# Patient Record
Sex: FEMALE | Race: WHITE | Employment: OTHER | URBAN - METROPOLITAN AREA
[De-identification: names, ages, dates, MRNs, and addresses within clinical notes are randomized per-mention and may not be internally consistent; named-entity substitution may affect disease eponyms.]

---

## 2017-05-10 ENCOUNTER — HOSPITAL ENCOUNTER (OUTPATIENT)
Dept: WOMENS IMAGING | Age: 67
Discharge: HOME | End: 2017-05-10
Attending: FAMILY MEDICINE | Admitting: FAMILY MEDICINE

## 2017-05-10 DIAGNOSIS — Z12.31 SCREENING MAMMOGRAM, ENCOUNTER FOR: ICD-10-CM

## 2017-05-16 ENCOUNTER — TELEPHONE (OUTPATIENT)
Dept: PHYSICAL MEDICINE AND REHAB | Age: 67
End: 2017-05-16

## 2017-05-17 ENCOUNTER — OFFICE VISIT (OUTPATIENT)
Dept: PHYSICAL MEDICINE AND REHAB | Age: 67
End: 2017-05-17

## 2017-05-17 DIAGNOSIS — R20.2 PARESTHESIA AND PAIN OF BOTH UPPER EXTREMITIES: ICD-10-CM

## 2017-05-17 DIAGNOSIS — M79.601 PARESTHESIA AND PAIN OF BOTH UPPER EXTREMITIES: ICD-10-CM

## 2017-05-17 DIAGNOSIS — M79.602 PARESTHESIA AND PAIN OF BOTH UPPER EXTREMITIES: ICD-10-CM

## 2017-05-17 DIAGNOSIS — R29.898 LEG WEAKNESS, BILATERAL: ICD-10-CM

## 2017-05-17 DIAGNOSIS — M54.17 LUMBOSACRAL RADICULOPATHY AT S1: Primary | ICD-10-CM

## 2017-05-17 PROCEDURE — 95886 MUSC TEST DONE W/N TEST COMP: CPT | Performed by: PHYSICAL MEDICINE & REHABILITATION

## 2017-05-17 PROCEDURE — 95910 NRV CNDJ TEST 7-8 STUDIES: CPT | Performed by: PHYSICAL MEDICINE & REHABILITATION

## 2017-12-07 ENCOUNTER — HOSPITAL ENCOUNTER (OUTPATIENT)
Dept: GENERAL RADIOLOGY | Age: 67
Discharge: HOME | End: 2017-12-07
Attending: FAMILY MEDICINE | Admitting: FAMILY MEDICINE

## 2017-12-07 DIAGNOSIS — M79.604 RIGHT LEG PAIN: ICD-10-CM

## 2017-12-07 DIAGNOSIS — M79.605 LEFT LEG PAIN: ICD-10-CM

## 2017-12-20 ENCOUNTER — HOSPITAL ENCOUNTER (OUTPATIENT)
Dept: ULTRASOUND IMAGING | Age: 67
Discharge: HOME | End: 2017-12-20
Attending: FAMILY MEDICINE | Admitting: FAMILY MEDICINE

## 2017-12-20 DIAGNOSIS — M79.604 RIGHT LEG PAIN: ICD-10-CM

## 2017-12-20 DIAGNOSIS — M79.605 LEFT LEG PAIN: ICD-10-CM

## 2017-12-20 DIAGNOSIS — M79.604 PAIN OF RIGHT LEG: ICD-10-CM

## 2018-03-28 ENCOUNTER — HOSPITAL ENCOUNTER (OUTPATIENT)
Dept: ULTRASOUND IMAGING | Age: 68
Discharge: HOME | End: 2018-03-28
Attending: FAMILY MEDICINE | Admitting: FAMILY MEDICINE

## 2018-03-28 DIAGNOSIS — M79.661 RIGHT CALF PAIN: ICD-10-CM

## 2018-03-28 DIAGNOSIS — M79.669 PAIN OF LOWER LEG: ICD-10-CM

## 2018-05-11 ENCOUNTER — HOSPITAL ENCOUNTER (OUTPATIENT)
Dept: WOMENS IMAGING | Age: 68
Discharge: HOME | End: 2018-05-11
Attending: FAMILY MEDICINE | Admitting: FAMILY MEDICINE

## 2018-05-11 DIAGNOSIS — Z12.31 SCREENING MAMMOGRAM, ENCOUNTER FOR: ICD-10-CM

## 2018-08-31 ENCOUNTER — HOSPITAL ENCOUNTER (OUTPATIENT)
Dept: GENERAL RADIOLOGY | Age: 68
Discharge: HOME | End: 2018-08-31
Attending: FAMILY MEDICINE | Admitting: FAMILY MEDICINE

## 2018-08-31 DIAGNOSIS — M25.562 LEFT KNEE PAIN, UNSPECIFIED CHRONICITY: ICD-10-CM

## 2018-12-06 ENCOUNTER — TELEPHONE (OUTPATIENT)
Dept: ORTHOPEDIC SURGERY | Age: 68
End: 2018-12-06

## 2018-12-13 ENCOUNTER — OFFICE VISIT (OUTPATIENT)
Dept: ORTHOPEDIC SURGERY | Age: 68
End: 2018-12-13

## 2018-12-13 VITALS
OXYGEN SATURATION: 98 % | HEART RATE: 99 BPM | RESPIRATION RATE: 14 BRPM | HEIGHT: 65 IN | BODY MASS INDEX: 26.99 KG/M2 | WEIGHT: 162 LBS

## 2018-12-13 DIAGNOSIS — R52 PAIN: ICD-10-CM

## 2018-12-13 DIAGNOSIS — M17.12 PRIMARY OSTEOARTHRITIS OF LEFT KNEE: Primary | ICD-10-CM

## 2018-12-13 PROCEDURE — 20610 DRAIN/INJ JOINT/BURSA W/O US: CPT | Performed by: PHYSICIAN ASSISTANT

## 2018-12-13 PROCEDURE — 99203 OFFICE O/P NEW LOW 30 MIN: CPT | Performed by: PHYSICIAN ASSISTANT

## 2018-12-13 RX ORDER — NAPROXEN 500 MG/1
500 TABLET ORAL 2 TIMES DAILY PRN
Qty: 60 TABLET | Refills: 1 | Status: SHIPPED | OUTPATIENT
Start: 2018-12-13 | End: 2018-12-17 | Stop reason: SDUPTHER

## 2018-12-13 NOTE — PROGRESS NOTES
Review of Systems   Constitutional: Negative for fever. Musculoskeletal: Positive for arthralgias and joint swelling. Skin: Negative for rash and wound. HPI:  Ale Ruiz is a 77y.o. year old female who complains of 7/10 aching, persistent Left knee pain and stiffness. She states that she knows she has arthritis in the left knee and it is bothering her more. She has used Tylenol, ibuprofen with mild relief. She states that weightbearing going up and down stairs makes the pain worse. Rest does improve her pain mildly. She's had no prior surgery on the knee before. She has been experiencing this pain for the last several weeks. Past Medical History:   Diagnosis Date    Hyperlipidemia     Hypertension     Thyroid disease        Past Surgical History:   Procedure Laterality Date    CHOLECYSTECTOMY         History reviewed. No pertinent family history. Social History     Social History    Marital status:      Spouse name: N/A    Number of children: N/A    Years of education: N/A     Social History Main Topics    Smoking status: Never Smoker    Smokeless tobacco: None    Alcohol use Yes      Comment: rarely    Drug use: No    Sexual activity: Not Asked     Other Topics Concern    None     Social History Narrative    None       Current Outpatient Prescriptions   Medication Sig Dispense Refill    naproxen (NAPROSYN) 500 MG tablet Take 1 tablet by mouth 2 times daily as needed for Pain 60 tablet 1    sertraline (ZOLOFT) 100 MG tablet Take 100 mg by mouth daily. Pt uncertain of strength,  States she has not been taking it consistently      levothyroxine (SYNTHROID) 88 MCG tablet Take 88 mcg by mouth Daily.  Valsartan (DIOVAN PO) Take  by mouth daily.  Atorvastatin Calcium (LIPITOR PO) Take  by mouth daily. No current facility-administered medications for this visit.         No Known Allergies    Review of Systems:  See above      Physical Exam:

## 2018-12-17 RX ORDER — NAPROXEN 500 MG/1
500 TABLET ORAL 2 TIMES DAILY PRN
Qty: 60 TABLET | Refills: 1 | Status: SHIPPED | OUTPATIENT
Start: 2018-12-17 | End: 2019-06-17

## 2019-05-13 ENCOUNTER — HOSPITAL ENCOUNTER (OUTPATIENT)
Dept: WOMENS IMAGING | Age: 69
Discharge: HOME | End: 2019-05-13
Payer: MEDICARE

## 2019-05-13 DIAGNOSIS — M81.0 OSTEOPOROSIS, UNSPECIFIED OSTEOPOROSIS TYPE, UNSPECIFIED PATHOLOGICAL FRACTURE PRESENCE: ICD-10-CM

## 2019-05-13 DIAGNOSIS — Z12.31 SCREENING MAMMOGRAM, ENCOUNTER FOR: ICD-10-CM

## 2019-05-13 PROCEDURE — 77080 DXA BONE DENSITY AXIAL: CPT

## 2019-05-13 PROCEDURE — 77063 BREAST TOMOSYNTHESIS BI: CPT

## 2019-06-17 ENCOUNTER — OFFICE VISIT (OUTPATIENT)
Dept: ORTHOPEDIC SURGERY | Age: 69
End: 2019-06-17
Payer: MEDICARE

## 2019-06-17 VITALS
HEART RATE: 87 BPM | OXYGEN SATURATION: 94 % | WEIGHT: 165 LBS | BODY MASS INDEX: 27.46 KG/M2 | RESPIRATION RATE: 14 BRPM

## 2019-06-17 DIAGNOSIS — M17.12 PRIMARY OSTEOARTHRITIS OF LEFT KNEE: Primary | ICD-10-CM

## 2019-06-17 PROCEDURE — 99212 OFFICE O/P EST SF 10 MIN: CPT | Performed by: PHYSICIAN ASSISTANT

## 2019-06-17 PROCEDURE — 20610 DRAIN/INJ JOINT/BURSA W/O US: CPT | Performed by: PHYSICIAN ASSISTANT

## 2019-06-17 RX ORDER — AMLODIPINE BESYLATE 5 MG/1
TABLET ORAL
Refills: 3 | COMMUNITY
Start: 2019-06-04 | End: 2020-01-06

## 2019-06-17 RX ORDER — GABAPENTIN 100 MG/1
CAPSULE ORAL
Refills: 5 | COMMUNITY
Start: 2019-06-06 | End: 2020-09-14

## 2019-06-17 ASSESSMENT — ENCOUNTER SYMPTOMS
EYES NEGATIVE: 1
RESPIRATORY NEGATIVE: 1
GASTROINTESTINAL NEGATIVE: 1

## 2019-06-17 NOTE — PROGRESS NOTES
I reviewed and agree with the portions of the HPI, review of systems, vital documentation and plan performed by my staff and have added/addended where appropriate. Review of Systems   Constitutional: Negative. Negative for fever. HENT: Negative. Eyes: Negative. Respiratory: Negative. Cardiovascular: Negative. Gastrointestinal: Negative. Genitourinary: Negative. Musculoskeletal: Positive for arthralgias and joint swelling. Skin: Negative. Negative for rash and wound. Neurological: Negative. Negative for numbness. Psychiatric/Behavioral: Negative. HPI:  Whitley Garay is a 68y.o. year old female who complains of 2/10 stabbing, persistent left knee pain stiffness, and locking. She states that she knows she has arthritis in the left knee and it is bothering her more. She has used Tylenol, ibuprofen, and received steroid injections with mild relief that does not last very long. She states that weightbearing, increased activity, and going up and down stairs makes the pain worse. Rest and the use of medicated cream does improve her pain mildly. She's had no prior surgery on the knee before. Onset: 2 years. Pain is located along the medial aspect of the knee, has swellings, and patient reports tenderness upon palpation. Past Medical History:   Diagnosis Date    Hyperlipidemia     Hypertension     Thyroid disease        Past Surgical History:   Procedure Laterality Date    CHOLECYSTECTOMY         No family history on file.     Social History     Socioeconomic History    Marital status: Single     Spouse name: Not on file    Number of children: Not on file    Years of education: Not on file    Highest education level: Not on file   Occupational History    Not on file   Social Needs    Financial resource strain: Not on file    Food insecurity:     Worry: Not on file     Inability: Not on file    Transportation needs:     Medical: Not on file     Non-medical: Not on file   Tobacco Use    Smoking status: Never Smoker    Smokeless tobacco: Never Used   Substance and Sexual Activity    Alcohol use: Yes     Comment: rarely    Drug use: No    Sexual activity: Not on file   Lifestyle    Physical activity:     Days per week: Not on file     Minutes per session: Not on file    Stress: Not on file   Relationships    Social connections:     Talks on phone: Not on file     Gets together: Not on file     Attends Hindu service: Not on file     Active member of club or organization: Not on file     Attends meetings of clubs or organizations: Not on file     Relationship status: Not on file    Intimate partner violence:     Fear of current or ex partner: Not on file     Emotionally abused: Not on file     Physically abused: Not on file     Forced sexual activity: Not on file   Other Topics Concern    Not on file   Social History Narrative    Not on file       Current Outpatient Medications   Medication Sig Dispense Refill    gabapentin (NEURONTIN) 100 MG capsule TAKE 1 CAPSULE BY MOUTH ONCE DAILY  5    amLODIPine (NORVASC) 5 MG tablet TAKE 1 TABLET BY MOUTH ONCE DAILY FOR 30 DAYS  3    levothyroxine (SYNTHROID) 88 MCG tablet Take 88 mcg by mouth Daily.  Atorvastatin Calcium (LIPITOR PO) Take  by mouth daily. No current facility-administered medications for this visit. No Known Allergies    Review of Systems:  See above      Physical Exam:   Pulse 87   Resp 14   Wt 165 lb (74.8 kg)   SpO2 94%   BMI 27.46 kg/m²        Gait is Antalgic. The patient can bear weight on the injured extremity. Gen/Psych: Examinationreveals a pleasant individual in no acute distress. The patient is oriented to time, place and person. The patient's mood and affect are appropriate.  Body habitus is normal, she appears well-nourished.     Lymph:  no lymphedema in the left or right lower extremity.     Skin intact without lymphadenopathy, discoloration, or abnormal

## 2020-01-06 ENCOUNTER — OFFICE VISIT (OUTPATIENT)
Dept: ORTHOPEDIC SURGERY | Age: 70
End: 2020-01-06
Payer: MEDICARE

## 2020-01-06 VITALS
WEIGHT: 157.4 LBS | BODY MASS INDEX: 26.23 KG/M2 | RESPIRATION RATE: 16 BRPM | OXYGEN SATURATION: 98 % | HEART RATE: 89 BPM | HEIGHT: 65 IN

## 2020-01-06 PROCEDURE — 20610 DRAIN/INJ JOINT/BURSA W/O US: CPT | Performed by: PHYSICIAN ASSISTANT

## 2020-01-06 PROCEDURE — 99212 OFFICE O/P EST SF 10 MIN: CPT | Performed by: PHYSICIAN ASSISTANT

## 2020-01-06 RX ORDER — NAPROXEN 500 MG/1
500 TABLET ORAL PRN
COMMUNITY

## 2020-01-06 ASSESSMENT — ENCOUNTER SYMPTOMS
GASTROINTESTINAL NEGATIVE: 1
EYES NEGATIVE: 1
RESPIRATORY NEGATIVE: 1

## 2020-01-06 NOTE — PROGRESS NOTES
I reviewed and agree with the portions of the HPI, review of systems, vital documentation and plan performed by my staff and have added/addended where appropriate. Review of Systems   Constitutional: Negative. Negative for fever. HENT: Negative. Eyes: Negative. Respiratory: Negative. Cardiovascular: Negative. Gastrointestinal: Negative. Genitourinary: Negative. Musculoskeletal: Positive for arthralgias and joint swelling. Skin: Negative. Negative for rash and wound. Neurological: Negative. Negative for numbness. Psychiatric/Behavioral: Negative. HPI:  Kulwinder Renner is a 68y.o. year old female who presents in office for continued left knee pain requesting steroid injection today. Last steroid injection was administered on 6/17/19 and offered patient relief until recently. Pain is described a intermittent, aching sensation that immobilizes patient at times. Pain is rated at 7/10 and she is currently taking Naproxen and Gabapentin for pain which provides short relief of symptoms. Past Medical History:   Diagnosis Date    Hyperlipidemia     Hypertension     Thyroid disease        Past Surgical History:   Procedure Laterality Date    CHOLECYSTECTOMY         No family history on file.     Social History     Socioeconomic History    Marital status: Single     Spouse name: None    Number of children: None    Years of education: None    Highest education level: None   Occupational History    None   Social Needs    Financial resource strain: None    Food insecurity:     Worry: None     Inability: None    Transportation needs:     Medical: None     Non-medical: None   Tobacco Use    Smoking status: Never Smoker    Smokeless tobacco: Never Used   Substance and Sexual Activity    Alcohol use: Yes     Comment: rarely    Drug use: No    Sexual activity: None   Lifestyle    Physical activity:     Days per week: None     Minutes per session: None    Stress: None at:   Medial joint line and mild    Knee strength is 5/5 flexion and extension in the left and right knee  There is + L2-S1 motor and sensory function in the left lower extremity    No new x-rays taken today  Prior x-rays taken in December showed moderate degenerative changes within the left knee. Impression:  left knee DJD- moderate      Plan:    Injection given left knee today   Rest knee today  Follow-up as needed    Left knee injection procedure note    Pre-procedure diagnosis: Left knee DJD    Post-procedure diagnosis:  same    Procedure: The planned procedure/risks/benefits/alternatives were discussed with the patient. Risks include, but are not limited to, increased pain, drug reaction, infection, bleeding, lack of improvement, neurovascular injury, and increased blood sugar levels. The patient understood and all of their questions were answered. The Left knee was prepped with alcohol then a 22 gauge needle was advanced into the inferior-lateral joint without difficulty. The joint was then injected with 3 ml 1% lidocaine and 2 ml of kenalog (40mg/ml). The injection site was cleansed with isopropyl alcohol and a band-aid was placed. Complications:  None, the patient   the procedure well. Instructions: The patient was advised to rest the knee and decrease activity for the next 24 to 48 hours. May use prescription or OTC pain relievers as needed for any post-injection pain as well as ice.

## 2020-03-04 ENCOUNTER — HOSPITAL ENCOUNTER (OUTPATIENT)
Dept: ULTRASOUND IMAGING | Age: 70
Discharge: HOME | End: 2020-03-04
Payer: MEDICARE

## 2020-03-04 PROCEDURE — 93922 UPR/L XTREMITY ART 2 LEVELS: CPT

## 2020-03-11 ENCOUNTER — TELEPHONE (OUTPATIENT)
Dept: ORTHOPEDIC SURGERY | Age: 70
End: 2020-03-11

## 2020-03-11 RX ORDER — NAPROXEN 500 MG/1
500 TABLET ORAL 2 TIMES DAILY WITH MEALS
Qty: 60 TABLET | Refills: 0 | Status: SHIPPED | OUTPATIENT
Start: 2020-03-11 | End: 2020-09-14 | Stop reason: SDUPTHER

## 2020-06-04 ENCOUNTER — OFFICE VISIT (OUTPATIENT)
Dept: ORTHOPEDIC SURGERY | Age: 70
End: 2020-06-04
Payer: MEDICARE

## 2020-06-04 VITALS
RESPIRATION RATE: 16 BRPM | BODY MASS INDEX: 25.99 KG/M2 | HEART RATE: 91 BPM | OXYGEN SATURATION: 98 % | HEIGHT: 65 IN | WEIGHT: 156 LBS

## 2020-06-04 PROCEDURE — 20610 DRAIN/INJ JOINT/BURSA W/O US: CPT | Performed by: PHYSICIAN ASSISTANT

## 2020-06-04 PROCEDURE — 99212 OFFICE O/P EST SF 10 MIN: CPT | Performed by: PHYSICIAN ASSISTANT

## 2020-06-04 NOTE — PROGRESS NOTES
The official read and interpretation of these x-rays will be done by the the [facility]       Review of Systems   Musculoskeletal: Positive for arthralgias. Skin: Negative for rash and wound. HPI:  Letty Espinosa is a 26-year-old female patient that presents in office with complaints of chronic left knee pain that returned about 2-3 weeks ago. Patient continues to have intermittent, aching medial sided pain rated at a 8/10 with stiffness. Last injection was administered on January 6, 2020 which offered her relief until recently. Continues to take Neurontin for pain. Patient requests another steroid injection today. No new injury or worsening of symptoms. Past Medical History:   Diagnosis Date    Hyperlipidemia     Hypertension     Thyroid disease        Past Surgical History:   Procedure Laterality Date    CHOLECYSTECTOMY         No family history on file.     Social History     Socioeconomic History    Marital status: Single     Spouse name: None    Number of children: None    Years of education: None    Highest education level: None   Occupational History    None   Social Needs    Financial resource strain: None    Food insecurity     Worry: None     Inability: None    Transportation needs     Medical: None     Non-medical: None   Tobacco Use    Smoking status: Never Smoker    Smokeless tobacco: Never Used   Substance and Sexual Activity    Alcohol use: Yes     Comment: rarely    Drug use: No    Sexual activity: None   Lifestyle    Physical activity     Days per week: None     Minutes per session: None    Stress: None   Relationships    Social connections     Talks on phone: None     Gets together: None     Attends Anglican service: None     Active member of club or organization: None     Attends meetings of clubs or organizations: None     Relationship status: None    Intimate partner violence     Fear of current or ex partner: None     Emotionally abused:

## 2020-09-14 ENCOUNTER — OFFICE VISIT (OUTPATIENT)
Dept: ORTHOPEDIC SURGERY | Age: 70
End: 2020-09-14
Payer: MEDICARE

## 2020-09-14 VITALS
HEIGHT: 65 IN | OXYGEN SATURATION: 98 % | BODY MASS INDEX: 38.49 KG/M2 | RESPIRATION RATE: 16 BRPM | WEIGHT: 231 LBS | HEART RATE: 77 BPM

## 2020-09-14 PROCEDURE — 20610 DRAIN/INJ JOINT/BURSA W/O US: CPT | Performed by: PHYSICIAN ASSISTANT

## 2020-09-14 PROCEDURE — 99212 OFFICE O/P EST SF 10 MIN: CPT | Performed by: PHYSICIAN ASSISTANT

## 2020-09-14 RX ORDER — NAPROXEN 500 MG/1
500 TABLET ORAL 2 TIMES DAILY WITH MEALS
Qty: 60 TABLET | Refills: 0 | Status: SHIPPED | OUTPATIENT
Start: 2020-09-14 | End: 2020-10-12

## 2020-09-14 ASSESSMENT — ENCOUNTER SYMPTOMS
EYES NEGATIVE: 1
RESPIRATORY NEGATIVE: 1
GASTROINTESTINAL NEGATIVE: 1

## 2020-09-14 NOTE — PROGRESS NOTES
I reviewed and agree with the portions of the HPI, review of systems, vital documentation and plan performed by my staff and have added/addended where appropriate. Review of Systems   Constitutional: Negative. HENT: Negative. Eyes: Negative. Respiratory: Negative. Cardiovascular: Negative. Gastrointestinal: Negative. Genitourinary: Negative. Musculoskeletal: Positive for arthralgias, gait problem and joint swelling. Skin: Negative. Negative for rash and wound. Psychiatric/Behavioral: Negative. HPI:  Daylin Ledezma is a 65 y.o. female that presents in office with complaints of chronic left knee pain requesting steroid injection be administered today. Patient's last steroid injection was administered on June 4, 2020 which offered her relief for until recently when pain returned now radiating behind the kneecap. Pain is rated at a 8/10 in office today with pain remaining along the medial aspect of the left knee with stiffness. She has not done any viscosupplementation injections in the knee. She does continue to get decent relief from the steroid injection and would like to continue doing that until it loses its efficacy. Past Medical History:   Diagnosis Date    Hyperlipidemia     Hypertension     Thyroid disease        Past Surgical History:   Procedure Laterality Date    CHOLECYSTECTOMY         No family history on file.     Social History     Socioeconomic History    Marital status: Single     Spouse name: None    Number of children: None    Years of education: None    Highest education level: None   Occupational History    None   Social Needs    Financial resource strain: None    Food insecurity     Worry: None     Inability: None    Transportation needs     Medical: None     Non-medical: None   Tobacco Use    Smoking status: Never Smoker    Smokeless tobacco: Never Used   Substance and Sexual Activity    Alcohol use: Yes     Comment: rarely    Drug use: No    Sexual activity: None   Lifestyle    Physical activity     Days per week: None     Minutes per session: None    Stress: None   Relationships    Social connections     Talks on phone: None     Gets together: None     Attends Adventist service: None     Active member of club or organization: None     Attends meetings of clubs or organizations: None     Relationship status: None    Intimate partner violence     Fear of current or ex partner: None     Emotionally abused: None     Physically abused: None     Forced sexual activity: None   Other Topics Concern    None   Social History Narrative    None       Current Outpatient Medications   Medication Sig Dispense Refill    naproxen (NAPROSYN) 500 MG tablet Take 1 tablet by mouth 2 times daily (with meals) 60 tablet 0    naproxen (NAPROSYN) 500 MG tablet Take 500 mg by mouth as needed for Pain      levothyroxine (SYNTHROID) 88 MCG tablet Take 88 mcg by mouth Daily. No current facility-administered medications for this visit. No Known Allergies    Review of Systems:  See above      Physical Exam:   Pulse 77   Resp 16   Ht 5' 5\" (1.651 m)   Wt 231 lb (104.8 kg)   SpO2 98%   BMI 38.44 kg/m²        Gait is Antalgic. The patient can bear weight on the injured extremity. Gen/Psych:Examination reveals a pleasant individual in no acute distress. The patient is oriented to time, place and person. The patient's mood and affect are appropriate. Patient appears well nourished. Body habitus is obese     Lymph:  no lymphedema in bilateral lower extremities     Skin intact in bilateral lower extremities with no ulcerations, lesions, rash, erythema. Vascular: There are no varicosities in bilateral lower extremities, sensation intact to light touch over bilateral lower extremities.       left leg/knee exam:  Leg alignment:     neutral  Quadriceps/hamstring atrophy:   no  Knee effusion:    no   Knee erythema:   no  ROM:     2-100°  Varus laxity at 0 and 30 deg's: no  Valguslaxity at 0 and 30 deg's: no  Recurvatum:    no  Tenderness at:   Medial and lateral joint line tenderness    Bilateral Knee strength is 5/5 flexion and extension  There is + L2-S1 motor and sensory function in bilateral lower extremities    Outside record review: office notes     Imaging studies:  No new x-rays taken today, prior x-rays taken a year ago showed moderate degenerative change within the left knee      Impression:  left knee DJD      Plan:  Natural history and expected course discussed. Questions answered. Patient Instructions   Steroid injection   Rest the left knee for 24-48 hours  Work on range of motion as tolerated  May continue to take OTC pain medications or anti-inflammatories as needed  Rest, ice, and elevate as needed  Weightbearing as tolerated  Follow-up as needed but at next visit will need new weightbearing x-rays of the left knee. Left knee injection procedure note    Pre-procedure diagnosis:  Left knee DJD    Post-procedure diagnosis:  same    Procedure: The planned procedure/risks/benefits/alternatives were discussed with the patient. Risks include, but are not limited to, increased pain, drug reaction, infection, bleeding, lack of improvement, neurovascular injury, and increased blood sugar levels. The patient understood and all of their questions were answered. The Left knee was prepped with alcohol then a 22 gauge needle was advanced into the inferior-lateral joint without difficulty. The joint was then injected with 3 ml 1% lidocaine, 2ml of Kenalog (40 mg/ml). The injection site was cleansed with isopropyl alcohol and a band-aid was placed. Complications:  None, the patient tolerated the procedure well. Instructions: The patient was advised to rest the knee and decrease activity for the next 24 to 48 hours. May use prescription or OTC pain relievers as needed for any post-injection pain as well as ice.

## 2020-09-28 ENCOUNTER — TELEPHONE (OUTPATIENT)
Dept: ORTHOPEDIC SURGERY | Age: 70
End: 2020-09-28

## 2020-09-28 NOTE — TELEPHONE ENCOUNTER
Patient called stating that her weight is incorrect and is supposed to be 153 lbs. She would like to proceed with gel injections. Please proceed with getting a prior auth with her insurance.

## 2020-10-01 ENCOUNTER — HOSPITAL ENCOUNTER (OUTPATIENT)
Dept: WOMENS IMAGING | Age: 70
Discharge: HOME | End: 2020-10-01
Payer: MEDICARE

## 2020-10-01 PROCEDURE — 77063 BREAST TOMOSYNTHESIS BI: CPT

## 2020-10-02 NOTE — TELEPHONE ENCOUNTER
Approved PA [id number].  10/12/2020 to 12/11/2020  Patient was approved for Gelsyn 3 injection  Would still get an ABN signed

## 2020-10-06 NOTE — TELEPHONE ENCOUNTER
Please call patient to get scheduled for her left knee gelsyn 3 injection 3 appointments 1 week apart from eachother

## 2020-10-12 ENCOUNTER — OFFICE VISIT (OUTPATIENT)
Dept: ORTHOPEDIC SURGERY | Age: 70
End: 2020-10-12
Payer: MEDICARE

## 2020-10-12 VITALS
HEART RATE: 84 BPM | HEIGHT: 65 IN | OXYGEN SATURATION: 97 % | WEIGHT: 154.2 LBS | BODY MASS INDEX: 25.69 KG/M2 | RESPIRATION RATE: 16 BRPM

## 2020-10-12 PROCEDURE — 20610 DRAIN/INJ JOINT/BURSA W/O US: CPT | Performed by: PHYSICIAN ASSISTANT

## 2020-10-12 RX ORDER — AMLODIPINE BESYLATE 5 MG/1
TABLET ORAL
COMMUNITY
Start: 2020-08-10

## 2020-10-12 NOTE — PATIENT INSTRUCTIONS
Germán 3 # 1  Rest left knee for 24-48 hours  Work on ROM and strengthening of knees and legs  May take Naproxen or Tylenol as needed for pain  Rest, ice , and elevate as needed  Follow up next week for [facility] # 2

## 2020-10-12 NOTE — PROGRESS NOTES
VISCO-SUPPLEMENTATION INJECTION (Number 1)  I reviewed and agree with the portions of the HPI, review of systems, vital documentation and plan performed by my staff and have added/addended where appropriate. HISTORY OF PRESENT ILLNESS (HPI):   Holly Pressley is a 65y.o. year old female who is here for follow up for visco-supplementation injection number 1 for osteoarthritis of the left knee. PAST HISTORY:   Unless otherwise specified in this note, the past history is reviewed today with the patient and is as follows-Pain is rated between a 6-7/10 with pain radiating toward the posterior aspect of the bilateral leg making long periods of standing difficult. No Known Allergies    Current Outpatient Medications   Medication Sig Dispense Refill    naproxen (NAPROSYN) 500 MG tablet Take 1 tablet by mouth 2 times daily (with meals) 60 tablet 0    naproxen (NAPROSYN) 500 MG tablet Take 500 mg by mouth as needed for Pain      levothyroxine (SYNTHROID) 88 MCG tablet Take 88 mcg by mouth Daily. No current facility-administered medications for this visit. PHYSICAL EXAM:   Ht 5' 5\" (1.651 m)   BMI 38.44 kg/m²     The left knee is examined:  Inspection:  Skin is intact. No erythema. Palpation:  No swelling or acute tenderness. Neuro/Vascular/Motor:  Sensation to light touch intact. Capillary refill brisk. No focal motor deficits. DIAGNOSIS:   Osteoarthritis of the left knee    PROCEDURE:   Left Knee Aspiration / Injection Procedure:  Multiple treatment options were discussed. Joint injection was recommended. Details of the procedure, potential risks, and potential benefits were discussed. Patient's questions were answered. Patient elected to proceed with procedure. Medication: Gelsyn [address] #:[id number]  Lot #:[id number]  Procedure:  Sterile technique was used as the skin over the injection site was prepped with alcohol.     The left knee joint was then injected with the above listed medication. A sterile bandage was placed over the injection site. The patient tolerated the procedure well without complication. The patient is scheduled for the second injection in one week.

## 2020-10-19 ENCOUNTER — OFFICE VISIT (OUTPATIENT)
Dept: ORTHOPEDIC SURGERY | Age: 70
End: 2020-10-19
Payer: MEDICARE

## 2020-10-19 VITALS
RESPIRATION RATE: 16 BRPM | HEIGHT: 65 IN | BODY MASS INDEX: 25.33 KG/M2 | OXYGEN SATURATION: 98 % | HEART RATE: 72 BPM | WEIGHT: 152 LBS

## 2020-10-19 PROCEDURE — 20610 DRAIN/INJ JOINT/BURSA W/O US: CPT | Performed by: PHYSICIAN ASSISTANT

## 2020-10-19 NOTE — PROGRESS NOTES
VISCO-SUPPLEMENTATION INJECTION (Number 2)  I reviewed and agree with the portions of the HPI, review of systems, vital documentation and plan performed by my staff and have added/addended where appropriate. HISTORY OF PRESENT ILLNESS (HPI):   Brittanie Marquez is a 65y.o. year old female who is here for follow up for visco-supplementation injection number 2 for osteoarthritis of the left knee. PAST HISTORY:   Unless otherwise specified in this note, the past history is reviewed today with the patient and is as follows-Patient continues to have instability of the left knee with pain along the posterior aspect of the left knee. Pain is rated at a 7/10. Patient has a brace but has not been wearing brace at this time. No Known Allergies    Current Outpatient Medications   Medication Sig Dispense Refill    amLODIPine (NORVASC) 5 MG tablet TAKE 1 TABLET BY MOUTH ONCE DAILY      naproxen (NAPROSYN) 500 MG tablet Take 500 mg by mouth as needed for Pain      levothyroxine (SYNTHROID) 88 MCG tablet Take 88 mcg by mouth Daily. No current facility-administered medications for this visit. PHYSICAL EXAM:   Pulse 72   Resp 16   Ht 5' 5\" (1.651 m)   Wt 156 lb (70.8 kg)   SpO2 98%   BMI 25.96 kg/m²     The left knee is examined:  Inspection:  Skin is intact. No erythema. Palpation:  No swelling or acute tenderness. Neuro/Vascular/Motor:  Sensation to light touch intact. Capillary refill brisk. No focal motor deficits. DIAGNOSIS:   Osteoarthritis of the left knee    PROCEDURE:   Left Knee Aspiration / Injection Procedure:  Multiple treatment options were discussed. Joint injection was recommended. Details of the procedure, potential risks, and potential benefits were discussed. Patient's questions were answered. Patient elected to proceed with procedure.   Medication: [place] #:[id number]  Lot #:[id number]  Procedure:  Sterile technique was used as the skin over the injection site was prepped with alcohol then anesthetized with ethyl chloride spray per the patient's request then the knee joint was then injected with the above listed medication. A sterile bandage was placed over the injection site. The patient tolerated the procedure well without complication. The patient is scheduled for the third injection in one week.

## 2020-10-19 NOTE — PATIENT INSTRUCTIONS
Gelsyn 3 # 2  Rest the left knee for 24-48  Hours  Work on ROM and strengthening of the left knee and leg  Rest, ice, and elevate  May take Ibuprofen or Naproxen as needed  Weightbearing and activities as tolerated  Follow up for Gelsyn 3

## 2020-10-26 ENCOUNTER — OFFICE VISIT (OUTPATIENT)
Dept: ORTHOPEDIC SURGERY | Age: 70
End: 2020-10-26
Payer: MEDICARE

## 2020-10-26 VITALS
HEIGHT: 65 IN | OXYGEN SATURATION: 98 % | WEIGHT: 152 LBS | HEART RATE: 64 BPM | RESPIRATION RATE: 16 BRPM | BODY MASS INDEX: 25.33 KG/M2

## 2020-10-26 PROCEDURE — 20610 DRAIN/INJ JOINT/BURSA W/O US: CPT | Performed by: PHYSICIAN ASSISTANT

## 2020-10-26 NOTE — PATIENT INSTRUCTIONS
Gelsyn # 3  Rest both knees for 24-48 hours  Work on ROM and strengthening of knees and legs   May take NSAIDS or anti-inflammatories as needed  Weightbearing as tolerated  Follow up as needed

## 2020-10-26 NOTE — PROGRESS NOTES
VISCO-SUPPLEMENTATION INJECTION (Number 3)  I reviewed and agree with the portions of the HPI, review of systems, vital documentation and plan performed by my staff and have added/addended where appropriate.     HISTORY OF PRESENT ILLNESS (HPI):   Kate Beaulieu is a 65y.o. year old female who is here for follow up for visco-supplementation injection number 3 for osteoarthritis of the left knee.     PAST HISTORY:   Unless otherwise specified in this note, the past history is reviewed today with the patient and is as follows-Patient continues to have instability of the left knee with pain along the posterior aspect of the left knee. Pain is rated at a 7/10. Patient has a brace but has not been wearing brace at this time.      No Known Allergies     Current Facility-Administered Medications          Current Outpatient Medications   Medication Sig Dispense Refill    amLODIPine (NORVASC) 5 MG tablet TAKE 1 TABLET BY MOUTH ONCE DAILY        naproxen (NAPROSYN) 500 MG tablet Take 500 mg by mouth as needed for Pain        levothyroxine (SYNTHROID) 88 MCG tablet Take 88 mcg by mouth Daily.          No current facility-administered medications for this visit.            PHYSICAL EXAM:   Pulse 72   Resp 16   Ht 5' 5\" (1.651 m)   Wt 156 lb (70.8 kg)   SpO2 98%   BMI 25.96 kg/m²      The left knee is examined:  Inspection:  Skin is intact. No erythema. Palpation:  No swelling or acute tenderness. Neuro/Vascular/Motor:  Sensation to light touch intact. Capillary refill brisk. No focal motor deficits.     DIAGNOSIS:   Osteoarthritis of the left knee     PROCEDURE:   Left Knee Aspiration / Injection Procedure:  Multiple treatment options were discussed. Joint injection was recommended. Details of the procedure, potential risks, and potential benefits were discussed. Patient's questions were answered. Patient elected to proceed with procedure.   Medication: Micah Barba #:[id number]  Lot #:[id number]  Procedure:

## 2020-11-09 ENCOUNTER — OFFICE VISIT (OUTPATIENT)
Dept: ORTHOPEDIC SURGERY | Age: 70
End: 2020-11-09
Payer: MEDICARE

## 2020-11-09 VITALS — WEIGHT: 152 LBS | RESPIRATION RATE: 14 BRPM | BODY MASS INDEX: 25.33 KG/M2 | HEIGHT: 65 IN

## 2020-11-09 PROCEDURE — 99212 OFFICE O/P EST SF 10 MIN: CPT | Performed by: PHYSICIAN ASSISTANT

## 2020-11-09 RX ORDER — PREDNISONE 20 MG/1
20 TABLET ORAL 2 TIMES DAILY
Qty: 14 TABLET | Refills: 0 | Status: SHIPPED | OUTPATIENT
Start: 2020-11-09 | End: 2020-11-16

## 2020-11-09 ASSESSMENT — ENCOUNTER SYMPTOMS
RESPIRATORY NEGATIVE: 1
EYES NEGATIVE: 1
GASTROINTESTINAL NEGATIVE: 1

## 2020-11-09 NOTE — PROGRESS NOTES
Review of Systems   Constitutional: Negative. HENT: Negative. Eyes: Negative. Respiratory: Negative. Cardiovascular: Negative. Gastrointestinal: Negative. Genitourinary: Negative. Musculoskeletal: Positive for arthralgias. Skin: Negative. Negative for rash and wound. Neurological: Negative. Psychiatric/Behavioral: Negative. HPI:  Randi Leon is a 65 y.o. female presenting to the office today after viscosupplementation injections were completed 2 weeks ago. She states that she does feel like the injections helped some but she still having left posterior leg pain radiating from the knee up the back of her thigh to her buttock. She is not sure whether this is coming from the knee or from her back. Her PCP stated that she may have some back issues such as spinal stenosis which could be contributing to her leg pain and suggested she see a back surgeon but that has not happened yet. She also is having right knee pain as well but the left leg is what hurts her most.  No recent injuries. Past Medical History:   Diagnosis Date    Hyperlipidemia     Hypertension     Thyroid disease        Past Surgical History:   Procedure Laterality Date    CHOLECYSTECTOMY         No family history on file.     Social History     Socioeconomic History    Marital status: Single     Spouse name: None    Number of children: None    Years of education: None    Highest education level: None   Occupational History    None   Social Needs    Financial resource strain: None    Food insecurity     Worry: None     Inability: None    Transportation needs     Medical: None     Non-medical: None   Tobacco Use    Smoking status: Never Smoker    Smokeless tobacco: Never Used   Substance and Sexual Activity    Alcohol use: Yes     Comment: rarely    Drug use: No    Sexual activity: None   Lifestyle    Physical activity     Days per week: None     Minutes per session: None    Stress: None   Relationships    Social connections     Talks on phone: None     Gets together: None     Attends Rastafarian service: None     Active member of club or organization: None     Attends meetings of clubs or organizations: None     Relationship status: None    Intimate partner violence     Fear of current or ex partner: None     Emotionally abused: None     Physically abused: None     Forced sexual activity: None   Other Topics Concern    None   Social History Narrative    None       Current Outpatient Medications   Medication Sig Dispense Refill    predniSONE (DELTASONE) 20 MG tablet Take 1 tablet by mouth 2 times daily for 7 days 14 tablet 0    amLODIPine (NORVASC) 5 MG tablet TAKE 1 TABLET BY MOUTH ONCE DAILY      naproxen (NAPROSYN) 500 MG tablet Take 500 mg by mouth as needed for Pain      levothyroxine (SYNTHROID) 88 MCG tablet Take 88 mcg by mouth Daily. No current facility-administered medications for this visit. No Known Allergies    Review of Systems:  See above      Physical Exam:   Resp 14   Ht 5' 5\" (1.651 m)   Wt 152 lb (68.9 kg)   BMI 25.29 kg/m²        Gait is Normal. The patient can bear weight on the injured extremity. Gen/Psych:Examination reveals a pleasant individual in no acute distress. The patient is oriented to time, place and person. The patient's mood and affect are appropriate. Patient appears well nourished. Body habitus is normal     Lymph:  no lymphedema in bilateral lower extremities     Skin intact in bilateral lower extremities with no ulcerations, lesions, rash, erythema. Vascular: There are no varicosities in bilateral lower extremities, sensation intact to light touch over bilateral lower extremities.       bilateral leg/knee exam:  Leg alignment:     neutral  Quadriceps/hamstring atrophy:   no  Knee effusion:    no   Knee erythema:   no  ROM:      0-120 degrees  Varus laxity at 0 and 30 deg's: no  Valguslaxity at 0 and 30 deg's: no  Recurvatum:    no  Tenderness at:   Medial and lateral joint line    Bilateral Knee strength is 5/5 flexion and extension  There is + L2-S1 motor and sensory function in bilateral lower extremities    Outside record review: office notes and prior x-rays were reviewed      Imaging studies:  4 views of the right knee taken reviewed in the office today show severe tricompartmental osteoarthritis affecting primarily the medial compartment and the patellofemoral compartment to the greatest degree. There is bone-on-bone articulation in the medial compartment especially with the tunnel view. There are periarticular osteophytes off the medial tibial plateau. There are no acute fractures or dislocations in the right knee. 4 views of the left knee taken and reviewed in the office today show severe tricompartmental osteoarthritic changes within the medial compartment and the patellofemoral compartment as well as lateral translation of the tibia in relationship to the femur. No acute fractures, or dislocations of the left knee. The official read and interpretation of these x-rays will be done by the the [facility]         Impression:  bilateral knee DJD      Plan:  Natural history and expected course discussed. Questions answered. Patient Instructions   Follow up as needed for steroid injection  Take prednisone as prescribed  I explained to the patient that I did not want to do a steroid injection in the knee as she had just finished up the viscosupplementation injections about 2 weeks ago. I would like to wait at least till the end of this month before we can say whether or not the viscosupplementation injections helped or not.

## 2021-03-08 ENCOUNTER — OFFICE VISIT (OUTPATIENT)
Dept: ORTHOPEDIC SURGERY | Age: 71
End: 2021-03-08
Payer: MEDICARE

## 2021-03-08 VITALS — WEIGHT: 153 LBS | BODY MASS INDEX: 26.12 KG/M2 | HEIGHT: 64 IN | HEART RATE: 81 BPM | OXYGEN SATURATION: 98 %

## 2021-03-08 DIAGNOSIS — M17.12 ARTHRITIS OF KNEE, LEFT: Primary | ICD-10-CM

## 2021-03-08 PROCEDURE — 20610 DRAIN/INJ JOINT/BURSA W/O US: CPT | Performed by: PHYSICIAN ASSISTANT

## 2021-03-08 PROCEDURE — 99212 OFFICE O/P EST SF 10 MIN: CPT | Performed by: PHYSICIAN ASSISTANT

## 2021-03-08 ASSESSMENT — ENCOUNTER SYMPTOMS
GASTROINTESTINAL NEGATIVE: 1
EYES NEGATIVE: 1
RESPIRATORY NEGATIVE: 1

## 2021-03-08 NOTE — PROGRESS NOTES
Review of Systems   Constitutional: Negative. HENT: Negative. Eyes: Negative. Respiratory: Negative. Cardiovascular: Negative. Gastrointestinal: Negative. Genitourinary: Negative. Musculoskeletal: Positive for arthralgias. Skin: Negative. Negative for rash and wound. Neurological: Negative. Psychiatric/Behavioral: Negative. HPI:  Lucy Méndez is a 66 y.o. female that presents the office today with recurrent left knee pain. Patient states that she had viscosupplementation injections in the knee which did not help. She would like to try a steroid injection in the knee. Pain is along the medial and along the posterior aspect of the knee. She rates her pain today at a 7/10. Past Medical History:   Diagnosis Date    Hyperlipidemia     Hypertension     Thyroid disease        Past Surgical History:   Procedure Laterality Date    CHOLECYSTECTOMY         No family history on file.     Social History     Socioeconomic History    Marital status: Single     Spouse name: Not on file    Number of children: Not on file    Years of education: Not on file    Highest education level: Not on file   Occupational History    Not on file   Social Needs    Financial resource strain: Not on file    Food insecurity     Worry: Not on file     Inability: Not on file    Transportation needs     Medical: Not on file     Non-medical: Not on file   Tobacco Use    Smoking status: Never Smoker    Smokeless tobacco: Never Used   Substance and Sexual Activity    Alcohol use: Yes     Comment: rarely    Drug use: No    Sexual activity: Not on file   Lifestyle    Physical activity     Days per week: Not on file     Minutes per session: Not on file    Stress: Not on file   Relationships    Social connections     Talks on phone: Not on file     Gets together: Not on file     Attends Quaker service: Not on file     Active member of club or organization: Not on file     Attends meetings of clubs or organizations: Not on file     Relationship status: Not on file    Intimate partner violence     Fear of current or ex partner: Not on file     Emotionally abused: Not on file     Physically abused: Not on file     Forced sexual activity: Not on file   Other Topics Concern    Not on file   Social History Narrative    Not on file       Current Outpatient Medications   Medication Sig Dispense Refill    amLODIPine (NORVASC) 5 MG tablet TAKE 1 TABLET BY MOUTH ONCE DAILY      naproxen (NAPROSYN) 500 MG tablet Take 500 mg by mouth as needed for Pain      levothyroxine (SYNTHROID) 88 MCG tablet Take 88 mcg by mouth Daily. No current facility-administered medications for this visit. No Known Allergies    Review of Systems:  See above      Physical Exam:   Pulse 81   Ht 5' 4\" (1.626 m)   Wt 153 lb (69.4 kg)   SpO2 98%   BMI 26.26 kg/m²        Gait is Antalgic. The patient can bear weight on the injured extremity. Gen/Psych:Examination reveals a pleasant individual in no acute distress. The patient is oriented to time, place and person. The patient's mood and affect are appropriate. Patient appears well nourished. Body habitus is normal     Lymph:  No lymphedema in bilateral lower extremities     Skin intact in bilateral lower extremities with no ulcerations, lesions, rash, erythema. Vascular: There are mild varicosities in bilateral lower extremities, sensation intact to light touch over bilateral lower extremities.       left leg/knee exam:  Leg alignment:     neutral  Quadriceps/hamstring atrophy:   no  Knee effusion:    no   Knee erythema:   no  ROM:     5-115°  Varus laxity at 0 and 30 deg's: no  Valguslaxity at 0 and 30 deg's: no  Recurvatum:    no  Tenderness at:   Medial and posterior joint line    Bilateral Knee strength is 5/5 flexion and extension  There is + L2-S1 motor and sensory function in bilateral lower extremities    Outside record review: office notes and x-rays reviewed    X-rays of the left knee show moderate tricompartmental osteoarthritic changes with small osteophytes off the medial tibial plateau. Impression: Left knee DJD    Plan:  Natural history and expected course discussed. Questions answered. Rest, ice, compression, and elevation (RICE) therapy. Patient Instructions   Continue to weight bear as tolerated  Continue range of motion  Ice and elevate as needed  Tylenol or Motrin for pain  Injection given today in the office   Rest for 24-48 hours  Follow up as needed    Left knee injection procedure note    Pre-procedure diagnosis:  Left knee DJD    Post-procedure diagnosis:  same    Procedure: The planned procedure/risks/benefits/alternatives were discussed with the patient. Risks include, but are not limited to, increased pain, drug reaction, infection, bleeding, lack of improvement, neurovascular injury, and increased blood sugar levels. The patient understood and all of their questions were answered. The Left knee was prepped with alcohol then a 22 gauge needle was advanced into the inferior-lateral joint without difficulty. The joint was then injected with 1 ml 1% lidocaine, 1ml of Kenalog (40 mg/ml), 1ml 0.5% bupivacaine the injection site was cleansed with isopropyl alcohol and a band-aid was placed. Complications:  None, the patient tolerated the procedure well. Instructions: The patient was advised to rest the knee and decrease activity for the next 24 to 48 hours. May use prescription or OTC pain relievers as needed for any post-injection pain as well as ice.

## 2021-07-22 ENCOUNTER — HOSPITAL ENCOUNTER (EMERGENCY)
Age: 71
Discharge: HOME | End: 2021-07-22
Attending: EMERGENCY MEDICINE
Payer: MEDICARE

## 2021-07-22 ENCOUNTER — APPOINTMENT (OUTPATIENT)
Dept: CT IMAGING | Age: 71
End: 2021-07-22
Payer: MEDICARE

## 2021-07-22 VITALS
SYSTOLIC BLOOD PRESSURE: 136 MMHG | RESPIRATION RATE: 16 BRPM | BODY MASS INDEX: 26.12 KG/M2 | WEIGHT: 153 LBS | DIASTOLIC BLOOD PRESSURE: 88 MMHG | HEART RATE: 81 BPM | OXYGEN SATURATION: 99 % | HEIGHT: 64 IN | TEMPERATURE: 97.4 F

## 2021-07-22 DIAGNOSIS — H81.399 PERIPHERAL VERTIGO, UNSPECIFIED LATERALITY: Primary | ICD-10-CM

## 2021-07-22 LAB
ANION GAP SERPL CALCULATED.3IONS-SCNC: 15 MMOL/L (ref 4–16)
BASOPHILS ABSOLUTE: 0 K/CU MM
BASOPHILS RELATIVE PERCENT: 0.5 % (ref 0–1)
BUN BLDV-MCNC: 12 MG/DL (ref 6–23)
CALCIUM SERPL-MCNC: 10.2 MG/DL (ref 8.3–10.6)
CHLORIDE BLD-SCNC: 102 MMOL/L (ref 99–110)
CO2: 23 MMOL/L (ref 21–32)
CREAT SERPL-MCNC: 0.7 MG/DL (ref 0.6–1.1)
DIFFERENTIAL TYPE: ABNORMAL
EOSINOPHILS ABSOLUTE: 0.1 K/CU MM
EOSINOPHILS RELATIVE PERCENT: 0.6 % (ref 0–3)
GFR AFRICAN AMERICAN: >60 ML/MIN/1.73M2
GFR NON-AFRICAN AMERICAN: >60 ML/MIN/1.73M2
GLUCOSE BLD-MCNC: 107 MG/DL (ref 70–99)
HCT VFR BLD CALC: 44.6 % (ref 37–47)
HEMOGLOBIN: 15 GM/DL (ref 12.5–16)
IMMATURE NEUTROPHIL %: 0.2 % (ref 0–0.43)
LYMPHOCYTES ABSOLUTE: 2.1 K/CU MM
LYMPHOCYTES RELATIVE PERCENT: 24.4 % (ref 24–44)
MCH RBC QN AUTO: 30.9 PG (ref 27–31)
MCHC RBC AUTO-ENTMCNC: 33.6 % (ref 32–36)
MCV RBC AUTO: 92 FL (ref 78–100)
MONOCYTES ABSOLUTE: 0.8 K/CU MM
MONOCYTES RELATIVE PERCENT: 9.4 % (ref 0–4)
PDW BLD-RTO: 12.5 % (ref 11.7–14.9)
PLATELET # BLD: 369 K/CU MM (ref 140–440)
PMV BLD AUTO: 9.7 FL (ref 7.5–11.1)
POTASSIUM SERPL-SCNC: 3.3 MMOL/L (ref 3.5–5.1)
RBC # BLD: 4.85 M/CU MM (ref 4.2–5.4)
SEGMENTED NEUTROPHILS ABSOLUTE COUNT: 5.7 K/CU MM
SEGMENTED NEUTROPHILS RELATIVE PERCENT: 64.9 % (ref 36–66)
SODIUM BLD-SCNC: 140 MMOL/L (ref 135–145)
TOTAL IMMATURE NEUTOROPHIL: 0.02 K/CU MM
WBC # BLD: 8.8 K/CU MM (ref 4–10.5)

## 2021-07-22 PROCEDURE — 99285 EMERGENCY DEPT VISIT HI MDM: CPT

## 2021-07-22 PROCEDURE — 2580000003 HC RX 258: Performed by: EMERGENCY MEDICINE

## 2021-07-22 PROCEDURE — 80048 BASIC METABOLIC PNL TOTAL CA: CPT

## 2021-07-22 PROCEDURE — 70450 CT HEAD/BRAIN W/O DYE: CPT

## 2021-07-22 PROCEDURE — 93005 ELECTROCARDIOGRAM TRACING: CPT | Performed by: EMERGENCY MEDICINE

## 2021-07-22 PROCEDURE — 6370000000 HC RX 637 (ALT 250 FOR IP): Performed by: EMERGENCY MEDICINE

## 2021-07-22 PROCEDURE — 85025 COMPLETE CBC W/AUTO DIFF WBC: CPT

## 2021-07-22 RX ORDER — 0.9 % SODIUM CHLORIDE 0.9 %
500 INTRAVENOUS SOLUTION INTRAVENOUS ONCE
Status: COMPLETED | OUTPATIENT
Start: 2021-07-22 | End: 2021-07-22

## 2021-07-22 RX ORDER — MECLIZINE HYDROCHLORIDE 25 MG/1
25 TABLET ORAL 3 TIMES DAILY PRN
Qty: 15 TABLET | Refills: 0 | Status: SHIPPED | OUTPATIENT
Start: 2021-07-22 | End: 2021-08-01

## 2021-07-22 RX ORDER — POTASSIUM CHLORIDE 750 MG/1
40 TABLET, FILM COATED, EXTENDED RELEASE ORAL ONCE
Status: COMPLETED | OUTPATIENT
Start: 2021-07-22 | End: 2021-07-22

## 2021-07-22 RX ORDER — MECLIZINE HCL 12.5 MG/1
25 TABLET ORAL ONCE
Status: COMPLETED | OUTPATIENT
Start: 2021-07-22 | End: 2021-07-22

## 2021-07-22 RX ADMIN — POTASSIUM CHLORIDE 40 MEQ: 750 TABLET, EXTENDED RELEASE ORAL at 18:59

## 2021-07-22 RX ADMIN — MECLIZINE 25 MG: 12.5 TABLET ORAL at 18:22

## 2021-07-22 RX ADMIN — SODIUM CHLORIDE 500 ML: 9 INJECTION, SOLUTION INTRAVENOUS at 18:23

## 2021-07-22 NOTE — ED TRIAGE NOTES
Pt reports dizziness x 4 days. Reports she feels like she's floating. Pt has hx of vertigo. Pt was seen at urgent care, but was referred to ed.

## 2021-07-22 NOTE — ED PROVIDER NOTES
Emergency Department Encounter    Patient: Jez Nance  MRN: [id number]  : 1950  Date of Evaluation: 2021  ED Provider:  Rosie Martinez MD    Briefly, Jez Nance is a 66 y.o. female presented to the emergency department for vertiginous dizziness. She has a history of the same. She was seen by previous physician. Please see that note for full HPI.     I have reviewed and interpreted all of the currently available lab results from this visit (if applicable)  Results for orders placed or performed during the hospital encounter of 21   CBC auto diff   Result Value Ref Range    WBC 8.8 4.0 - 10.5 K/CU MM    RBC 4.85 4.2 - 5.4 M/CU MM    Hemoglobin 15.0 12.5 - 16.0 GM/DL    Hematocrit 44.6 37 - 47 %    MCV 92.0 78 - 100 FL    MCH 30.9 27 - 31 PG    MCHC 33.6 32.0 - 36.0 %    RDW 12.5 11.7 - 14.9 %    Platelets 625 464 - 478 K/CU MM    MPV 9.7 7.5 - 11.1 FL    Differential Type AUTOMATED DIFFERENTIAL     Segs Relative 64.9 36 - 66 %    Lymphocytes % 24.4 24 - 44 %    Monocytes % 9.4 (H) 0 - 4 %    Eosinophils % 0.6 0 - 3 %    Basophils % 0.5 0 - 1 %    Segs Absolute 5.7 K/CU MM    Lymphocytes Absolute 2.1 K/CU MM    Monocytes Absolute 0.8 K/CU MM    Eosinophils Absolute 0.1 K/CU MM    Basophils Absolute 0.0 K/CU MM    Immature Neutrophil % 0.2 0 - 0.43 %    Total Immature Neutrophil 0.02 K/CU MM   BMP   Result Value Ref Range    Sodium 140 135 - 145 MMOL/L    Potassium 3.3 (L) 3.5 - 5.1 MMOL/L    Chloride 102 99 - 110 mMol/L    CO2 23 21 - 32 MMOL/L    Anion Gap 15 4 - 16    BUN 12 6 - 23 MG/DL    CREATININE 0.7 0.6 - 1.1 MG/DL    Glucose 107 (H) 70 - 99 MG/DL    Calcium 10.2 8.3 - 10.6 MG/DL    GFR Non-African American >60 >60 mL/min/1.73m2    GFR African American >60 >60 mL/min/1.73m2   EKG 12 Lead   Result Value Ref Range    Ventricular Rate 81 BPM    Atrial Rate 81 BPM    P-R Interval 120 ms    QRS Duration 114 ms    Q-T Interval 394 ms    QTc Calculation (Bazett) 457 ms    P Axis 43 degrees    R Axis 9 degrees    T Axis 5 degrees    Diagnosis       Normal sinus rhythm  Possible Left atrial enlargement  Incomplete right bundle branch block  Nonspecific T wave abnormality  Abnormal ECG  No previous ECGs available        Radiographs (if obtained):    [] Radiologist's Report Reviewed:  CT HEAD WO CONTRAST   Final Result   No acute intracranial abnormality. Chronic microvascular ischemic changes and global cerebral atrophy. MDM:    Patient presented to the emergency department for vertiginous dizziness. She has a history of vertigo. She was seen by previous physician. Please see that note for full details of care until transition of care. She was signed out to me pending return of her CT scan. CT of her head was unremarkable. She had been given meclizine in the emergency department. On reassessment her symptoms are improved. Her stroke scale is zero. Neuro exam is nonfocal.  She can ambulate without difficulty. She is currently asymptomatic. She is discharged home. She will follow-up outpatient. Return precautions for worsening concerning symptoms are discussed. Potassium was mildly low in the emergency department she was given oral replacement emergency department as well as prescribed some potassium placement for home. Clinical Impression:  1. Peripheral vertigo, unspecified laterality      Disposition referral (if applicable):  Richard Velez MD  [address] [facility]  [phone]    In 2 days      Disposition medications (if applicable):  New Prescriptions    MECLIZINE (ANTIVERT) 25 MG TABLET    Take 1 tablet by mouth 3 times daily as needed for Dizziness       Comment: Please note this report has been produced using speech recognition software and may contain errors related to that system including errors in grammar, punctuation, and spelling, as well as words and phrases that may be inappropriate.   Efforts were made to edit the dictations.       Malina Estrada MD  07/22/21 [id number]

## 2021-07-22 NOTE — ED PROVIDER NOTES
Emergency Department Encounter    Patient: Dwaine Wilson  MRN: [id number]  : 1950  Date of Evaluation: 2021  ED Provider:  Washington Sheikh MD    Triage Chief Complaint:   Dizziness (intermittent x4 days) and Fatigue    Clinical Impression:  1. Peripheral vertigo, unspecified laterality      Disposition referral (if applicable):  Kayleigh Soto MD  [address]  [phone]    In 2 days      ED Provider Disposition Time  DISPOSITION Decision To Discharge 2021 07:38:36 PM       MDM:  Patient with history of peripheral vertigo presents with intermittent dizziness consistent with peripheral vertigo as below. No evidence of central etiology on exam.  Patient treated with Antivert in the emergency department with improvement in symptoms. EKG without evidence of ACS or arrhythmia. No leukocytosis or anemia. No significant electrolyte abnormalities with the exception of potassium noted to be 3.3 which was repleted in the emergency department. No significant hyper or hypoglycemia. Head CT will be obtained. This is currently pending. Patient will be signed out to Dr. Elmo Rocha for follow-up of head CT and appropriate disposition. If head CT is negative, I anticipate this patient will be discharged home with strict return precautions and follow-up instructions. Patient will be given outpatient symptomatic treatment with Antivert. Patient has a normal neurologic exam without evidence clinically of acute intracranial hemorrhage, mass, large infarct, CVA/TIA.     Medications ordered in the ED:  ED Medication Orders (From admission, onward)    Start Ordered     Status Ordering Provider    21 1900 21 1856  potassium chloride (KLOR-CON) extended release tablet 40 mEq  ONCE      Last MAR action: Given - by Ike Damian on 21 at [address] [place] [address]    21 1830 21 1818  meclizine (ANTIVERT) tablet 25 mg  ONCE      Last MAR action: Given - by Cherelleamor Lieberman on 07/22/21 at [facility] [address] [facility]    07/22/21 1830 07/22/21 1818  0.9 % sodium chloride bolus  ONCE      Last MAR action: Stopped - by Ramona Panda on 07/22/21 at [facility] [address]          Disposition medications (if applicable):  Discharge Medication List as of 7/22/2021  7:41 PM      START taking these medications    Details   meclizine (ANTIVERT) 25 MG tablet Take 1 tablet by mouth 3 times daily as needed for Dizziness, Disp-15 tablet, R-0Normal               HPI:  Vito Young is a 66 y.o. female that presents complaining of 4-day history of intermittent dizziness which she describes as things are moving and that she is floating. Patient has a history of vertigo and states that the symptoms are similar to her previous vertigo episodes. Patient states that if she holds her head still, the vertigo will stop briefly but with movement of the head, vertigo will resume. Patient denies chest pain, palpitations, lightheadedness. No shortness of breath. No recent changes in vision/speech/swallowing, focal weakness or changes in sensation. Patient states that she does feel generally weak. No recent illnesses. No treatment prior to presentation. Symptoms are moderate in severity.     ROS - see HPI, below listed is current ROS at time of my eval:  General:  No fevers  Eyes:  No eye discharge  ENT:  No ear discharge  Cardiovascular:  No palpitations  Respiratory:  No wheezing  Gastrointestinal:  No hematemesis  Musculoskeletal:  No muscle pain  Skin:  No purpura  Neurologic:  No headache  Psychiatric:  No homicidal ideation  Genitourinary:  No hematuria  Endocrine:  No unexpected weight gain  Extremities:  No edema    Physical Exam:  Triage VS:    ED Triage Vitals [07/22/21 1806]   Enc Vitals Group      /88      Pulse 81      Resp 16      Temp 97.4 °F (36.3 °C)      Temp src       SpO2 99 %      Weight 153 lb (69.4 kg)      Height 5' 4\" (1.626 m)      Head Circumference       Peak Flow Pain Score       Pain Loc       Pain Edu? Excl. in [address]? General appearance:  No acute distress. Skin:  Warm. Dry. Eye:  Extraocular movements intact. Ears, nose, mouth and throat:  Oral mucosa moist   Neck:  Trachea midline. Extremity:  No swelling. Normal ROM     Heart:  Regular rate and rhythm, normal S1 & S2, no extra heart sounds. Perfusion:  intact  Respiratory:  Lungs clear to auscultation bilaterally. Respirations nonlabored. Back:  No CVA tenderness to palpation     Neurological:  Alert and oriented times 3. No focal neuro deficits. Renal nerves II through XII grossly intact. Strength 5 out of 5 throughout. Sensation intact to light touch throughout. Patient has coordination intact finger-to-nose and heel-to-shin bilaterally. Psychiatric:  Appropriate    Past Medical History:   Diagnosis Date    Hyperlipidemia     Hypertension     Thyroid disease      Past Surgical History:   Procedure Laterality Date    CHOLECYSTECTOMY       History reviewed. No pertinent family history. Social History     Socioeconomic History    Marital status: Single     Spouse name: Not on file    Number of children: Not on file    Years of education: Not on file    Highest education level: Not on file   Occupational History    Not on file   Tobacco Use    Smoking status: Never Smoker    Smokeless tobacco: Never Used   Substance and Sexual Activity    Alcohol use: Yes     Comment: rarely    Drug use: No    Sexual activity: Not on file   Other Topics Concern    Not on file   Social History Narrative    Not on file     Social Determinants of Health     Financial Resource Strain:     Difficulty of Paying Living Expenses:    Food Insecurity:     Worried About Running Out of Food in the Last Year:     [address] in the Last Year:    Transportation Needs:     Lack of Transportation (Medical):      Lack of Transportation (Non-Medical):    Physical Activity:     Neutrophil % 0.2 0 - 0.43 %    Total Immature Neutrophil 0.02 K/CU MM   BMP   Result Value Ref Range    Sodium 140 135 - 145 MMOL/L    Potassium 3.3 (L) 3.5 - 5.1 MMOL/L    Chloride 102 99 - 110 mMol/L    CO2 23 21 - 32 MMOL/L    Anion Gap 15 4 - 16    BUN 12 6 - 23 MG/DL    CREATININE 0.7 0.6 - 1.1 MG/DL    Glucose 107 (H) 70 - 99 MG/DL    Calcium 10.2 8.3 - 10.6 MG/DL    GFR Non-African American >60 >60 mL/min/1.73m2    GFR African American >60 >60 mL/min/1.73m2   EKG 12 Lead   Result Value Ref Range    Ventricular Rate 81 BPM    Atrial Rate 81 BPM    P-R Interval 120 ms    QRS Duration 120 ms    Q-T Interval 394 ms    QTc Calculation (Bazett) 457 ms    P Axis 43 degrees    R Axis 9 degrees    T Axis 5 degrees    Diagnosis       Normal sinus rhythm  Possible Left atrial enlargement  Right bundle branch block  Nonspecific T wave abnormality  Abnormal ECG  No previous ECGs available  Confirmed by [facility] Marielena STEENpool ([id number]) on 7/23/2021 9:18:25 AM        Radiographs (if obtained):  Radiologist's Report Reviewed:  CT HEAD WO CONTRAST   Final Result   No acute intracranial abnormality. Chronic microvascular ischemic changes and global cerebral atrophy. EKG (if obtained): (All EKG's are interpreted by myself in the absence of a cardiologist)    Rate 81, sinus rhythm, normal axis, normal intervals, no acute ST-T wave changes. Comment: Please note this report has been produced using speech recognition software and may contain errors related to that system including errors in grammar, punctuation, and spelling, as well as words and phrases that may be inappropriate. Efforts were made to edit the dictations.         Nabila Lazcano MD  07/23/21 2043

## 2021-07-23 LAB
EKG ATRIAL RATE: 81 BPM
EKG DIAGNOSIS: NORMAL
EKG P AXIS: 43 DEGREES
EKG P-R INTERVAL: 120 MS
EKG Q-T INTERVAL: 394 MS
EKG QRS DURATION: 120 MS
EKG QTC CALCULATION (BAZETT): 457 MS
EKG R AXIS: 9 DEGREES
EKG T AXIS: 5 DEGREES
EKG VENTRICULAR RATE: 81 BPM

## 2021-07-23 PROCEDURE — 93010 ELECTROCARDIOGRAM REPORT: CPT | Performed by: INTERNAL MEDICINE

## 2021-08-27 ENCOUNTER — OFFICE VISIT (OUTPATIENT)
Dept: ORTHOPEDIC SURGERY | Age: 71
End: 2021-08-27
Payer: MEDICARE

## 2021-08-27 VITALS
HEART RATE: 82 BPM | WEIGHT: 151 LBS | HEIGHT: 64 IN | OXYGEN SATURATION: 96 % | BODY MASS INDEX: 25.78 KG/M2 | RESPIRATION RATE: 16 BRPM

## 2021-08-27 DIAGNOSIS — M17.12 PRIMARY OSTEOARTHRITIS OF LEFT KNEE: Primary | ICD-10-CM

## 2021-08-27 PROCEDURE — 99212 OFFICE O/P EST SF 10 MIN: CPT | Performed by: PHYSICIAN ASSISTANT

## 2021-08-27 PROCEDURE — 20610 DRAIN/INJ JOINT/BURSA W/O US: CPT | Performed by: PHYSICIAN ASSISTANT

## 2021-08-27 ASSESSMENT — ENCOUNTER SYMPTOMS
GASTROINTESTINAL NEGATIVE: 1
EYES NEGATIVE: 1
RESPIRATORY NEGATIVE: 1

## 2021-08-27 NOTE — PATIENT INSTRUCTIONS
Steroid injection  Rest the knee for 24 - 48 hours  May continue to take Advil as needed  Rest, ice, and elevate as needed  Weightbearing and activities as tolerated  Work on ROM and strengthening exercises per therapy protocol  PT ordered  Follow up as needed

## 2021-08-27 NOTE — PROGRESS NOTES
I reviewed and agree with the portions of the HPI, review of systems, vital documentation and plan performed by my staff and have added/addended where appropriate. Review of Systems   Constitutional: Negative. HENT: Negative. Eyes: Negative. Respiratory: Negative. Cardiovascular: Negative. Gastrointestinal: Negative. Genitourinary: Negative. Musculoskeletal: Positive for arthralgias and myalgias. Skin: Negative. Neurological: Negative. Psychiatric/Behavioral: Negative. HPI:  Lala Mares is a 66 y.o. female that presents to the left knee pain. Patient's last steroid injection was administered on 3/8/21 with patient unsure of the length of the time that she was provided relief. Pain is rated today at a 6/10 along the posterior-lateral aspect of the knee with pain radiating upwards along the posterior thigh and buttocks with the knee feeling unstable and limited mobility. She describes her pain as a severe aching sensation with no new injury reported today. She continues to take Advil as needed which is providing her relief. Past Medical History:   Diagnosis Date    Hyperlipidemia     Hypertension     Thyroid disease        Past Surgical History:   Procedure Laterality Date    CHOLECYSTECTOMY         No family history on file.     Social History     Socioeconomic History    Marital status: Single     Spouse name: None    Number of children: None    Years of education: None    Highest education level: None   Occupational History    None   Tobacco Use    Smoking status: Never Smoker    Smokeless tobacco: Never Used   Substance and Sexual Activity    Alcohol use: Yes     Comment: rarely    Drug use: No    Sexual activity: None   Other Topics Concern    None   Social History Narrative    None     Social Determinants of Health     Financial Resource Strain:     Difficulty of Paying Living Expenses:    Food Insecurity:     Worried About Running Out of Food in the Last Year:    [address] in the Last Year:    Transportation Needs:     Lack of Transportation (Medical):  Lack of Transportation (Non-Medical):    Physical Activity:     Days of Exercise per Week:     Minutes of Exercise per Session:    Stress:     Feeling of Stress :    Social Connections:     Frequency of Communication with Friends and Family:     Frequency of Social Gatherings with Friends and Family:     Attends Mosque Services:     Active Member of Clubs or Organizations:     Attends Club or Organization Meetings:     Marital Status:    Intimate Partner Violence:     Fear of Current or Ex-Partner:     Emotionally Abused:     Physically Abused:     Sexually Abused:        Current Outpatient Medications   Medication Sig Dispense Refill    amLODIPine (NORVASC) 5 MG tablet TAKE 1 TABLET BY MOUTH ONCE DAILY      naproxen (NAPROSYN) 500 MG tablet Take 500 mg by mouth as needed for Pain      levothyroxine (SYNTHROID) 88 MCG tablet Take 88 mcg by mouth Daily. No current facility-administered medications for this visit. No Known Allergies    Review of Systems:  See above      Physical Exam:   Pulse 82   Resp 16   Ht 5' 4\" (1.626 m)   Wt 151 lb (68.5 kg)   SpO2 96%   BMI 25.92 kg/m²        Gait is Antalgic. The patient can bear weight on the injured extremity. Gen/Psych:Examination reveals a pleasant individual in no acute distress. The patient is oriented to time, place and person. The patient's mood and affect are appropriate. Patient appears well nourished. Body habitus is normal     Lymph:  no lymphedema in bilateral lower extremities     Skin intact in bilateral lower extremities with no ulcerations, lesions, rash, erythema. Vascular: There are mild varicosities in bilateral lower extremities, sensation intact to light touch over bilateral lower extremities.       left leg/knee exam:  Leg alignment:     neutral  Quadriceps/hamstring atrophy: no  Knee effusion:    no   Knee erythema:   no  ROM:     5-120 degrees  Varus laxity at 0 and 30 deg's: no  Valguslaxity at 0 and 30 deg's: no  Recurvatum:    no  Tenderness at:   Posterior, hamstrings, lateral joint line    Bilateral Knee strength is 5/5 flexion and extension  There is + L2-S1 motor and sensory function in bilateral lower extremities    Outside record review: office notes and x-rays reviewed    X-rays of the left knee reviewed showed moderate degenerative change with joint space narrowing in the medial compartment. Impression:  left knee DJD      Plan:    Patient Instructions   Steroid injection  Rest the knee for 24 - 48 hours  May continue to take Advil as needed  Rest, ice, and elevate as needed  Weightbearing and activities as tolerated  Work on ROM and strengthening exercises per therapy protocol  PT ordered  Follow up as needed    Left knee injection procedure note    Pre-procedure diagnosis:  Left knee DJD    Post-procedure diagnosis:  same    Procedure: The planned procedure/risks/benefits/alternatives were discussed with the patient. Risks include, but are not limited to, increased pain, drug reaction, infection, bleeding, lack of improvement, neurovascular injury, and increased blood sugar levels. The patient understood and all of their questions were answered. The Left knee was prepped with alcohol then a 22 gauge needle was advanced into the inferior-lateral joint without difficulty. The joint was then injected with 1 ml 1% lidocaine, 1ml of Kenalog (40 mg/ml), 1ml 0.5% bupivacaine the injection site was cleansed with isopropyl alcohol and a band-aid was placed. Complications:  None, the patient tolerated the procedure well. Instructions: The patient was advised to rest the knee and decrease activity for the next 24 to 48 hours. May use prescription or OTC pain relievers as needed for any post-injection pain as well as ice.

## 2021-09-22 ENCOUNTER — HOSPITAL ENCOUNTER (OUTPATIENT)
Dept: PHYSICAL THERAPY | Age: 71
Setting detail: THERAPIES SERIES
Discharge: HOME | End: 2021-09-22
Payer: MEDICARE

## 2021-09-22 PROCEDURE — 97110 THERAPEUTIC EXERCISES: CPT

## 2021-09-22 PROCEDURE — 97162 PT EVAL MOD COMPLEX 30 MIN: CPT

## 2021-09-22 ASSESSMENT — PAIN SCALES - GENERAL: PAINLEVEL_OUTOF10: 8

## 2021-09-22 NOTE — PLAN OF CARE
Outpatient Physical Therapy                  [x] Phone: [phone]   Fax: [phone]    Pediatric Therapy                                    [] Phone: [phone]   Fax: [phone]  Pediatric [facility]a Peto                                      [] Phone: [phone]   Fax: [phone]      To: Referring Practitioner: AVERY Vasquez    From: Donnell Archer, PT, PT     Patient: Gwendolyn Lopez       : 1950  Diagnosis: OA LE knee. Treatment Diagnosis: Knee OA, radiculopathy   Date: 2021    Physical Therapy Certification/Re-Certification Form  Dear Lenny Reese, [facility]  The following patient has been evaluated for physical therapy services and for therapy to continue, Please review the attached evaluation and/or summary of the patient's plan of care, and verify that you agree therapy should continue by signing the attached document and sending it back to our office. Patient is a  67 yo female who presents with R knee, posterior thigh pain which impacts on adls;patient's goal is to decrease pain ;patient reports that pain  limits activities including standing, walking, steps; PT to address patient's goals, impairments and activity limitations with skilled interventions checked in plan of care;patient's level of function prior to onset of symptoms was independent; did not observe any barriers to learning during PT eval; learning preferences include demonstration, practice, and handouts; patient expressed understanding of HEP; patient appears to be motivated to participate in an active PT program and to be compliant with HEP expectations;patient assisted in developing treatment plan and goals; no DME is currently being used;      Current functional level (based on LEFS)   :33    Assessment:  Assessment: Pt presents with chief complaint of posterior thigh pain and mild medial knee pain. Symptoms worse with standing/walking a few minutes.  Posterior thigh symptoms responded to back and hip stretching. xray shows marked degenerative osteo arthritic changes involve the medialfemoral tibial joint compartment with medial subluxation of the femur. She has pain limited strength at her knee and hip. Plan of Care/Treatment to date:  [x] Therapeutic Exercise    [] Aquatics:  [x] Therapeutic Activity    [] Ultrasound  [] Elec Stimulation  [x] Gait Training     [] Cervical Traction [] Lumbar Traction  [x] Neuromuscular Re-education [] Cold/hotpack [] Iontophoresis   [x] Instruction in HEP       [x] Manual Therapy     [] vasopneumatic            [] Self care home management        []Dry needling trigger point point/pain management          Frequency/Duration:  # Days per week: [x] 1 day # Weeks: [] 1 week [] 5 weeks     [] 2 days   [] 2 weeks [x] 6 weeks     [] 3 days   [] 3 weeks [] 7 weeks     [] 4 days   [] 4 weeks [] 8 weeks    Rehab Potential/Progress: [] Excellent [] Good [] Fair  [] Poor     Goals:       Long term goals  Time Frame for Long term goals : 6 weeks  Long term goal 1: I in home program.  Long term goal 2: stand/walk 45mins with minimal pain. Long term goal 3: up/down steps with reciprocal pattern with minimal pain. Long term goal 4: 0 deg extension. Electronically signed by:  Cece Neumann PT,9/22/2021, 1:14 PM              If you have any questions or concerns, please don't hesitate to call.   Thank you for your referral.      Physician Signature:_________________Date:____________Time: ________  By signing above, therapists plan is approved by physician

## 2021-09-22 NOTE — PROGRESS NOTES
Physical Therapy  Initial Assessment  Date: 2021  Patient Name: Jennifer Lucio  MRN: [id number]  : 1950     Treatment Diagnosis: Knee OA, radiculopathy    Restrictions       Subjective   General  Additional Pertinent Hx: L knee pain for years, progressively worsening.  has posterior thigh, gluteal and knee pain wtih standing few minutes. some medial knee pain. Posterior thigh pain is the worst.  relief wtih sitting. Pain with steps - worse ascending. uses a cane rarely. Referring Practitioner: Jase Lara [facility]  Referral Date : 21  Diagnosis: OA LE knee. PT Visit Information  PT Insurance Information: [employer]. Subjective  Subjective: L medial knee pain with worst pain at post thigh, gluteal.  Pain Screening  Patient Currently in Pain: Yes  Pain Assessment  Pain Assessment: 0-10  Pain Level: 8  Vital Signs  Patient Currently in Pain: Yes    Vision/Hearing  Vision  Vision: Impaired  Hearing  Hearing: Within functional limits    Orientation  Orientation  Overall Orientation Status: Within Normal Limits    Social/Functional History  Social/Functional History  Type of Home: House  Home Layout: One level  ADL Assistance: Independent  Homemaking Assistance: Independent  Homemaking Responsibilities: Yes  Ambulation Assistance: Independent  Transfer Assistance: Independent  Active : Yes  Occupation: Retired  Leisure & Hobbies: travel -    Objective     Observation/Palpation  Palpation: tenderness at all HS mms, mild at medial joint line and ant/lat joint line. Observation: increase valgus    AROM LLE (degrees)  LLE General AROM: -8ext, 140 flexion. Strength RLE  Comment: myotomes intact  Strength LLE  Comment: pain limited knee flexion and extension 4-/5 pain at post knee and thigh. Additional Measures  Special Tests: decreased LE symptoms with spinal mobility - greatest relief with flexion bias.   Sensation  Overall Sensation Status:  (dermatomes intact to lt touch.) Assessment   Conditions Requiring Skilled Therapeutic Intervention  Assessment: Pt presents with chief complaint of posterior thigh pain and mild medial knee pain. Symptoms worse with standing/walking a few minutes. Posterior thigh symptoms responded to back and hip stretching. xray shows marked degenerative osteo arthritic changes involve the medialfemoral tibial joint compartment with medial subluxation of the femur. She has pain limited strength at her knee and hip. Treatment Diagnosis: Knee OA, radiculopathy  REQUIRES PT FOLLOW UP: Yes            Goals  Long term goals  Time Frame for Long term goals : 6 weeks  Long term goal 1: I in home program.  Long term goal 2: stand/walk 45mins with minimal pain. Long term goal 3: up/down steps with reciprocal pattern with minimal pain. Long term goal 4: 0 deg extension.   Patient Goals   Patient goals : decrease pain          Jordyn Moulton, PT

## 2021-09-22 NOTE — PRE-CERTIFICATION NOTE
Patient approved for 9 visits including treatment done on day of eval.      Auth# [id number]  9/22//21

## 2021-09-22 NOTE — FLOWSHEET NOTE
Outpatient Physical Therapy  [place]           [x] Phone: [phone]   Fax: [phone]  [place]           [] Phone: [phone]   Fax: [phone]        Physical Therapy Daily Treatment Note  Date:  2021    Patient Name:  Sean Toledo    :  1950  MRN: [id number]  Restrictions/Precautions:    Diagnosis:   Diagnosis: OA LE knee. Date of Injury/Surgery:   Treatment Diagnosis:      Insurance/Certification information: PT Insurance Information: Edd. Referring Physician:  Referring Practitioner: Inga Dick [facility]  Next Doctor Visit:    Plan of care signed (Y/N):  n  Outcome Measure:   Visit# / total visits:   pending  Pain level: 8/10   Goals:     Patient goals : decrease pain     Long term goals  Time Frame for Long term goals : 6 weeks  Long term goal 1: I in home program.  Long term goal 2: stand/walk 45mins with minimal pain. Long term goal 3: up/down steps with reciprocal pattern with minimal pain. Long term goal 4: 0 deg extension. Summary of Evaluation: Assessment: Pt presents with chief complaint of posterior thigh pain and mild medial knee pain. Symptoms worse with standing/walking a few minutes. xray shows marked degenerative osteo arthritic changes involve the medialfemoral tibial joint compartment with medial subluxation of the femur. She has pain limited strength        Subjective:  See eval         Any changes in Ambulatory Summary Sheet? None        Objective:  See eval   COVID screening questions were asked and patient attested that there had been no contact or symptoms        Exercises: (No more than 4 columns)   Exercise/Equipment Date 21 Date Date           WARM UP         Nu step            TABLE      DKTC      Hip gluteal stretching Modified piriformis, knee to opp hip. SLR      bridge               STANDING      Heel raise      HS stretch      Step up/down.                                     PROPRIOCEPTION MODALITIES                      Other Therapeutic Activities/Education:  Ed on centralization/peripheralization, interactions of low back, hip and knee      Home Exercise Program:  Modified piriformis stretch, knee to opp hip stretch, supine double knee to chest.        Manual Treatments:        Modalities:        Communication with other providers:        Assessment:  (Response towards treatment session) (Pain Rating)    Assessment: Pt presents with chief complaint of posterior thigh pain and mild medial knee pain. Symptoms worse with standing/walking a few minutes. xray shows marked degenerative osteo arthritic changes involve the medialfemoral tibial joint compartment with medial subluxation of the femur.   She has pain limited strength      Plan for Next Session:        Time In / Time Out:   0930 /1027        Timed Code/Total Treatment Minutes:   24/53      Next Progress Note due:        Plan of Care Interventions:  [x] Therapeutic Exercise  [] Modalities:  [x] Therapeutic Activity     [] Ultrasound  [] Estim  [] Gait Training      [] Cervical Traction [] Lumbar Traction  [x] Neuromuscular Re-education    [] Cold/hotpack [] Iontophoresis   [x] Instruction in HEP      [] Vasopneumatic   [] Dry Needling    [x] Manual Therapy               [] Aquatic Therapy              Electronically signed by:  Phyllis Marquis PT, 9/22/2021, 10:32 AM

## 2021-09-29 ENCOUNTER — HOSPITAL ENCOUNTER (OUTPATIENT)
Dept: PHYSICAL THERAPY | Age: 71
Setting detail: THERAPIES SERIES
Discharge: HOME | End: 2021-09-29
Payer: MEDICARE

## 2021-09-29 PROCEDURE — 97110 THERAPEUTIC EXERCISES: CPT

## 2021-09-29 PROCEDURE — 97140 MANUAL THERAPY 1/> REGIONS: CPT

## 2021-09-29 NOTE — FLOWSHEET NOTE
Outpatient Physical Therapy  [place]           [x] Phone: [phone]   Fax: [phone]  [place]           [] Phone: [phone]   Fax: [phone]        Physical Therapy Daily Treatment Note  Date:  2021    Patient Name:  Savannah Childs    :  1950  MRN: [id number]  Restrictions/Precautions: None  Diagnosis:   L knee OA  Date of Injury/Surgery:   Treatment Diagnosis:     Insurance/Certification information:  [place]. Patient approved for 9 visits including treatment done on day of eval.  Auth# [id number]  21-  Referring Physician:  Referring Practitioner: Soila Morris [facility]  Next Doctor Visit:    Plan of care signed (Y/N):  n  Outcome Measure:   Visit# / total visits:    Pain level: 5-/10       Goals:     Patient goals : decrease pain     Long term goals  Time Frame for Long term goals : 6 weeks  Long term goal 1: I in home program. Reports compliance   Long term goal 2: stand/walk 45mins with minimal pain. Long term goal 3: up/down steps with reciprocal pattern with minimal pain. Long term goal 4: 0 deg extension. Summary of Evaluation: Assessment: Pt presents with chief complaint of posterior thigh pain and mild medial knee pain. Symptoms worse with standing/walking a few minutes. xray shows marked degenerative osteo arthritic changes involve the medialfemoral tibial joint compartment with medial subluxation of the femur. She has pain limited strength        Subjective:  Man Corral arrives to therapy stating that the knee is okay, moderate pain today. Reports compliance with HEP, exercises are going well, feels improvement in the symptoms but still has stiffness. Any changes in Ambulatory Summary Sheet? None        Objective:  COVID screening questions were asked and patient attested that there had been no contact or symptoms    Had to modify ROM with nu-step secondary to increased pain in the back of the knee.  Increased pain behind the knee with SLR when asked to keep the foot in a more DF position. Tight/tender posterior knee and HS muscles. Exercises: (No more than 4 columns)   Exercise/Equipment Date 9/22/21 9/29/2021 #2 Date           WARM UP      Nu step  S6/A7/L3 5' modified range secondary to pain          TABLE      DKTC  RSB 15*    Hip gluteal stretching Modified piriformis, knee to opp hip. --    SLR  2*10    bridge  2*10    HS stretch   --    Calf stretch with green strap   3*20\"                   STANDING      Heel raise  2*10    Anterior step up   4\" 10*                                 PROPRIOCEPTION                                    MODALITIES                      Other Therapeutic Activities/Education:  None       Home Exercise Program:  Modified piriformis stretch, knee to opp hip stretch, supine double knee to chest.        Manual Treatments:  Manual KTC, HS, and piriformis stretching       Modalities:  None       Communication with other providers:  None       Assessment:  Elvia tolerated today's session fair. She demonstrates increased pain with most activities and appears quite anxious about moving/using the leg. End session pain: 4-5/10     Plan for Next Session: gentle LE strengthening to tolerance.        Time In / Time Out:   1430/1508        Timed Code/Total Treatment Minutes:  45' 1 man 8' 2 TE 30'      Next Progress Note due:        Plan of Care Interventions:  [x] Therapeutic Exercise  [] Modalities:  [x] Therapeutic Activity     [] Ultrasound  [] Estim  [] Gait Training      [] Cervical Traction [] Lumbar Traction  [x] Neuromuscular Re-education    [] Cold/hotpack [] Iontophoresis   [x] Instruction in HEP      [] Vasopneumatic   [] Dry Needling    [x] Manual Therapy               [] Aquatic Therapy              Electronically signed by:  Mehdi Gregory     9/29/2021, 9:19 AM

## 2021-10-01 ENCOUNTER — HOSPITAL ENCOUNTER (OUTPATIENT)
Dept: PHYSICAL THERAPY | Age: 71
Setting detail: THERAPIES SERIES
Discharge: HOME | End: 2021-10-01
Payer: MEDICARE

## 2021-10-01 PROCEDURE — 97110 THERAPEUTIC EXERCISES: CPT

## 2021-10-01 NOTE — FLOWSHEET NOTE
Outpatient Physical Therapy  [place]           [x] Phone: [phone]   Fax: [phone]  [facility]           [] Phone: [phone]   Fax: [phone]        Physical Therapy Daily Treatment Note  Date:  10/1/2021    Patient Name:  Jennifer Tobar    :  1950  MRN: [id number]  Restrictions/Precautions: None  Diagnosis:   L knee OA  Date of Injury/Surgery:   Treatment Diagnosis:     Insurance/Certification information:  [employer]. Patient approved for 9 visits including treatment done on day of eval.  Auth# [id number]  21-  Referring Physician:  Referring Practitioner: Feliberto Tidwell [facility]  Next Doctor Visit:    Plan of care signed (Y/N):  n  Outcome Measure:   Visit# / total visits:  3/9  Pain level: 3-4/10       Goals:     Patient goals : decrease pain     Long term goals  Time Frame for Long term goals : 6 weeks  Long term goal 1: I in home program. Reports compliance   Long term goal 2: stand/walk 45mins with minimal pain. Long term goal 3: up/down steps with reciprocal pattern with minimal pain. Long term goal 4: 0 deg extension. Summary of Evaluation: Assessment: Pt presents with chief complaint of posterior thigh pain and mild medial knee pain. Symptoms worse with standing/walking a few minutes. xray shows marked degenerative osteo arthritic changes involve the medialfemoral tibial joint compartment with medial subluxation of the femur. She has pain limited strength        Subjective:   Rohini Wan arrives to therapy stating that the leg remains uncomfortable and stiff, mostly in the back. Rohini Wan states that she was stiff and sore after her last session but nothing out of the ordinary. Any changes in Ambulatory Summary Sheet? None        Objective:  COVID screening questions were asked and patient attested that there had been no contact or symptoms    Painful with nearly all knee related exercises, does well with hip targeted strengthening.    Difficulty relaxing for stick rolling. Exercises: (No more than 4 columns)   Exercise/Equipment Date 9/22/21 9/29/2021 #2 10/1/2021 #3           WARM UP      Nu step  S6/A7/L3 5' modified range secondary to pain 5'         TABLE      DKTC  RSB 15* RSB 15*   Hip gluteal stretching Modified piriformis, knee to opp hip.  -- --   SLR  2*10 2*10   bridge  2*10 2*10   HS stretch   -- --   Calf stretch with green strap   3*20\" 2*30\"    LAQ   2*10   Fitter LP    2 blue cords 2*10                  STANDING      Heel raise  2*10    Anterior step up   4\" 10* --   Hip abduction B       Hip adduction B                        PROPRIOCEPTION                                    MODALITIES                            Other Therapeutic Activities/Education:  None       Home Exercise Program:  Modified piriformis stretch, knee to opp hip stretch, supine double knee to chest.        Manual Treatments: Stick rolling to HS and gastroc 5'      Modalities:  None       Communication with other providers:  None       Assessment:  Elvia tolerates session fair. She continues to have increased pain with most activities and has to modify range for many exercises secondary to pain. She is anxious and does not tolerate manual interventions well. End session pain: same     Plan for Next Session: gentle LE strengthening to tolerance.        Time In / Time Out:  0930/1008        Timed Code/Total Treatment Minutes:  45'  3 TE       Next Progress Note due:        Plan of Care Interventions:  [x] Therapeutic Exercise  [] Modalities:  [x] Therapeutic Activity     [] Ultrasound  [] Estim  [] Gait Training      [] Cervical Traction [] Lumbar Traction  [x] Neuromuscular Re-education    [] Cold/hotpack [] Iontophoresis   [x] Instruction in HEP      [] Vasopneumatic   [] Dry Needling    [x] Manual Therapy               [] Aquatic Therapy              Electronically signed by:  Malcolm Avina     10/1/2021, 6:49 AM

## 2021-10-05 ENCOUNTER — HOSPITAL ENCOUNTER (OUTPATIENT)
Dept: PHYSICAL THERAPY | Age: 71
Setting detail: THERAPIES SERIES
Discharge: HOME | End: 2021-10-05
Payer: MEDICARE

## 2021-10-05 PROCEDURE — 97110 THERAPEUTIC EXERCISES: CPT

## 2021-10-05 NOTE — FLOWSHEET NOTE
Outpatient Physical Therapy  [place]           [x] Phone: [phone]   Fax: [phone]  [place]           [] Phone: [phone]   Fax: [phone]        Physical Therapy Daily Treatment Note  Date:  10/5/2021    Patient Name:  Maria De Jesus Moreira    :  1950  MRN: [id number]  Restrictions/Precautions: None  Diagnosis:   L knee OA  Date of Injury/Surgery:   Treatment Diagnosis:     Insurance/Certification information:  [employer]. Patient approved for 9 visits including treatment done on day of eval.  Auth# [id number]  21-  Referring Physician:  Referring Practitioner: Alexandre Ozuna [facility]  Next Doctor Visit:    Plan of care signed (Y/N):  n  Outcome Measure:   Visit# / total visits:    Pain level: 3-4/10       Goals:     Patient goals : decrease pain     Long term goals  Time Frame for Long term goals : 6 weeks  Long term goal 1: I in home program. Reports compliance   Long term goal 2: stand/walk 45mins with minimal pain. Not met   Long term goal 3: up/down steps with reciprocal pattern with minimal pain. Not met   Long term goal 4: 0 deg extension. Not met     Summary of Evaluation: Assessment: Pt presents with chief complaint of posterior thigh pain and mild medial knee pain. Symptoms worse with standing/walking a few minutes. xray shows marked degenerative osteo arthritic changes involve the medialfemoral tibial joint compartment with medial subluxation of the femur. She has pain limited strength        Subjective:  Moriah Bales arrives to therapy stating that the knee is getting a little better, not quite as painful. She is doing the exercises at home faithfully and feels like she would like for today to be her last session. She understands that we can't change the arthritis in her knee and doesn't want to keep paying for therapy knowing she can do a lot of the exercises at home. Still has pain with stairs but walking is less painful.  She still has difficulty with prolonged walking and standing, can only manage these activities for about 10' before pain increases and she has to sit down and rest. Can go up and down stairs but it is very painful, uses one step at a time and uses hand rail for support. Any changes in Ambulatory Summary Sheet? None        Objective:  COVID screening questions were asked and patient attested that there had been no contact or symptoms    AROM  Flexion 145° no pain  Extension lacking 7° - very painful right behind the knee     MMT   Quads 4-/5 pain  Hams 4-/5 pain       Exercises: (No more than 4 columns)   Exercise/Equipment 9/29/2021 #2 10/1/2021 #3 10/5/2021 #4           WARM UP      Nu step S6/A7/L3 5' modified range secondary to pain 5' --         TABLE      DKTC RSB 15* RSB 15* RSB 15*   Hip gluteal stretching -- -- --   SLR 2*10 2*10 2*10   bridge 2*10 2*10 2*10   HS stretch  -- -- --   Calf stretch with green strap  3*20\" 2*30\"     LAQ  2*10 2*10   Fitter LP   2 blue cords 2*10                   STANDING      Heel raise 2*10  2*10   Anterior step up  4\" 10* -- --   Hip abduction B    15* ea   Hip adduction B    15* ea                    PROPRIOCEPTION                                    MODALITIES                            Other Therapeutic Activities/Education:  None       Home Exercise Program:  Modified piriformis stretch, knee to opp hip stretch, supine double knee to chest.        Manual Treatments:       Modalities:  None       Communication with other providers:  None       Assessment: Iman Crowder has been seen in PT for 4 sessions for treatment of L knee OA. Treatment has focused on gentle ROM and strengthening within patients pain tolerance. Her ROM and strength remain limited, mostly as a result of pain. She lacks good tolerance for exercise and manual interventions making it difficult for her to progress in PT. She was encouraged to continue working on her HEP in order to maintain ROM and strength.   End session pain: same     Plan for Next Session: D/C      Time In / Time Out:  1300/1330        Timed Code/Total Treatment Minutes: 27' 2 TE       Next Progress Note due:        Plan of Care Interventions:  [x] Therapeutic Exercise  [] Modalities:  [x] Therapeutic Activity     [] Ultrasound  [] Estim  [] Gait Training      [] Cervical Traction [] Lumbar Traction  [x] Neuromuscular Re-education    [] Cold/hotpack [] Iontophoresis   [x] Instruction in HEP      [] Vasopneumatic   [] Dry Needling    [x] Manual Therapy               [] Aquatic Therapy              Electronically signed by:  Karen Chowdary     10/5/2021, 9:18 AM

## 2021-12-17 ENCOUNTER — OFFICE VISIT (OUTPATIENT)
Dept: ORTHOPEDIC SURGERY | Age: 71
End: 2021-12-17
Payer: MEDICARE

## 2021-12-17 VITALS
WEIGHT: 148 LBS | RESPIRATION RATE: 19 BRPM | HEIGHT: 64 IN | HEART RATE: 70 BPM | BODY MASS INDEX: 25.27 KG/M2 | OXYGEN SATURATION: 99 %

## 2021-12-17 DIAGNOSIS — M17.12 ARTHRITIS OF KNEE, LEFT: Primary | ICD-10-CM

## 2021-12-17 PROCEDURE — 99212 OFFICE O/P EST SF 10 MIN: CPT | Performed by: PHYSICIAN ASSISTANT

## 2021-12-17 PROCEDURE — 20610 DRAIN/INJ JOINT/BURSA W/O US: CPT | Performed by: PHYSICIAN ASSISTANT

## 2021-12-17 RX ORDER — ATORVASTATIN CALCIUM 40 MG/1
TABLET, FILM COATED ORAL
COMMUNITY
Start: 2021-11-15

## 2021-12-17 ASSESSMENT — ENCOUNTER SYMPTOMS
EYES NEGATIVE: 1
RESPIRATORY NEGATIVE: 1
GASTROINTESTINAL NEGATIVE: 1

## 2021-12-17 NOTE — PROGRESS NOTES
Review of Systems   Constitutional: Negative. HENT: Negative. Eyes: Negative. Respiratory: Negative. Cardiovascular: Negative. Gastrointestinal: Negative. Genitourinary: Negative. Musculoskeletal: Positive for arthralgias and joint swelling. Skin: Negative. Negative for rash and wound. Neurological: Negative. Psychiatric/Behavioral: Negative. HPI:  Gianluca Rosen is a 66 y.o. female that returns to the office today with a left knee pain and requesting an injection. Pt states she has had a series of injections with relief. Pt states she comes in every couple of months with knee pain and has significant relief after getting an injection. Pt states her pain is localized on the back of her knee and the medial aspect of her knee. Pt states she will feel significantly stiff after sitting for long periods or laying in bed and takes her a couple minutes to get up and moving. Pt has wore a brace occasionally with some relief. Pt denies any new injuries. Past Medical History:   Diagnosis Date    Hyperlipidemia     Hypertension     Thyroid disease        Past Surgical History:   Procedure Laterality Date    CHOLECYSTECTOMY         No family history on file.     Social History     Socioeconomic History    Marital status: Single     Spouse name: None    Number of children: None    Years of education: None    Highest education level: None   Occupational History    None   Tobacco Use    Smoking status: Never Smoker    Smokeless tobacco: Never Used   Substance and Sexual Activity    Alcohol use: Yes     Comment: rarely    Drug use: No    Sexual activity: None   Other Topics Concern    None   Social History Narrative    None     Social Determinants of Health     Financial Resource Strain:     Difficulty of Paying Living Expenses: Not on file   Food Insecurity:     Worried About Running Out of Food in the Last Year: Not on file    Adelia of Food in the Last Year: Not on file   Transportation Needs:     Lack of Transportation (Medical): Not on file    Lack of Transportation (Non-Medical): Not on file   Physical Activity:     Days of Exercise per Week: Not on file    Minutes of Exercise per Session: Not on file   Stress:     Feeling of Stress : Not on file   Social Connections:     Frequency of Communication with Friends and Family: Not on file    Frequency of Social Gatherings with Friends and Family: Not on file    Attends Rastafarian Services: Not on file    Active Member of [address] [place] [address] [employer] or Organizations: Not on file    Attends Club or Organization Meetings: Not on file    Marital Status: Not on file   Intimate Partner Violence:     Fear of Current or Ex-Partner: Not on file    Emotionally Abused: Not on file    Physically Abused: Not on file    Sexually Abused: Not on file   Housing Stability:     Unable to Pay for Housing in the Last Year: Not on file    Number of [place] in the Last Year: Not on file    Unstable Housing in the Last Year: Not on file       Current Outpatient Medications   Medication Sig Dispense Refill    atorvastatin (LIPITOR) 40 MG tablet       amLODIPine (NORVASC) 5 MG tablet TAKE 1 TABLET BY MOUTH ONCE DAILY      naproxen (NAPROSYN) 500 MG tablet Take 500 mg by mouth as needed for Pain      levothyroxine (SYNTHROID) 88 MCG tablet Take 88 mcg by mouth Daily. No current facility-administered medications for this visit. No Known Allergies    Review of Systems:  See above      Physical Exam:   Pulse 70   Resp 19   Ht 5' 4\" (1.626 m)   Wt 148 lb (67.1 kg)   SpO2 99%   BMI 25.40 kg/m²        Gait is Normal. The patient can bear weight on the injured extremity. Gen/Psych:Examination reveals a pleasant individual in no acute distress. The patient is oriented to time, place and person. The patient's mood and affect are appropriate. Patient appears well nourished.  Body habitus is normal     Lymph:  no lymphedema in bilateral lower extremities     Skin intact in bilateral lower extremities with no ulcerations, lesions, rash, erythema. Vascular: There are no varicosities in bilateral lower extremities, sensation intact to light touch over bilateral lower extremities. left leg/knee exam:  Leg alignment:     neutral  Quadriceps/hamstring atrophy:   no  Knee effusion:    no   Knee erythema:   no  ROM:     Full, 0-120 degrees  Varus laxity at 0 and 30 deg's: no  Valguslaxity at 0 and 30 deg's: no  Recurvatum:    no  Tenderness at:         Bilateral Knee strength is 5/5 flexion and extension  There is + L2-S1 motor and sensory function in bilateral lower extremities    Outside record review: office notes and x-rays reviewed        Impression:     Diagnosis Orders   1. Arthritis of knee, left             Plan:    Patient Instructions   Steroid injection given today into the left knee. Continue range of motion exercises as instructed. Ice and elevate as needed. Tylenol or Motrin for pain. Follow up as needed. Left knee injection procedure note    Pre-procedure diagnosis:  Left knee DJD    Post-procedure diagnosis:  same    Procedure: The planned procedure/risks/benefits/alternatives were discussed with the patient. Risks include, but are not limited to, increased pain, drug reaction, infection, bleeding, lack of improvement, neurovascular injury, and increased blood sugar levels. The patient understood and all of their questions were answered. The Left knee was prepped with alcohol then a 22 gauge needle was advanced into the inferior-lateral joint without difficulty. The joint was then injected with 1 ml 1% lidocaine, 1ml of Kenalog (40 mg/ml), 1ml 0.5% bupivacaine the injection site was cleansed with isopropyl alcohol and a band-aid was placed. Complications:  None, the patient tolerated the procedure well. Instructions: The patient was advised to rest the knee and decrease activity for the next 24 to 48 hours. May use prescription or OTC pain relievers as needed for any post-injection pain as well as ice.

## 2021-12-17 NOTE — PATIENT INSTRUCTIONS
Steroid injection given today into the left knee. Continue range of motion exercises as instructed. Ice and elevate as needed. Tylenol or Motrin for pain. Follow up as needed.

## 2022-03-28 ENCOUNTER — OFFICE VISIT (OUTPATIENT)
Dept: ORTHOPEDIC SURGERY | Age: 72
End: 2022-03-28
Payer: COMMERCIAL

## 2022-03-28 VITALS
WEIGHT: 148 LBS | OXYGEN SATURATION: 98 % | BODY MASS INDEX: 25.27 KG/M2 | HEIGHT: 64 IN | HEART RATE: 101 BPM | RESPIRATION RATE: 16 BRPM

## 2022-03-28 DIAGNOSIS — M17.12 PRIMARY OSTEOARTHRITIS OF LEFT KNEE: Primary | ICD-10-CM

## 2022-03-28 PROCEDURE — 20610 DRAIN/INJ JOINT/BURSA W/O US: CPT | Performed by: PHYSICIAN ASSISTANT

## 2022-03-28 NOTE — PROGRESS NOTES
Review of Systems   Constitutional: Negative. HENT: Negative. Eyes: Negative. Respiratory: Negative. Cardiovascular: Negative. Gastrointestinal: Negative. Genitourinary: Negative. Musculoskeletal: Positive for arthralgias, joint swelling and myalgias. Skin: Negative. Negative for rash and wound. Neurological: Negative. Psychiatric/Behavioral: Negative. HPI:  Keli Lion is a 74 y.o. female that presents the office today with recurrent left knee pain. She was last seen in this office December 17, 2021 and received a steroid injection in the left knee. She states that that steroid injection helped up until about 3 weeks ago. She states that her knee pain is aching in nature and today is about a 5/10, worse with weightbearing. Past Medical History:   Diagnosis Date    Hyperlipidemia     Hypertension     Thyroid disease        Past Surgical History:   Procedure Laterality Date    CHOLECYSTECTOMY         No family history on file. Social History     Socioeconomic History    Marital status: Single     Spouse name: None    Number of children: None    Years of education: None    Highest education level: None   Occupational History    None   Tobacco Use    Smoking status: Never Smoker    Smokeless tobacco: Never Used   Substance and Sexual Activity    Alcohol use: Yes     Comment: rarely    Drug use: No    Sexual activity: None   Other Topics Concern    None   Social History Narrative    None     Social Determinants of Health     Financial Resource Strain:     Difficulty of Paying Living Expenses: Not on file   Food Insecurity:     Worried About Running Out of Food in the Last Year: Not on file    [place] of Food in the Last Year: Not on file   Transportation Needs:     Lack of Transportation (Medical): Not on file    Lack of Transportation (Non-Medical):  Not on file   Physical Activity:     Days of Exercise per Week: Not on file    Minutes of Exercise per Session: Not on file   Stress:     Feeling of Stress : Not on file   Social Connections:     Frequency of Communication with Friends and Family: Not on file    Frequency of Social Gatherings with Friends and Family: Not on file    Attends Sabianism Services: Not on file    Active Member of [address] [place] [address] or Organizations: Not on file    Attends Club or Organization Meetings: Not on file    Marital Status: Not on file   Intimate Partner Violence:     Fear of Current or Ex-Partner: Not on file    Emotionally Abused: Not on file    Physically Abused: Not on file    Sexually Abused: Not on file   Housing Stability:     Unable to Pay for Housing in the Last Year: Not on file    Number of [place] in the Last Year: Not on file    Unstable Housing in the Last Year: Not on file       Current Outpatient Medications   Medication Sig Dispense Refill    atorvastatin (LIPITOR) 40 MG tablet       amLODIPine (NORVASC) 5 MG tablet TAKE 1 TABLET BY MOUTH ONCE DAILY      naproxen (NAPROSYN) 500 MG tablet Take 500 mg by mouth as needed for Pain      levothyroxine (SYNTHROID) 88 MCG tablet Take 88 mcg by mouth Daily. No current facility-administered medications for this visit. No Known Allergies    Review of Systems:  See above      Physical Exam:   Pulse 101   Resp 16   Ht 5' 4\" (1.626 m)   Wt 148 lb (67.1 kg)   SpO2 98%   BMI 25.40 kg/m²        Gait is Normal. The patient can bear weight on the injured extremity. Gen/Psych:Examination reveals a pleasant individual in no acute distress. The patient is oriented to time, place and person. The patient's mood and affect are appropriate. Patient appears well nourished. Body habitus is normal     Lymph:  no lymphedema in bilateral lower extremities     Skin intact in bilateral lower extremities with no ulcerations, lesions, rash, erythema. Vascular:  There are mild varicosities in bilateral lower extremities, sensation intact to light touch over bilateral lower extremities. left leg/knee exam:  Leg alignment:     neutral  Quadriceps/hamstring atrophy:   no  Knee effusion:    no   Knee erythema:   no  ROM:     0-125 degrees  Varus laxity at 0 and 30 deg's: no  Valguslaxity at 0 and 30 deg's: no  Recurvatum:    no  Tenderness at:   Medial joint line    Bilateral Knee strength is 5/5 flexion and extension  There is + L2-S1 motor and sensory function in bilateral lower extremities    Outside record review: office notes and x-rays reviewed    X-ray of the left knee reviewed from November 2020 showed moderate degenerative change primarily affecting the medial compartment of the left knee. Impression:     Diagnosis Orders   1. Primary osteoarthritis of left knee  [place] ARTHROCENTESIS ASPIR&/INJ MAJOR JT/BURSA W/O US           Plan:    Patient Instructions   Continue to weight bear as tolerated  Continue range of motion   Ice and elevate as needed  Tylenol or motrin for pain   Injection given today in office   Rest for 24-48 hours  Follow up as needed    Left knee injection procedure note    Pre-procedure diagnosis:  Left knee DJD    Post-procedure diagnosis:  same    Procedure: The planned procedure/risks/benefits/alternatives were discussed with the patient. Risks include, but are not limited to, increased pain, drug reaction, infection, bleeding, lack of improvement, neurovascular injury, and increased blood sugar levels. The patient understood and all of their questions were answered. The Left knee was prepped with alcohol then a 25 gauge needle was advanced into the inferior-lateral joint without difficulty. The joint was then injected with 1 ml 1% lidocaine, 1ml of Kenalog (40 mg/ml), 1ml 0.5% bupivacaine the injection site was cleansed with isopropyl alcohol and a band-aid was placed. Complications:  None, the patient tolerated the procedure well. Instructions:   The patient was advised to rest the knee and decrease activity for the next 24 to 48 hours. May use prescription or OTC pain relievers as needed for any post-injection pain as well as ice.

## 2022-04-07 ASSESSMENT — ENCOUNTER SYMPTOMS
GASTROINTESTINAL NEGATIVE: 1
EYES NEGATIVE: 1
RESPIRATORY NEGATIVE: 1

## 2022-06-15 ENCOUNTER — OFFICE VISIT (OUTPATIENT)
Dept: ORTHOPEDIC SURGERY | Age: 72
End: 2022-06-15
Payer: MEDICARE

## 2022-06-15 VITALS
RESPIRATION RATE: 16 BRPM | DIASTOLIC BLOOD PRESSURE: 81 MMHG | OXYGEN SATURATION: 95 % | WEIGHT: 151.4 LBS | HEART RATE: 87 BPM | SYSTOLIC BLOOD PRESSURE: 135 MMHG | HEIGHT: 64 IN | BODY MASS INDEX: 25.85 KG/M2

## 2022-06-15 DIAGNOSIS — M17.12 PRIMARY OSTEOARTHRITIS OF LEFT KNEE: ICD-10-CM

## 2022-06-15 PROCEDURE — 99212 OFFICE O/P EST SF 10 MIN: CPT | Performed by: PHYSICIAN ASSISTANT

## 2022-06-15 PROCEDURE — 1123F ACP DISCUSS/DSCN MKR DOCD: CPT | Performed by: PHYSICIAN ASSISTANT

## 2022-06-15 PROCEDURE — 20610 DRAIN/INJ JOINT/BURSA W/O US: CPT | Performed by: PHYSICIAN ASSISTANT

## 2022-06-15 ASSESSMENT — ENCOUNTER SYMPTOMS
EYES NEGATIVE: 1
GASTROINTESTINAL NEGATIVE: 1
RESPIRATORY NEGATIVE: 1

## 2022-06-15 NOTE — PROGRESS NOTES
Patient is in the office today to discuss another steroid injection into the left knee. Patient last one was on 03/28/2022. Patient states that she is still having pain around the knee. Patient is wanting another injection.

## 2022-06-15 NOTE — PROGRESS NOTES
Review of Systems   Constitutional: Negative. HENT: Negative. Eyes: Negative. Respiratory: Negative. Cardiovascular: Negative. Gastrointestinal: Negative. Genitourinary: Negative. Musculoskeletal: Positive for arthralgias, joint swelling and myalgias. Skin: Negative. Negative for rash and wound. Neurological: Negative. Psychiatric/Behavioral: Negative. HPI:  Josue Sahni is a 74 y.o. female that returns the office today with recurrent left knee pain. She does have significant severe arthritis in both knees but does not want a knee replacement. She would like a steroid injection today. Her last injection was 3 months ago. Past Medical History:   Diagnosis Date    Hyperlipidemia     Hypertension     Thyroid disease        Past Surgical History:   Procedure Laterality Date    CHOLECYSTECTOMY         No family history on file. Social History     Socioeconomic History    Marital status: Single     Spouse name: None    Number of children: None    Years of education: None    Highest education level: None   Occupational History    None   Tobacco Use    Smoking status: Never Smoker    Smokeless tobacco: Never Used   Substance and Sexual Activity    Alcohol use: Yes     Comment: rarely    Drug use: No    Sexual activity: None   Other Topics Concern    None   Social History Narrative    None     Social Determinants of Health     Financial Resource Strain:     Difficulty of Paying Living Expenses: Not on file   Food Insecurity:     Worried About Running Out of Food in the Last Year: Not on file    Adelia of Food in the Last Year: Not on file   Transportation Needs:     Lack of Transportation (Medical): Not on file    Lack of Transportation (Non-Medical):  Not on file   Physical Activity:     Days of Exercise per Week: Not on file    Minutes of Exercise per Session: Not on file   Stress:     Feeling of Stress : Not on file   Social Connections:     Frequency of Communication with Friends and Family: Not on file    Frequency of Social Gatherings with Friends and Family: Not on file    Attends Caodaism Services: Not on file    Active Member of Clubs or Organizations: Not on file    Attends Club or Organization Meetings: Not on file    Marital Status: Not on file   Intimate Partner Violence:     Fear of Current or Ex-Partner: Not on file    Emotionally Abused: Not on file    Physically Abused: Not on file    Sexually Abused: Not on file   Housing Stability:     Unable to Pay for Housing in the Last Year: Not on file    Number of [place] in the Last Year: Not on file    Unstable Housing in the Last Year: Not on file       Current Outpatient Medications   Medication Sig Dispense Refill    atorvastatin (LIPITOR) 40 MG tablet       amLODIPine (NORVASC) 5 MG tablet TAKE 1 TABLET BY MOUTH ONCE DAILY      naproxen (NAPROSYN) 500 MG tablet Take 500 mg by mouth as needed for Pain      levothyroxine (SYNTHROID) 88 MCG tablet Take 88 mcg by mouth Daily. No current facility-administered medications for this visit. No Known Allergies    Review of Systems:  See above      Physical Exam:   /81 (Site: Left Upper Arm, Position: Sitting, Cuff Size: Medium Adult)   Pulse 87   Resp 16   Ht 5' 4\" (1.626 m)   Wt 151 lb 6.4 oz (68.7 kg)   SpO2 95%   BMI 25.99 kg/m²        Gait is Normal. The patient can bear weight on the injured extremity. Gen/Psych:Examination reveals a pleasant individual in no acute distress. The patient is oriented to time, place and person. The patient's mood and affect are appropriate. Patient appears well nourished. Body habitus is normal     Lymph:  no lymphedema in bilateral lower extremities     Skin intact in bilateral lower extremities with no ulcerations, lesions, rash, erythema. Vascular:  There are mild varicosities in bilateral lower extremities, sensation intact to light touch over bilateral lower extremities. left leg/knee exam:  Leg alignment:     neutral  Quadriceps/hamstring atrophy:   no  Knee effusion:    no   Knee erythema:   no  ROM:     0-125 degrees  Varus laxity at 0 and 30 deg's: no  Valguslaxity at 0 and 30 deg's: no  Recurvatum:    no  Tenderness at:   Medial joint line    Bilateral Knee strength is 5/5 flexion and extension  There is + L2-S1 motor and sensory function in bilateral lower extremities    Outside record review: office notes and x-rays reviewed    X-ray of the left knee reviewed from November 2020 showed moderate degenerative change primarily affecting the medial compartment of the left knee. Impression:     Diagnosis Orders   1. Primary osteoarthritis of left knee             Plan:    Patient Instructions   Follow-up in September for injection prior to your trip. Motion as tolerated. Left knee injection procedure note    Pre-procedure diagnosis:  Left knee DJD    Post-procedure diagnosis:  same    Procedure: The planned procedure/risks/benefits/alternatives were discussed with the patient. Risks include, but are not limited to, increased pain, drug reaction, infection, bleeding, lack of improvement, neurovascular injury, and increased blood sugar levels. The patient understood and all of their questions were answered. The Left knee was prepped with alcohol then a 22 gauge needle was advanced into the inferior-lateral joint without difficulty. The joint was then injected with 1 ml 1% lidocaine, 1ml of Kenalog (40 mg/ml), 1ml 0.5% bupivacaine the injection site was cleansed with isopropyl alcohol and a band-aid was placed. Complications:  None, the patient tolerated the procedure well. Instructions: The patient was advised to rest the knee and decrease activity for the next 24 to 48 hours. May use prescription or OTC pain relievers as needed for any post-injection pain as well as ice.

## 2022-07-22 ENCOUNTER — HOSPITAL ENCOUNTER (OUTPATIENT)
Dept: PHYSICAL THERAPY | Age: 72
Setting detail: THERAPIES SERIES
Discharge: HOME | End: 2022-07-22
Payer: MEDICARE

## 2022-07-22 ENCOUNTER — OFFICE VISIT (OUTPATIENT)
Dept: NEUROLOGY | Age: 72
End: 2022-07-22
Payer: MEDICARE

## 2022-07-22 VITALS
OXYGEN SATURATION: 97 % | SYSTOLIC BLOOD PRESSURE: 118 MMHG | BODY MASS INDEX: 25.16 KG/M2 | HEIGHT: 64 IN | WEIGHT: 147.4 LBS | HEART RATE: 83 BPM | DIASTOLIC BLOOD PRESSURE: 72 MMHG

## 2022-07-22 DIAGNOSIS — R41.3 MEMORY IMPAIRMENT: ICD-10-CM

## 2022-07-22 DIAGNOSIS — E55.9 VITAMIN D DEFICIENCY: ICD-10-CM

## 2022-07-22 DIAGNOSIS — G25.0 BENIGN ESSENTIAL TREMOR: ICD-10-CM

## 2022-07-22 DIAGNOSIS — R26.81 GAIT INSTABILITY: Primary | ICD-10-CM

## 2022-07-22 LAB
FOLATE: >20 NG/ML (ref 4.78–24.2)
TSH SERPL DL<=0.05 MIU/L-ACNC: 0.62 UIU/ML (ref 0.27–4.2)
VITAMIN B-12: 396 PG/ML (ref 211–911)
VITAMIN D 25-HYDROXY: 43 NG/ML

## 2022-07-22 PROCEDURE — 97161 PT EVAL LOW COMPLEX 20 MIN: CPT

## 2022-07-22 PROCEDURE — 99205 OFFICE O/P NEW HI 60 MIN: CPT | Performed by: STUDENT IN AN ORGANIZED HEALTH CARE EDUCATION/TRAINING PROGRAM

## 2022-07-22 PROCEDURE — 1123F ACP DISCUSS/DSCN MKR DOCD: CPT | Performed by: STUDENT IN AN ORGANIZED HEALTH CARE EDUCATION/TRAINING PROGRAM

## 2022-07-22 PROCEDURE — 36415 COLL VENOUS BLD VENIPUNCTURE: CPT | Performed by: STUDENT IN AN ORGANIZED HEALTH CARE EDUCATION/TRAINING PROGRAM

## 2022-07-22 PROCEDURE — 97110 THERAPEUTIC EXERCISES: CPT

## 2022-07-22 RX ORDER — HYDROGEN PEROXIDE 2.65 ML/100ML
LIQUID ORAL; TOPICAL
COMMUNITY
Start: 2022-07-18

## 2022-07-22 NOTE — PLAN OF CARE
Outpatient Physical Therapy           [place]           [] Phone: [phone]   Fax: [phone]  [facility]           [] Phone: [phone]   Fax: [phone]     To: Michelle Morfin MD     From: Mikaela Friedman, PT, DPT ,OCS    Patient: Michelle Mascorro       : 1950  Diagnosis: Other amnesia [R41.3]  Other abnormalities of gait and mobility [R26.89]    Treatment Diagnosis: reduced balance stability, deconditioning, LE weakness  Date: 2022    Physical Therapy Certification/Re-Certification Form  Dear Dr. Lavonne Fritz,   The following patient has been evaluated for physical therapy services and for therapy to continue, insurance requires physician review of the treatment plan initially and every 90 days. Please review the attached evaluation and/or summary of the patient's plan of care, and verify that you agree therapy should continue by signing the attached document and sending it back to our office. Assessment:     Pt is 74year old female with 1 month sudden onset speech difficulties and being unstable. Ruled out stroke at ED. Pt now has difficulties completing prolonged mobility and general mobility without balance instability without falls at this time. Pt demo deficits this date that include B LE closed chain strength, poor proprioception, gait tolerance, reduced righting reaction and calf pain. Pt will benefit with PT services with progression of strength/ROM and neuro re-ed to return to PLOF. Pt prior to onset of current condition had no pain and instability with able to complete full ADLs. Patient received education on their current pathology and how their condition effects them with their functional activities. Patient understood discussion and questions were answered. Patient understands their activity limitations and understands rational for treatment progression.      Plan of Care/Treatment to date:  [x] Therapeutic Exercise  [x] Modalities:  [x] Therapeutic Activity     [] Ultrasound  [] Electrical Stimulation  [x] Gait Training      [] Cervical Traction [] Lumbar Traction  [x] Neuromuscular Re-education    [x] Cold/hotpack [] Iontophoresis   [x] Instruction in HEP      [] Vasopneumatic    [] Dry Needling  [x] Manual Therapy               [] Aquatic Therapy       Other:          Frequency/Duration:  # Days per week: [] 1 day # Weeks: [] 1 week [] 5 weeks     [x] 2 days   [] 2 weeks [x] 6 weeks     [] 3 days   [] 3 weeks [] 7 weeks     [] 4 days   [] 4 weeks [] 8 weeks         [] 9 weeks [] 10 weeks         [] 11 weeks [] 12 weeks    Rehab Potential/Progress: [] Excellent [x] Good [] Fair  [] Poor     Goals:    Patient goals: improve balance stability  Short term goals  Time Frame for Short term goals: Defer to [address] Term Goals  Time Frame for Long Term Goals: 6 weeks 9/7/22  Pt will demo I with HEP/symptom management. Pt will demo >30 sec with tandem stance to demo improved balance recovery. Pt will demo eyes closed with feet apart and together >20 sec to demo improved proprioception with low light situations at home to decrease fall risk. Pt will demo  >1100ft with 6MWT with <5/10 Mod DWAYNE and <2/10 B calf pain to ease community mobility. Pt will demo 5x sit to stand <20 sec to demo improved LE strength to ease transfers. Electronically signed by:  Suni Hinds, PT, DPT, OCS 7/22/2022, 12:23 PM    7/22/2022 12:23 PM       If you have any questions or concerns, please don't hesitate to call.   Thank you for your referral.      Physician Signature:________________________________Date:_________ TIME: _____  By signing above, therapists plan is approved by physician

## 2022-07-22 NOTE — PROGRESS NOTES
7/22/22    [facility]  1950    Chief Complaint   Patient presents with    Cerebrovascular Accident     Pt presents per PCP, possible CVA, pt states she was told it was her bp causing her symptoms of CVA. Patient also displays tremors in head and bilat hand    Memory Loss     Pt presents for memory issues, pt struggled with simple directions to exam room and onto scale to be weighed, pt states she does not really know or remember which is worse but she struggles with short term and long term, pt struggles with remembering which meds to take    Gait Problem     Pt presents for gait issues, pt struggled with balance when walking into the exam room       Neurologic Consult Note    Subjective    History of Present Illness  Cece Coppola is a 74 y.o. female presenting today for neurologic evaluation of possible recent stroke. She was [facility] [place] [place]/2022 for expressive dysphasia. In chart review, it appears that she was complaining of headache for most of the week preceding this. While at a graduation party, family's/friends noted that she was not acting her normal self. She was not talking much. When asked about this, she reported that again she had had headache and was dizzy. She does report having history of chronic dizziness for which she is maintained on Antivert. She was having trouble getting words out and appeared very frustrated to her friends. She was taken to the ED for evaluation. In the emergency department at [facility] ED, CT head and CTA head and neck were nonacute. She was subsequently then admitted to [facility] for further work-up. She refused MRI. .  2D echo without stroke precipitants. She was not evaluated by neurology. It is uncertain what came of the remainder of her work-up as there is no discharge summary in place yet.     On today's visit, Cece Coppola did complete an outpatient MRI of the brain without contrast on 7/14/22 that showed no acute intracranial abnormality, moderate to advanced chronic small vessel disease. The findings portion of the report also states that there was extensive patchy T2/FLAIR signal hyperintensities in the cerebral white matter and piyush. I do not have the images to review personally today. She tells me today that she was at a graduation party on 6/18/22 and started to \"feel bad\" so she told her daughter she was going to go home. Her daughter noticed that she was slurring her speech and the squad was called. Today she adamantly denies having headaches during that episode. She states she feels off balance and shaky all the time since that episode. She denies any falls. She tells me that she has had a stressful past. She has 2 children that will not talk to her. Her mother took her own life in the 66's. She admits to having a lot of anxiety and appears very anxious. Review of Symptoms:  Neurologic   Symptoms: memory loss, sensory disturbances, difficulty with gait or walking, no difficulty with gait or walking, no bowel symptoms, no vertigo, no confusion, no speech disorder, no visual loss, no double vision, no dizziness, no loss of hearing, no weakness, no headaches, no bladder symptoms, no seizures, no excessive fatigue, and no syncope    Current Outpatient Medications   Medication Sig Dispense Refill    EQ ASPIRIN ADULT LOW DOSE 81 MG EC tablet TAKE 1 TABLET BY MOUTH ONCE DAILY      sertraline (ZOLOFT) 50 MG tablet       atorvastatin (LIPITOR) 40 MG tablet       amLODIPine (NORVASC) 5 MG tablet TAKE 1 TABLET BY MOUTH ONCE DAILY      levothyroxine (SYNTHROID) 88 MCG tablet Take 88 mcg by mouth Daily. naproxen (NAPROSYN) 500 MG tablet Take 500 mg by mouth as needed for Pain (Patient not taking: Reported on 7/22/2022)       No current facility-administered medications for this visit.        Past Medical History:   Diagnosis Date    Hyperlipidemia     Hypertension     Thyroid disease        Past Surgical History: Procedure Laterality Date    CHOLECYSTECTOMY          Social History     Socioeconomic History    Marital status: Single   Tobacco Use    Smoking status: Never    Smokeless tobacco: Never   Substance and Sexual Activity    Alcohol use: Yes     Comment: rarely    Drug use: No       No family history on file. Objective    Physical Exam:  Also present during visit: Petra Mcpherson is alone.     Constitutional   Weight: well nourished, properly dressed, adequate hygiene but interestingly patchy shaved head  Heart/Vascular   No cyanosis or clubbing appreciated  Neck   Appearance/Palpation/Auscultation: supple  Mental Status   Orientation: oriented to person, oriented to place, oriented to problem, and oriented to time   Mood/Affect: appropriate mood and anxious   Memory/Other: recent memory intact, remote memory intact, fund of knowledge intact, attention span normal, and concentration normal  Language   Language: (normal) language, no dysarthria, (normal) articulation, and no dysphasia/aphasia  Cranial Nerves   CN II Right: visual fields appear intact   CN II Left: visual fields appear intact   CN III, IV, VI: EOM no nystagmus, normal pursuit, and extraocular muscle strength normal   CN III: pupil normal size, pupil reactive to light and dark, pupil accomodates, and no ptosis   CN IV: normal   CN VI: normal   CN V Right: normal sensation and muscles of mastication intact   CN V Left: normal sensation and muscles of mastication intact   CN VII Right: normal facial expression   CN VII Left: normal facial expression   CN VIII Right: hearing in tact to normal conversation   CN VIII Left: hearing in tact to normal conversation   CN IX,X: normal palatal movement   CN XI Right: normal sternocleidomastoid and normal trapezius   CN XI Left: normal sternocleidomastoid and normal trapezius   CN XII: no tremors of the tongue, no fasciculation of the tongue, tongue protrudes midline, normal power to left, and normal power to right  Gait and Stance   Gait/Posture: station normal, ambulates independently, and gait normal  Motor/Coordination Exam   General: no bradykinesia, no chorea, no athetosis, no myoclonus, and no dyskinesia left hand with high frequency low amplitude action tremor   Right Upper Extremity: normal motor strength, normal bulk, and normal tone   Left Upper Extremity: normal motor strength, normal bulk, and normal tone   Right Lower Extremity: normal motor strength, normal bulk, and normal tone   Left Lower Extremity: normal motor strength, normal bulk, and normal tone   Coordination: no drift, normal finger-to-nose, normal heel-to-shin, and rapid alternating movements normal moderate sway with Romberg. Reflexes   Reflexes Right: 2/4 biceps, brachioradialis, 1/4 patellar, and achilles    Reflexes Left: 2/4 biceps, brachioradialis, 1/4 patellar, and achilles    Plantar Reflex Right: response downgoing   Plantar Reflex Left: response downgoing   Hoffmans Reflex Right: absent   Hoffmans Reflex Left: absent   Frontal Release Signs: frontal release signs absent  Sensory   Sensation: normal light touch, decreased pinprick length dependent, decreased temperature, normal vibration, difficulties consistently answering questions when testing proprioception. normal DSS, and no neglect    Lungs   No auditory wheezing  Skin   Inspection: no jaundice, no lesions, no rashes, and no cyanosis      /72 (Site: Left Upper Arm, Position: Sitting, Cuff Size: Large Adult)   Pulse 83   Ht 5' 4\" (1.626 m)   Wt 147 lb 6.4 oz (66.9 kg)   SpO2 97%   BMI 25.30 kg/m²     Assessment and Plan     Diagnosis Orders   1. Gait instability  Nerve Conduction Test with EMG    Vitamin B12 & Folate    TSH      2. Memory impairment  Nerve Conduction Test with EMG    Vitamin B12 & Folate    TSH      3. Benign essential tremor        4.  Vitamin D deficiency  Vitamin D 25 Hydroxy          Beverly May was seen in neurological consultation for possible stroke, balance issues and memory impairment. I am glad that Juan C Dela Cruz completed her MRI of the brain on 7/14/22 at [facility]. It did not show an acute intracranial process however I have requested the images to personally review it since it was a month after the acute incident and showed extensive patchy hyperintensities in the white matter and piyush, which could be suggestive of microvascular disease, but could also represent something more rare such as ADEM. If the latter is favored after my personal review, it may warrant more workup such as lumbar puncture. While Juan C Dela Cruz does have a nonlateralizing examination today, she has profound delay in reaction time and appears very anxious throughout my examination. It was noted that there is concerns with memory loss however Juan C Dela Cruz does not think she has anything wrong with her memory. I discussed with Juan C Dela Cruz obtaining an in-office memory test to evaluate for psychiatric vs. organic causes of memory loss,however, Juan C Dela Cruz states she wants to think about this and will let us know as again she does not believe there is anything wrong with her memory. Regarding her balance issues, she feels shaky and unsteady on her feet. On examination, she had inconsistent findings due to delay and inability to readily participate with objective sensory testing. Therefore, I would like to evaluate this further with an EMG to look for peripheral neuropathy. I will also obtain labs today. In regards to Elvia's essential tremor, it is not bothersome to her. No new medications were started today however we can reconsider this at future visits if it starts to effect her daily living. Do feel it is reasonable for Juan C Dela Cruz to continue aspirin and statin for secondary stroke prevention. I strongly encouraged Juan C Dela Cruz to follow-up with her primary care provider and/or psychiatry regarding underlying anxiety/depression as this can certainly manifest in neurologic ways.   She verbalized understanding. The patient was given time to ask questions, all of which were answered to the best of my abilities. Thank you for allowing me to participate in the care of your patient. Please do not hesitate to contact our office with questions. I spent >60 minutes total time regarding this patient's encounter. This included obtaining history, performing a neurological medical exam, developing an assessment / plan, and documenting via EMR. Return in about 3 months (around 10/22/2022) for follow up with CAROLINE.     Anastasia Nelson, DO

## 2022-07-22 NOTE — FLOWSHEET NOTE
Outpatient Physical Therapy  [place]           [x] Phone: [phone]   Fax: [phone]  [facility]           [] Phone: [phone]   Fax: [phone]        Physical Therapy Daily Treatment Note  Date:  2022    Patient Name:  Mary Avila    :  1950  MRN: [id number]  Restrictions/Precautions: No data recorded      Diagnosis:   Other amnesia [R41.3]  Other abnormalities of gait and mobility [R26.89]    Date of Injury/Surgery:   Treatment Diagnosis:  reduced balance stability, deconditioning, LE weakness  Insurance/Certification information:  Chelsea Hutchins   Referring Physician: Micaela Almanza MD     PCP: Lemuel Sauceda MD  Next Doctor Visit:    Plan of care signed (Y/N):  N, sent 22  Outcome Measure: ABC confidence: 30%   Visit# / total visits:    per POC  Pain level: 0/10   Goals:     Patient goals: improve balance stability  Short term goals  Time Frame for Short term goals: Defer to [facility] Term Goals  Time Frame for Long Term Goals: 6 weeks 22  Pt will demo I with HEP/symptom management. Pt will demo >30 sec with tandem stance to demo improved balance recovery. Pt will demo eyes closed with feet apart and together >20 sec to demo improved proprioception with low light situations at home to decrease fall risk. Pt will demo  >1100ft with 6MWT with <5/10 Mod DWAYNE and <2/10 B calf pain to ease community mobility. Pt will demo 5x sit to stand <20 sec to demo improved LE strength to ease transfers. Summary of Evaluation:   Pt is 74year old female with 1 month sudden onset speech difficulties and being unstable. Ruled out stroke at ED. Pt now has difficulties completing prolonged mobility and general mobility without balance instability without falls at this time. Pt demo deficits this date that include B LE closed chain strength, poor proprioception, gait tolerance, reduced righting reaction and calf pain.   Pt will benefit with PT services with progression of strength/ROM and neuro re-ed to return to PLOF. Pt prior to onset of current condition had no pain and instability with able to complete full ADLs. Patient received education on their current pathology and how their condition effects them with their functional activities. Patient understood discussion and questions were answered. Patient understands their activity limitations and understands rational for treatment progression. Subjective:  See jose         Any changes in Ambulatory Summary Sheet? None        Objective:  See eval   COVID screening questions were asked and patient attested that there had been no contact or symptoms        Exercises: (No more than 4 columns)   Exercise/Equipment 7/22/22  #1 Date Date           WARM UP         Nu step             TABLE                                       STANDING      *Heel/toe raises  15x     Total gym squats       *gastroc lunge stretch at wall  15\"x4     FM fwd stepping                              PROPRIOCEPTION      *Tandem stance  20\"x3     *FT with trunk rotations  20\"x3     Perturbations on airex      Step taps on airex      Wobbleboard             MODALITIES                      Other Therapeutic Activities/Education:  Patient received education on their current pathology and how their condition effects them with their functional activities. Patient understood discussion and questions were answered. Patient understands their activity limitations and understands rational for treatment progression. Home Exercise Program:  HO issued, reviewed and discussed with patient. Pt agreed to comply. Manual Treatments:  --      Modalities:  --      Communication with other providers:  POC sent 7/22/22      Assessment:  (Response towards treatment session) (Pain Rating)     Pt is 74year old female with 1 month sudden onset speech difficulties and being unstable. Ruled out stroke at ED.  Pt now has difficulties completing prolonged mobility and general mobility without balance instability without falls at this time. Pt demo deficits this date that include B LE closed chain strength, poor proprioception, gait tolerance, reduced righting reaction and calf pain. Pt will benefit with PT services with progression of strength/ROM and neuro re-ed to return to PLOF. Pt prior to onset of current condition had no pain and instability with able to complete full ADLs. Patient received education on their current pathology and how their condition effects them with their functional activities. Patient understood discussion and questions were answered. Patient understands their activity limitations and understands rational for treatment progression. Plan for Next Session: review HEP, closed chain targeting quads/gastroc, balance stability on even/uneven surfaces to tolerance with perturbations/eye closed/foam to encoruage righting reactions.        Time In / Time Out:    [id number]          Timed Code/Total Treatment Minutes:  15/50'  15' Te, 1 PT Eval      Next Progress Note due: Nohemyal 7/22/22   Visit 10        Plan of Care Interventions:  [x] Therapeutic Exercise  [x] Modalities:  [x] Therapeutic Activity     [] Ultrasound  [] Estim  [] Gait Training      [] Cervical Traction [] Lumbar Traction  [x] Neuromuscular Re-education    [x] Cold/hotpack [] Iontophoresis   [x] Instruction in HEP      [x] Vasopneumatic   [] Dry Needling    [x] Manual Therapy               [] Aquatic Therapy              Electronically signed by:  Dawna Phillips, PT, DPT ,OCS  7/22/2022, 7:48 AM    7/22/2022 7:48 AM

## 2022-07-22 NOTE — PROGRESS NOTES
Physical Therapy: Initial Evaluation    Patient: Dean Renteria (8 y.o. female)   Examination Date:   Plan of Care Certification Period: 2022 to        :  1950 ;    Confirmed: Yes MRN: [id number]  CSN: [id number]   Insurance: Payor: [place] / Plan: Jean [place] / Product Type: *No Product type* /   Insurance ID: [id number] - (Medicare Managed) Secondary Insurance (if applicable):    Referring Physician: Jenelle Vital MD     PCP: Zoey Preciado MD Visits to Date/Visits Approved:   /      No Show/Cancelled Appts:   /       Medical Diagnosis: Other amnesia [R41.3]  Other abnormalities of gait and mobility [R26.89]    Treatment Diagnosis: reduced balance stability, deconditioning, LE weakness     PERTINENT MEDICAL HISTORY   Patient Assessed for Rehabilitation Services: Yes       Medical History: Chart Reviewed: Yes   Past Medical History:   Diagnosis Date    Hyperlipidemia     Hypertension     Thyroid disease      Surgical History:   Past Surgical History:   Procedure Laterality Date    CHOLECYSTECTOMY         Medications:   Current Outpatient Medications:     EQ ASPIRIN ADULT LOW DOSE 81 MG EC tablet, TAKE 1 TABLET BY MOUTH ONCE DAILY, Disp: , Rfl:     sertraline (ZOLOFT) 50 MG tablet, , Disp: , Rfl:     atorvastatin (LIPITOR) 40 MG tablet, , Disp: , Rfl:     amLODIPine (NORVASC) 5 MG tablet, TAKE 1 TABLET BY MOUTH ONCE DAILY, Disp: , Rfl:     naproxen (NAPROSYN) 500 MG tablet, Take 500 mg by mouth as needed for Pain (Patient not taking: Reported on 2022), Disp: , Rfl:     levothyroxine (SYNTHROID) 88 MCG tablet, Take 88 mcg by mouth Daily. , Disp: , Rfl:   Allergies: Patient has no known allergies. SUBJECTIVE EXAMINATION      ,           Subjective History:    Subjective:  with change in speech, \"feeling off,\" with ED visit with transfer to [facility] from [facility].  Completed MRI and determined that it was not a stroke and possible change in blood pressure. Did change medication with antibiotic, aspirin and Zoloft. Remains \"feeling off\" without stumbles and/or falls. Does feel she does deviate when she does walk. Cane rarely use when needed. SOB and legs are getting weak as well. Return Next Friday to Dr. Eric Whipple. Additional Pertinent Hx (if applicable):     Prior diagnostic testing[de-identified] MRI      Learning/Language: Learning  Does the patient/guardian have any barriers to learning?: No barriers  Will there be a co-learner?: No  What is the preferred language of the patient/guardian?: English  Is an  required?: No  How does the patient/guardian prefer to learn new concepts?: Listening, Demonstration     Pain Screening   Pain Screening  Patient Currently in Pain: No    Functional Status    Dominant Hand: : Right    Social History:  Social History  Lives With: Alone  Type of Home: House  Home Layout: One level  Home Access: Stairs to enter with rails  Entrance Stairs - Number of Steps: 3  Bathroom Shower/Tub: Tub/Shower unit  Bathroom Toilet: Standard  Bathroom Equipment: Grab bars in [address]: Cane    Occupation/Interests:  Occupation: Retired  Leisure & Hobbies: riding motorcycle.     Prior Level of Function:    Independent        Current Level of Function:         ADL Assistance: Independent  Homemaking Assistance: Independent  Ambulation Assistance: Independent  Transfer Assistance: Independent  Active : Yes  Mode of Transportation: Car    OBJECTIVE EXAMINATION   Restrictions:             Review of Systems:  Vision: Within Functional Limits  Hearing: Within functional limits  Overall Orientation Status: Within Normal Limits  Follows Commands: Within Functional Limits    VBI Screening / Lumbar Screening:        Regional Screen:         Observations:  General Observations  Description: No issues with standing with UE     5x sit to stand: 21.49 without UE assistance with quad fatigue. Ambulation/Gait (if applicable):   Min reduced step length    Balance Screen:  Balance  Tandem Stance R Le  Tandem Stance L Le  Single Stance R Le  Single Stance L Le  Comments: trunk righting reactions for recovery. Romberg/Narrow Base of Support  Eyes Open: 30 seconds  Sway: Minimal  Eyes Closed: 3 seconds  Sway: Max  Strategy: Hip  Sharpened Romberg/Tandem  Eyes Open: 22 seconds  Sway: Moderate  Eyes Closed: 3 seconds  Sway: Max  Strategy: Hip      Left AROM  Right AROM       WNL     WNL        Left PROM  Right PROM       WNL     WNL        Left Strength  Right Strength      General Strength Testing LE:  (6MWT: 985ft without rest break     7-8/10 Mod DWAYNE    4-5/10 B calf pain.)     4+/5 knee ext/hip ABD  5/5 all other directions  General Strength Testing LE:  (6MWT: 985ft without rest break     7-8/10 Mod DWAYNE    4-5/10 B calf pain.)    4+/5 knee ext/hip ABD   5/5 all other directions        Trunk Strength      WNL with appropriate iso activation in all directions. Muscle Length/Flexibility:  Gastroc stiffness limiting DF to 5 deg activity. Additional Finding(s) (if applicable):    ABC Confidence scale: 30% confidence       ASSESSMENT     Impression:  Pt is 74year old female with 1 month sudden onset speech difficulties and being unstable. Ruled out stroke at ED. Pt now has difficulties completing prolonged mobility and general mobility without balance instability without falls at this time. Pt demo deficits this date that include B LE closed chain strength, poor proprioception, gait tolerance, reduced righting reaction and calf pain. Pt will benefit with PT services with progression of strength/ROM and neuro re-ed to return to [facility]. Pt prior to onset of current condition had no pain and instability with able to complete full ADLs. Patient received education on their current pathology and how their condition effects them with their functional activities.  Patient reactions. Pt. actively involved in establishing Plan of Care and Goals: Yes  Patient/ Caregiver education and instruction: Goals, PT Role, Plan of Care, Evaluative findings, Insurance, Home Exercise Program             Treatment may include any combination of the following: Strengthening, Balance training, Home exercise program, Therapeutic activities, Manual Therapy - Soft Tissue Mobilization, Neuromuscular re-education, Gait training, Modalities, Endurance training     Frequency / Duration:  Patient to be seen 2 for 6 weeks      Eval Complexity: Overall Evaluation : Low  Decision Making: Low Complexity  History: Personal Factors and/or Comorbidities Impacting POC: Low  Examination of body system(s) including body structures and functions, activity limitations, and/or participation restrictions: Low  Clinical Presentation: Low              Therapist Signature: Shannon Chauhan, PT , DPT, OCS    Date: 7/22/2022 8/70/3207 39:17 AM   I certify that the above Therapy Services are being furnished while the patient is under my care. I agree with the treatment plan and certify that this therapy is necessary. Physician's Signature:  ___________________________   Date:_______                                                                   Jasvir Harden MD        Physician Comments: _______________________________________________    Please sign and return to [id number]  [facility]. Please fax to the location listed below.  Vinny Diggs for this referral!    [address] [facility] [address], [address] [address]  Dept: [phone]  Loc: [phone]

## 2022-07-26 NOTE — FLOWSHEET NOTE
Patients Plan of Care was received and signed. Signed POC was scanned and placed in the patients chart.     Ivania Charles

## 2022-09-01 ENCOUNTER — OFFICE VISIT (OUTPATIENT)
Dept: ORTHOPEDIC SURGERY | Age: 72
End: 2022-09-01
Payer: MEDICARE

## 2022-09-01 VITALS
SYSTOLIC BLOOD PRESSURE: 123 MMHG | HEIGHT: 64 IN | DIASTOLIC BLOOD PRESSURE: 74 MMHG | HEART RATE: 76 BPM | OXYGEN SATURATION: 98 % | BODY MASS INDEX: 25.3 KG/M2

## 2022-09-01 DIAGNOSIS — M17.12 PRIMARY OSTEOARTHRITIS OF LEFT KNEE: Primary | ICD-10-CM

## 2022-09-01 PROCEDURE — 20610 DRAIN/INJ JOINT/BURSA W/O US: CPT | Performed by: PHYSICIAN ASSISTANT

## 2022-09-01 ASSESSMENT — ENCOUNTER SYMPTOMS
EYES NEGATIVE: 1
GASTROINTESTINAL NEGATIVE: 1
RESPIRATORY NEGATIVE: 1

## 2022-09-01 NOTE — PROGRESS NOTES
Review of Systems   Constitutional: Negative. HENT: Negative. Eyes: Negative. Respiratory: Negative. Cardiovascular: Negative. Gastrointestinal: Negative. Genitourinary: Negative. Musculoskeletal:  Positive for arthralgias, joint swelling and myalgias. Skin: Negative. Negative for rash and wound. Neurological: Negative. Psychiatric/Behavioral: Negative. HPI:  Bettie Carreno is a 74 y.o. female that returns the office today with recurrent left knee pain. She does have significant severe arthritis in both knees but does not want a knee replacement. She would like a steroid injection today. Her last injection was almost 3 months ago but she is getting ready to go on a trip and would like another injection today. A lot of her pain is actually in the posterior part of the knee. She states that when she gets the injection it does relieve that pain. Past Medical History:   Diagnosis Date    Hyperlipidemia     Hypertension     Thyroid disease        Past Surgical History:   Procedure Laterality Date    CHOLECYSTECTOMY         History reviewed. No pertinent family history. Social History     Socioeconomic History    Marital status: Single     Spouse name: None    Number of children: None    Years of education: None    Highest education level: None   Tobacco Use    Smoking status: Never    Smokeless tobacco: Never   Substance and Sexual Activity    Alcohol use: Yes     Comment: rarely    Drug use: No       Current Outpatient Medications   Medication Sig Dispense Refill    EQ ASPIRIN ADULT LOW DOSE 81 MG EC tablet TAKE 1 TABLET BY MOUTH ONCE DAILY      sertraline (ZOLOFT) 50 MG tablet       atorvastatin (LIPITOR) 40 MG tablet       amLODIPine (NORVASC) 5 MG tablet TAKE 1 TABLET BY MOUTH ONCE DAILY      naproxen (NAPROSYN) 500 MG tablet Take 500 mg by mouth as needed for Pain      levothyroxine (SYNTHROID) 88 MCG tablet Take 88 mcg by mouth Daily.        No current facility-administered medications for this visit. No Known Allergies    Review of Systems:  See above      Physical Exam:   /74   Pulse 76   Ht 5' 4\" (1.626 m)   SpO2 98%   BMI 25.30 kg/m²        Gait is Normal. The patient can bear weight on the injured extremity. Gen/Psych:Examination reveals a pleasant individual in no acute distress. The patient is oriented to time, place and person. The patient's mood and affect are appropriate. Patient appears well nourished. Body habitus is normal     Lymph:  no lymphedema in bilateral lower extremities     Skin intact in bilateral lower extremities with no ulcerations, lesions, rash, erythema. Vascular: There are mild varicosities in bilateral lower extremities, sensation intact to light touch over bilateral lower extremities. left leg/knee exam:  Leg alignment:     neutral  Quadriceps/hamstring atrophy:   no  Knee effusion:    no   Knee erythema:   no  ROM:     0-125 degrees  Varus laxity at 0 and 30 deg's: no  Valguslaxity at 0 and 30 deg's: no  Recurvatum:    no  Tenderness at:   Medial joint line and posterior aspect of the knee. Bilateral Knee strength is 5/5 flexion and extension  There is + L2-S1 motor and sensory function in bilateral lower extremities    Outside record review: office notes and x-rays reviewed    X-ray of the left knee reviewed from November 2020 showed moderate degenerative change primarily affecting the medial compartment of the left knee. Impression:     Diagnosis Orders   1. Primary osteoarthritis of left knee  [place] ARTHROCENTESIS ASPIR&/INJ MAJOR JT/BURSA W/O US                Plan:    Patient Instructions   Continue weight-bearing as tolerated. Continue range of motion exercises as instructed. Ice and elevate as needed. Tylenol or Motrin for pain. Follow up as needed. Left knee injection procedure note    Pre-procedure diagnosis:  Left knee DJD    Post-procedure diagnosis:  same    Procedure:  The planned procedure/risks/benefits/alternatives were discussed with the patient. Risks include, but are not limited to, increased pain, drug reaction, infection, bleeding, lack of improvement, neurovascular injury, and increased blood sugar levels. The patient understood and all of their questions were answered. The Left knee was prepped with alcohol ,anesthetized with ethyl chloride spray then a 25 gauge needle was advanced into the inferior-lateral joint without difficulty. The joint was then injected with 1 ml 1% lidocaine, 1ml of Kenalog (40 mg/ml), 1ml 0.5% bupivacaine the injection site was cleansed with isopropyl alcohol and a band-aid was placed. Complications:  None, the patient tolerated the procedure well. Instructions: The patient was advised to rest the knee and decrease activity for the next 24 to 48 hours. May use prescription or OTC pain relievers as needed for any post-injection pain as well as ice.

## 2022-09-01 NOTE — PROGRESS NOTES
Patient seen in office for L knee injection. Reporting 5/10 pain level today. Wanting steroid injection today before her trip. Reporting no new concerns.

## 2022-10-17 ENCOUNTER — OFFICE VISIT (OUTPATIENT)
Dept: NEUROLOGY | Age: 72
End: 2022-10-17
Payer: MEDICARE

## 2022-10-17 DIAGNOSIS — R26.81 GAIT INSTABILITY: ICD-10-CM

## 2022-10-17 DIAGNOSIS — M54.16 LUMBAR RADICULOPATHY, CHRONIC: Primary | ICD-10-CM

## 2022-10-17 PROCEDURE — 95910 NRV CNDJ TEST 7-8 STUDIES: CPT | Performed by: PHYSICAL MEDICINE & REHABILITATION

## 2022-10-17 PROCEDURE — 95886 MUSC TEST DONE W/N TEST COMP: CPT | Performed by: PHYSICAL MEDICINE & REHABILITATION

## 2022-10-17 NOTE — PROGRESS NOTES
EMG    Risks and benefits of study discussed. Specific and common risks of pain and bleeding as well as uncommon side effects of infection, hematoma and vasovagal episodes    Patient agreeable to testing and consents to such. Clinical: Several years of left greater than right knee pain, posterior popliteal pain for which she gets frequent joint injections. Lesser severity but noticeable low back pain with mild radiation. No numbness or weakness. Motor NCS:  Tibial and peroneal amplitudes, latencies and velocities normal bilateral    Sensory NCS:  Sural and peroneal amplitudes and latencies normal bilateral    Needle EMG: Normal findings throughout both lower limbs    Impression:  #1 Regarding lumbar radiculopathy symptoms:  normal study without axon loss associated with lumbar radiculopathy.  - Normal EMG can not rule out painful/sensory radiculopathy, without motor axon loss involvement. #2 no findings to suggest generalized peripheral neuropathy, mononeuropathy, myopathy or plexopathy.

## 2022-10-25 ENCOUNTER — OFFICE VISIT (OUTPATIENT)
Dept: NEUROLOGY | Age: 72
End: 2022-10-25
Payer: MEDICARE

## 2022-10-25 VITALS
WEIGHT: 147.4 LBS | OXYGEN SATURATION: 97 % | DIASTOLIC BLOOD PRESSURE: 66 MMHG | SYSTOLIC BLOOD PRESSURE: 114 MMHG | HEART RATE: 83 BPM | BODY MASS INDEX: 25.16 KG/M2 | HEIGHT: 64 IN

## 2022-10-25 DIAGNOSIS — R26.81 GAIT INSTABILITY: ICD-10-CM

## 2022-10-25 DIAGNOSIS — R41.3 MEMORY IMPAIRMENT: Primary | ICD-10-CM

## 2022-10-25 DIAGNOSIS — M54.16 LUMBAR RADICULOPATHY, CHRONIC: ICD-10-CM

## 2022-10-25 DIAGNOSIS — G25.0 BENIGN ESSENTIAL TREMOR: ICD-10-CM

## 2022-10-25 PROCEDURE — 1123F ACP DISCUSS/DSCN MKR DOCD: CPT | Performed by: NURSE PRACTITIONER

## 2022-10-25 PROCEDURE — 99214 OFFICE O/P EST MOD 30 MIN: CPT | Performed by: NURSE PRACTITIONER

## 2022-10-25 NOTE — PROGRESS NOTES
10/25/22    Nanci Rodríguez  1950    Chief Complaint   Patient presents with    Cerebrovascular Accident     Pt presents for f/u of CVA, memory issues, balance issues, pt states improvement with balance and memory since last seen, pt states her daughter believes she has not improved though        History of Present Illness  Nam is a 74 y.o. female presenting today for follow-up of: Possible CVA, balance issues and memory impairment. Last office visit 7/22/2022. Nam had been evaluated at [facility] in June 2022 for expressive dysphagia. Chart review stated Nam had been complaining of headache a week prior to this occurrence. Nam had been not acting like her normal self per family and reported having a headache and was dizzy. Does have history of chronic dizziness for which she is on Antivert. She had difficulty getting her words out. She was taken to ED for evaluation, CT head and CTA head and neck were nonacute. Nam refused MRI at that time, 2D echo without stroke precipitants. Had not been evaluated by neurology during hospitalization. Nam did have MRI outpatient 7/14/2022 that showed no acute intracranial abnormality, did show extensive patchy T2 flair signal hyperintensities in cerebral white matter and piyush however imaging was not available for Dr. Fadia Mathew to personally review. There was a concern for ADEM versus microvascular disease on MRI. Nam reported feeling shaky and off balance since that episode occurred. She reported having a lot of stress and anxiety reporting that 2 of her children would not talk to her and her mother had taken her own life in the 76s. She reports to me today that now the 3 of her kids are not speaking to her. Nam was not interested in any in office memory testing to evaluate for psychiatric versus organic causes of memory loss, she did not feel there was anything wrong with her memory as of last office visit.   She was encouraged to follow-up with PCP for management of her mental health history of anxiety/depression. She has not yet seen PCP to discuss management of anxiety/depression/stress, she has an appointment next month. She continues to have struggles with stress and anxiety. She manages her own finances, manages her medications, drives without getting lost.    In regard to balance issues, lower extremity EMG studies were ordered to look for peripheral neuropathy, labs for reversible causes of both peripheral neuropathy and memory impairment were also ordered. EMG study was normal.  WNL. Neo Olson participated in PT in July X2 visits. She tells me that she was never told to come back for therapy. She feels her balance has improved, she denies having any falls. She is getting injections in her knee r/t severe arthritis in her knee and does not want surgery for replacement. Neo Olson also had essential tremor, no new medications were added on last visit. She feels her tremor has improved. Neo Olson remains on aspirin and statin for secondary stroke prevention. Current Outpatient Medications   Medication Sig Dispense Refill    EQ ASPIRIN ADULT LOW DOSE 81 MG EC tablet TAKE 1 TABLET BY MOUTH ONCE DAILY      sertraline (ZOLOFT) 50 MG tablet       atorvastatin (LIPITOR) 40 MG tablet       amLODIPine (NORVASC) 5 MG tablet TAKE 1 TABLET BY MOUTH ONCE DAILY      naproxen (NAPROSYN) 500 MG tablet Take 500 mg by mouth as needed for Pain      levothyroxine (SYNTHROID) 88 MCG tablet Take 88 mcg by mouth Daily. No current facility-administered medications for this visit.        Physical Exam:    Constitutional              Weight: well nourished, properly dressed, adequate hygiene but interestingly patchy shaved head  Heart/Vascular              No cyanosis or clubbing appreciated  Neck              Appearance/Palpation/Auscultation: supple  Mental Status              Orientation: oriented to person, oriented to place, oriented to problem, and oriented to time              Mood/Affect: appropriate mood and  very anxious              Memory/Other: recent memory intact, remote memory intact, fund of knowledge intact, attention span normal, and concentration normal  MMSE 10/25/2022: difficulty with serial subtraction, struggled terribly naming five things starting with letter \"T\", 3/3 word recall.   Language              Language: (normal) language, no dysarthria, (normal) articulation, and no dysphasia/aphasia  Cranial Nerves              CN II Right: visual fields appear intact              CN II Left: visual fields appear intact              CN III, IV, VI: EOM no nystagmus, normal pursuit, and extraocular muscle strength normal              CN III: pupil normal size, pupil reactive to light and dark, pupil accomodates, and no ptosis              CN IV: normal              CN VI: normal              CN V Right: normal sensation and muscles of mastication intact              CN V Left: normal sensation and muscles of mastication intact              CN VII Right: normal facial expression              CN VII Left: normal facial expression              CN VIII Right: hearing in tact to normal conversation              CN VIII Left: hearing in tact to normal conversation              CN IX,X: normal palatal movement              CN XI Right: normal sternocleidomastoid and normal trapezius              CN XI Left: normal sternocleidomastoid and normal trapezius              CN XII: no tremors of the tongue, no fasciculation of the tongue, tongue protrudes midline, normal power to left, and normal power to right  Gait and Stance              Gait/Posture: station normal, ambulates independently, and gait normal  Motor/Coordination Exam              General: no bradykinesia, no chorea, no athetosis, no myoclonus, and no dyskinesia right hand with high frequency low amplitude action tremor-very mild              Right Upper Extremity: normal motor strength, normal bulk, and normal tone              Left Upper Extremity: normal motor strength, normal bulk, and normal tone              Right Lower Extremity: normal motor strength, normal bulk, and normal tone              Left Lower Extremity: normal motor strength, normal bulk, and normal tone              Coordination: no drift, normal finger-to-nose, normal heel-to-shin, and rapid alternating movements normal moderate sway with Romberg. Reflexes              Reflexes Right: 2/4 biceps, brachioradialis, 1/4 patellar, and achilles               Reflexes Left: 2/4 biceps, brachioradialis, 1/4 patellar, and achilles               Plantar Reflex Right: response downgoing              Plantar Reflex Left: response downgoing              Hoffmans Reflex Right: absent              Hoffmans Reflex Left: absent              Frontal Release Signs: frontal release signs absent  Sensory              Sensation: normal light touch, decreased pinprick length dependent, decreased temperature, normal vibration, difficulties consistently answering questions when testing proprioception. normal DSS, and no neglect        /66 (Site: Left Upper Arm, Position: Sitting, Cuff Size: Large Adult)   Pulse 83   Ht 5' 4\" (1.626 m)   Wt 147 lb 6.4 oz (66.9 kg)   SpO2 97%   BMI 25.30 kg/m²     Assessment and Plan     Diagnosis Orders   1. Memory impairment        2. Gait instability  [facility]      3. Lumbar radiculopathy, chronic        4. Benign essential tremor          I am going to reorder physical therapy to help work on Elvia's balance to help decrease her risk for falls. I did encourage Lupillo Dela Cruz to follow-up with PCP to discuss her anxiety, depression, stress issues for better management as this can impair her ability to focus which can affect her memory. She did well on MMSE today however she was extremely anxious and did have difficulty focusing.   Surprisingly, her tremor was very mild and only noticeable in right hand during examination. I will plan on following up with Erasmo Arteaga again in 3 months, sooner if the need arises. We discussed nonpharmacologic interventions including staying active cognitively, socially, and physically to help slow down the progression of memory loss. I have requested the disc from her brain MRI from [place] so that we may look at actual imaging in regard to the patchy hyperintensities noted in MRI report. I will forward this on to Dr. Rose Marie Sandoval as well. Return in about 3 months (around 1/25/2023).     GIDEON Huddleston - NP

## 2022-11-10 ENCOUNTER — HOSPITAL ENCOUNTER (OUTPATIENT)
Dept: PHYSICAL THERAPY | Age: 72
Setting detail: THERAPIES SERIES
Discharge: HOME | End: 2022-11-10
Payer: MEDICARE

## 2022-11-10 PROCEDURE — 97110 THERAPEUTIC EXERCISES: CPT

## 2022-11-10 PROCEDURE — 97161 PT EVAL LOW COMPLEX 20 MIN: CPT

## 2022-11-10 NOTE — FLOWSHEET NOTE
Outpatient Physical Therapy  [place]           [x] Phone: [phone]   Fax: [phone]  [facility]           [] Phone: [phone]   Fax: [phone]        Physical Therapy Daily Treatment Note  Date:  11/10/2022    Patient Name:  Alka Alberto    :  1950  MRN: [id number]  Restrictions/Precautions: n/a  Diagnosis:    Diagnosis: Gait Instability  Date of Injury/Surgery:   Treatment Diagnosis:  L knee pain, impaired gait, impaired balance  Insurance/Certification information: [employer] - $20 copay, 12 PCY before precauth  Referring Physician:   Antwan Meyer NP   Next Doctor Visit:    Plan of care signed (Y/N):  sent 11/10  Outcome Measure: LEFS:   Visit# / total visits:     Pain level: 0/10   Goals:     Patient goals: reduce knee pain and improve walking  Short term goals  Time Frame for Short term goals: Refer to [facility]                 Long Term Goals  Time Frame for Long Term Goals: 8 visits  Pt will improve RODRIGUEZ to 41 or more to show improved balance and low fall risk  Pt will improve TUG to 13 seconds or less to show improved gait  Pt will improve gross LLE strength to 4+/5 (4/5 hip) to aide in gait  Pt will improve hamstring 90/90 on L to lacking 30 deg or less to aide in TKE during gait  Pt will improve LEFS to 52 or more to show improved subjective report and MDC      Summary of Evaluation:  Assessment: Pt is a 75-year-old female who presents to therapy with chronic L knee pain and worsening gait within the last few weeks, denies falls. Upon assessment, pt does present with impaired balance, impaired gait, BLE (L>R) weakness, L hamstring and gastroc tightness, L knee pain and overall reduced mobility independence secondary to mild balance impairments and L knee involvement. Pt would benefit from skilled therapy interventions to address listed impairments, progress toward goal completion and improve ADL/IADL status.  PT also warranted to reduce risk for further injury or decline. Subjective:  See eval         Any changes in Ambulatory Summary Sheet? None        Objective:  See eval   COVID screening questions were asked and patient attested that there had been no contact or symptoms        Exercises: (No more than 4 columns)   Exercise/Equipment 11/10/22 #1 Date Date           WARM UP                     TABLE      SLR X10 L     SAQ X10 L     Ham stretch X30\" L                    STANDING      Gastroc stretch X30\" L                                              PROPRIOCEPTION                                    MODALITIES                      Other Therapeutic Activities/Education:  HEP and importance of completion, POC and goals, anatomy and physiology related to condition    Home Exercise Program: Issued, practiced and pt demo ability to perform 11/10/2022    Manual Treatments:  none      Modalities:  none      Communication with other providers:  POC sent      Assessment:  Pt tolerated today's treatment without any adverse reactions or complications this date. End pain: same  Assessment: Pt is a 68-year-old female who presents to therapy with chronic L knee pain and worsening gait within the last few weeks, denies falls. Upon assessment, pt does present with impaired balance, impaired gait, BLE (L>R) weakness, L hamstring and gastroc tightness, L knee pain and overall reduced mobility independence secondary to mild balance impairments and L knee involvement. Pt would benefit from skilled therapy interventions to address listed impairments, progress toward goal completion and improve ADL/IADL status. PT also warranted to reduce risk for further injury or decline.       Plan for Next Session:   Specific Instructions for Next Treatment: L knee strength, L hamstring and gastroc stretching, balance, gait    Time In / Time Out:  1323 - 1406      Timed Code/Total Treatment Minutes:  37': 9' TE x1, 34' Eval x1    Next Progress Note due:  6th visit    Plan of Care Interventions:  [x] Therapeutic Exercise  [x] Modalities:  [x] Therapeutic Activity     [] Ultrasound  [] Estim  [x] Gait Training      [] Cervical Traction [] Lumbar Traction  [x] Neuromuscular Re-education    [x] Cold/hotpack [] Iontophoresis   [x] Instruction in HEP      [x] Vasopneumatic   [] Dry Needling    [x] Manual Therapy               [] Aquatic Therapy              Electronically signed by:  Raleigh Abdullahi PT, DPT 11/10/2022, 2:19 PM

## 2022-11-10 NOTE — PLAN OF CARE
Outpatient Physical Therapy           [place]           [x] Phone: [phone]   Fax: [phone]  [place]           [] Phone: [phone]   Fax: [phone]     To: Lindsay Hill NP   From: Karen Baker PT, DPT     Patient: Brooklynn Pineda       : 1950  Diagnosis:  Diagnosis: Gait Instability  Treatment Diagnosis: L knee pain, impaired gait, impaired balance  Date: 11/10/2022    Physical Therapy Certification/Re-Certification Form  Dear Lindsay Hill,   The following patient has been evaluated for physical therapy services and for therapy to continue, insurance requires physician review of the treatment plan initially and every 90 days. Please review the attached evaluation and/or summary of the patient's plan of care, and verify that you agree therapy should continue by signing the attached document and sending it back to our office. Assessment:    Assessment: Pt is a 73-year-old female who presents to therapy with chronic L knee pain and worsening gait within the last few weeks, denies falls. Upon assessment, pt does present with impaired balance, impaired gait, BLE (L>R) weakness, L hamstring and gastroc tightness, L knee pain and overall reduced mobility independence secondary to mild balance impairments and L knee involvement. Pt would benefit from skilled therapy interventions to address listed impairments, progress toward goal completion and improve ADL/IADL status. PT also warranted to reduce risk for further injury or decline. Patient agrees with established plan of care and assisted in the development of their short term and long term goals. Patient had no adverse reaction with initial treatment and there are no barriers to learning. Learning preferences include demonstration, practice, and handouts. Patient expressed understanding of HEP and appears to be motivated to participate in an active PT program including compliance with HEP expectations.       Plan of Care/Treatment to date:  [x] Therapeutic Exercise  [x] Modalities:  [x] Therapeutic Activity     [] Ultrasound  [] Electrical Stimulation  [x] Gait Training      [] Cervical Traction [] Lumbar Traction  [x] Neuromuscular Re-education    [x] Cold/hotpack [] Iontophoresis   [x] Instruction in HEP      [x] Vasopneumatic    [] Dry Needling  [x] Manual Therapy               [] Aquatic Therapy       Other:          Frequency/Duration:  # Days per week: [x] 1 day # Weeks: [] 1 week [] 5 weeks     [x] 2 days   [] 2 weeks [x] 6 weeks     [] 3 days   [] 3 weeks [] 7 weeks     [] 4 days   [] 4 weeks [] 8 weeks         [] 9 weeks [] 10 weeks         [] 11 weeks [] 12 weeks    Rehab Potential/Progress: [] Excellent [x] Good [] Fair  [] Poor     Goals:    Patient goals: reduce knee pain and improve walking  Short term goals  Time Frame for Short term goals: Refer to [facility]                 Long Term Goals  Time Frame for Long Term Goals: 8 visits  Pt will improve RODRIGUEZ to 41 or more to show improved balance and low fall risk  Pt will improve TUG to 13 seconds or less to show improved gait  Pt will improve gross LLE strength to 4+/5 (4/5 hip) to aide in gait  Pt will improve hamstring 90/90 on L to lacking 30 deg or less to aide in TKE during gait  Pt will improve LEFS to 52 or more to show improved subjective report and [address]        Electronically signed by:  Naveed Romero, PT, DPT, 11/10/2022, 2:18 PM        If you have any questions or concerns, please don't hesitate to call.   Thank you for your referral.      Physician Signature:________________________________Date:_________ TIME: _____  By signing above, therapists plan is approved by physician

## 2022-11-14 ENCOUNTER — CLINICAL DOCUMENTATION (OUTPATIENT)
Dept: NEUROLOGY | Age: 72
End: 2022-11-14

## 2022-11-14 NOTE — PROGRESS NOTES
Received patient's MRI brain images from [facility]. Reviewed imaging personally. MRI does demonstrate extensive T2 flair signal hyperintensities that I do feel is most consistent with underlying chronic microvascular disease rather than an autoimmune or infectious etiology. Do not feel that further work-up is indictated at this time in this regard.      Adwoa Laguna DO 11/14/2022 1:40 PM

## 2022-11-22 ENCOUNTER — HOSPITAL ENCOUNTER (OUTPATIENT)
Dept: PHYSICAL THERAPY | Age: 72
Setting detail: THERAPIES SERIES
Discharge: HOME | End: 2022-11-22
Payer: MEDICARE

## 2022-11-22 PROCEDURE — 97110 THERAPEUTIC EXERCISES: CPT

## 2022-11-22 PROCEDURE — 97140 MANUAL THERAPY 1/> REGIONS: CPT

## 2022-11-22 NOTE — FLOWSHEET NOTE
also warranted to reduce risk for further injury or decline. Subjective:  Pt reports 6/10 pain today. She is doing her HEP. Any changes in Ambulatory Summary Sheet? None        Objective:    COVID screening questions were asked and patient attested that there had been no contact or symptoms    At arrival ambulation with SPC stiff posture, fwd flexed posture, WBOS, decreased heel strike and toe off with knee in flexed position   High guarding and pain focus  Poor QS    Cued for valsalva. Having patient count aloud  Quad lag with SLR    Exercises: (No more than 4 columns)   Exercise/Equipment 11/10/22 #1 11/22/22 #2 Date           WARM UP       Nustep  Lv 1 6'          TABLE      QS  Towel under ankle  X 10 5\"      SLR X10 L X 10 L  5\"up/down    SAQ X10 L X 10 L black wedge    Ham stretch X30\" L manual                   STANDING      Gastroc stretch X30\" L manual    Hip ext way  X 10    marches  X 10    HS curls  X 10    Step up  X10  ea. 4\"                     PROPRIOCEPTION                                    MODALITIES                      Other Therapeutic Activities/Education:  HEP and importance of completion, POC and goals, anatomy and physiology related to condition    Home Exercise Program: Issued, practiced and pt demo ability to perform 11/10/2022    Manual Treatments: HS stretch, gastroc stretch, roller on HS, neuromuscular calming      Modalities:  none      Communication with other providers:  POC sent      Assessment:  Pt tolerated today's treatment without any adverse reactions or complications this date with added exercises. Updated HEP. Noted severe guarding and L LE tightness and weakness. Able to ambulate a little better post xtx with less pain. Pt would continue to benefit from skilled therapy interventions to address remaining impairments, improve mobility and strength and progress toward goal completion while reducing risk for re-injury or further decline.    End pain: 5/10 posterior L knee         Plan for Next Session:   Specific Instructions for Next Treatment: L knee strength, L hamstring and gastroc stretching, balance, gait    Time In / Time Out:  1345 - 1430      Timed Code/Total Treatment Minutes:  45'/45' 2 TE 1 MT     Next Progress Note due:  6th visit    Plan of Care Interventions:  [x] Therapeutic Exercise  [x] Modalities:  [x] Therapeutic Activity     [] Ultrasound  [] Estim  [x] Gait Training      [] Cervical Traction [] Lumbar Traction  [x] Neuromuscular Re-education    [x] Cold/hotpack [] Iontophoresis   [x] Instruction in HEP      [x] Vasopneumatic   [] Dry Needling    [x] Manual Therapy               [] Aquatic Therapy              Electronically signed by:  Radha Rawls PTA, CLT 11/22/2022, 1:50 PM

## 2022-11-22 NOTE — PLAN OF CARE
Patients Plan of Care was received and signed. Signed POC was scanned and placed in the patients chart.     Kvng Pretty

## 2022-11-30 ENCOUNTER — HOSPITAL ENCOUNTER (OUTPATIENT)
Dept: PHYSICAL THERAPY | Age: 72
Setting detail: THERAPIES SERIES
Discharge: HOME | End: 2022-11-30
Payer: MEDICARE

## 2022-11-30 PROCEDURE — 97530 THERAPEUTIC ACTIVITIES: CPT

## 2022-11-30 PROCEDURE — 97110 THERAPEUTIC EXERCISES: CPT

## 2022-11-30 NOTE — FLOWSHEET NOTE
Outpatient Physical Therapy  [facility]           [x] Phone: [phone]   Fax: [phone]  [facility]           [] Phone: [phone]   Fax: [phone]           Physical Therapy Daily Treatment Note  Date:  11/10/2022     Patient Name:  Keira Odonnell                  :  1950                     MRN: [id number]  Restrictions/Precautions: n/a  Diagnosis:    Diagnosis: Gait Instability  Date of Injury/Surgery:   Treatment Diagnosis:  L knee pain, impaired gait, impaired balance  Insurance/Certification information: [employer] - $20 copay, 12 PCY before precauth  Referring Physician:   Bradley Nix NP   Next Doctor Visit:    Plan of care signed (Y/N):  Yes  Outcome Measure: LEFS:   Visit# / total visits:   3/6  Pain level:      6/10 L posterior and lateral thighs    Goals:     Patient goals: reduce knee pain and improve walking  Short term goals  Time Frame for Short term goals: Refer to [facility]  Long Term Goals  Time Frame for Long Term Goals: 8 visits  Pt will improve RODRIGUEZ to 41 or more to show improved balance and low fall risk  Pt will improve TUG to 13 seconds or less to show improved gait  Pt will improve gross LLE strength to 4+/5 (4/5 hip) to aide in gait  Pt will improve hamstring 90/90 on L to lacking 30 deg or less to aide in TKE during gait  Pt will improve LEFS to 52 or more to show improved subjective report and MDC        Summary of Evaluation:  Assessment: Pt is a 66-year-old female who presents to therapy with chronic L knee pain and worsening gait within the last few weeks, denies falls. Upon assessment, pt does present with impaired balance, impaired gait, BLE (L>R) weakness, L hamstring and gastroc tightness, L knee pain and overall reduced mobility independence secondary to mild balance impairments and L knee involvement. Pt would benefit from skilled therapy interventions to address listed impairments, progress toward goal completion and improve ADL/IADL status.  PT also warranted to reduce risk for further injury or decline. Subjective:  Pt reports 6/10 pain today. She is doing her HEP. The left side         Any changes in Ambulatory Summary Sheet? None        Objective:    COVID screening questions were asked and patient attested that there had been no contact or symptoms    At arrival ambulation with SPC stiff posture, fwd flexed posture, WBOS, decreased heel strike and toe off with knee in flexed position     Strong TTP medial L knee and pes anserine area with bilateral fluid retention at medial knees    L>R HS tightness limiting TKE, mild improvement in HS length on R post tx    Quad lag with SLR  Cued for TA engagement  Adductor compensation with HS activation  Forearm support with standing exercises  Improved breathing with effort    Exercises: (No more than 4 columns)   Exercise/Equipment 11/10/22 #1 11/22/22 #2 11/30/22 #3           WARM UP       Nustep  Lv 1 6' Lv 1 3' UE/ 3' NO UE but eventually pushing on legs UE         TABLE      QS  Towel under ankle  X 10 5\"      SLR X10 L X 10 L  5\"up/down X 10 ea. W TA   SAQ X10 L X 10 L black wedge    Ham stretch X30\" L manual 30s x 2 R/L alt   Bridge    X 10   HL SL clam   R/L RTB x 10                  STANDING      Gastroc stretch X30\" L manual    Hip ext way  X 10 x10   marches  X 10 x10   HS curls  X 10 x10   Step up  X10  ea. 4\" X 10 ea. 4\" R/L    Step over lat   X 10 ea. PROPRIOCEPTION                                    MODALITIES                      Other Therapeutic Activities/Education:  HEP and importance of completion, POC and goals, anatomy and physiology related to condition    Home Exercise Program: Issued, practiced and pt demo ability to perform 11/10/2022    Access Code: [email or web address]  URL: [email or web address]  Date: 11/30/2022  Prepared by: Anita Christensen    Exercises  Supine Bridge - 1 x daily - 5 x weekly - 1-3 sets - 10 reps  Hooklying Clamshell with Resistance - 1 x daily - 5 x weekly - 1-3 sets - 10 reps  Supine March with Resistance Band - 1 x daily - 5 x weekly - 1-3 sets - 10 reps  Standing Hip Extension with Counter Support - 1 x daily - 5 x weekly - 1-3 sets - 10 reps  Standing Hip Abduction with Counter Support - 1 x daily - 5 x weekly - 1-3 sets - 10 reps  Standing Knee Flexion with Unilateral Counter Support - 1 x daily - 5 x weekly - 1-3 sets - 10 reps  Supine Single Leg Lift - 1 x daily - 5 x weekly - 1-3 sets - 10 reps  Seated Hamstring Stretch - 1 x daily - 5 x weekly - 3 sets  Long Sitting Quad Set with Towel Roll Under Heel - 1 x daily - 5 x weekly - 1-3 sets - 10 reps      Manual Treatments: x      Modalities:  none      Communication with other providers:  POC sent      Assessment:  Pt tolerated today's treatment without any adverse reactions or complications this date with added standing and core exercises. Updated HEP. Improved breathing management with effort. HS length limitation and compensatory technique with exercises. Difficulty with dynamic SL activities. Pt would continue to benefit from skilled therapy interventions to address remaining impairments, improve mobility and strength and progress toward goal completion while reducing risk for re-injury or further decline.    End pain: 5/10 posterior L knee       Plan for Next Session:   Specific Instructions for Next Treatment: L knee strength, L hamstring and gastroc stretching, balance, gait    Time In / Time Out:  1300 - 1345      Timed Code/Total Treatment Minutes:  45'/45' 2 TE 1 TA    Next Progress Note due:  6th visit    Plan of Care Interventions:  [x] Therapeutic Exercise  [x] Modalities:  [x] Therapeutic Activity     [] Ultrasound  [] Estim  [x] Gait Training      [] Cervical Traction [] Lumbar Traction  [x] Neuromuscular Re-education    [x] Cold/hotpack [] Iontophoresis   [x] Instruction in HEP      [x] Vasopneumatic   [] Dry Needling    [x] Manual Therapy               [] Aquatic Therapy Electronically signed by:  Mirna Dykes PTA, HAO 11/30/2022, 1:00 PM

## 2023-01-13 ENCOUNTER — OFFICE VISIT (OUTPATIENT)
Dept: ORTHOPEDIC SURGERY | Age: 73
End: 2023-01-13

## 2023-01-13 VITALS
OXYGEN SATURATION: 99 % | HEART RATE: 70 BPM | BODY MASS INDEX: 24.62 KG/M2 | HEIGHT: 64 IN | RESPIRATION RATE: 16 BRPM | WEIGHT: 144.2 LBS

## 2023-01-13 DIAGNOSIS — M17.12 PRIMARY OSTEOARTHRITIS OF LEFT KNEE: Primary | ICD-10-CM

## 2023-01-13 NOTE — PATIENT INSTRUCTIONS
Continue to weight bear as tolerated  Continue range of motion   Ice and elevate as needed  Tylenol or motrin for pain   Injection given today in office   Rest for 24-48 hours  Follow up in 3 months    We are committed to providing you the best care possible. If you receive a survey after visiting one of our offices, please take time to share your experience concerning your physician office visit. These surveys are confidential and no health information about you is shared. We are eager to improve for you and we are counting on your feedback to help make that happen.

## 2023-01-13 NOTE — PROGRESS NOTES
Review of Systems   Constitutional: Negative. HENT: Negative. Eyes: Negative. Respiratory: Negative. Cardiovascular: Negative. Gastrointestinal: Negative. Genitourinary: Negative. Musculoskeletal:  Positive for arthralgias, joint swelling and myalgias. Skin: Negative. Negative for rash and wound. Neurological: Negative. Psychiatric/Behavioral: Negative. HPI:  Dora Monterroso is a 74 y.o. female that again returns to the office today with persistent left knee pain. She has had injections in the past and had decent relief with them and would like to discuss doing another injection in the left knee. Her last injection was greater than 3 months ago. She rates her pain at a 10/10, aching and throbbing in nature. She does not want to discuss knee replacement at this time. Past Medical History:   Diagnosis Date    Hyperlipidemia     Hypertension     Thyroid disease        Past Surgical History:   Procedure Laterality Date    CHOLECYSTECTOMY         No family history on file. Social History     Socioeconomic History    Marital status: Single   Tobacco Use    Smoking status: Never    Smokeless tobacco: Never   Substance and Sexual Activity    Alcohol use: Yes     Comment: rarely    Drug use: No       Current Outpatient Medications   Medication Sig Dispense Refill    EQ ASPIRIN ADULT LOW DOSE 81 MG EC tablet TAKE 1 TABLET BY MOUTH ONCE DAILY      sertraline (ZOLOFT) 50 MG tablet       atorvastatin (LIPITOR) 40 MG tablet       levothyroxine (SYNTHROID) 88 MCG tablet Take 88 mcg by mouth Daily. amLODIPine (NORVASC) 5 MG tablet TAKE 1 TABLET BY MOUTH ONCE DAILY (Patient not taking: Reported on 1/13/2023)      naproxen (NAPROSYN) 500 MG tablet Take 500 mg by mouth as needed for Pain (Patient not taking: Reported on 1/13/2023)       No current facility-administered medications for this visit.        No Known Allergies    Review of Systems:  See above      Physical Exam: Pulse 70   Resp 16   Ht 5' 4\" (1.626 m)   Wt 144 lb 3.2 oz (65.4 kg)   SpO2 99%   BMI 24.75 kg/m²        Gait is Normal. The patient can bear weight on the injured extremity. Gen/Psych:Examination reveals a pleasant individual in no acute distress. The patient is oriented to time, place and person. The patient's mood and affect are appropriate. Patient appears well nourished. Body habitus is normal     Lymph:  no lymphedema in bilateral lower extremities     Skin intact in bilateral lower extremities with no ulcerations, lesions, rash, erythema. Vascular: There are mild varicosities in bilateral lower extremities, sensation intact to light touch over bilateral lower extremities. left leg/knee exam:  Leg alignment:     neutral  Quadriceps/hamstring atrophy:   no  Knee effusion:    no   Knee erythema:   no  ROM:     0-125 degrees  Varus laxity at 0 and 30 deg's: no  Valguslaxity at 0 and 30 deg's: no  Recurvatum:    no  Tenderness at:   Medial joint line and posterior aspect of the knee. Bilateral Knee strength is 5/5 flexion and extension  There is + L2-S1 motor and sensory function in bilateral lower extremities    Outside record review: office notes and x-rays reviewed    Imaging studies:  4 views of the left knee taken and reviewed in the office today show severe degenerative changes where there is bone-on-bone articulation within the medial compartment of the right knee as well as loss of joint space within the lateral compartment. There is slight  Lateral subluxation of the tibia in relationship to the femur. No acute fractures or dislocations noted. The official read and interpretation of these x-rays will be done by the the [facility]     Impression:     Diagnosis Orders   1.  Primary osteoarthritis of left knee  NV ARTHROCENTESIS ASPIR&/INJ MAJOR JT/BURSA W/O US                Plan:    Patient Instructions   Continue to weight bear as tolerated  Continue range of motion   Ice and elevate as needed  Tylenol or motrin for pain   Injection given today in office   Rest for 24-48 hours  Follow up in 3 months    We are committed to providing you the best care possible. If you receive a survey after visiting one of our offices, please take time to share your experience concerning your physician office visit. These surveys are confidential and no health information about you is shared. We are eager to improve for you and we are counting on your feedback to help make that happen. Left knee injection procedure note    Pre-procedure diagnosis:  Left knee DJD    Post-procedure diagnosis:  same    Procedure: The planned procedure/risks/benefits/alternatives were discussed with the patient. Risks include, but are not limited to, increased pain, drug reaction, infection, bleeding, lack of improvement, neurovascular injury, and increased blood sugar levels. The patient understood and all of their questions were answered. The Left knee was prepped with alcohol ,anesthetized with ethyl chloride spray then a 25 gauge needle was advanced into the inferior-lateral joint without difficulty. The joint was then injected with 1 ml 1% lidocaine, 1ml of Kenalog (40 mg/ml), 1ml 0.5% bupivacaine the injection site was cleansed with isopropyl alcohol and a band-aid was placed. Complications:  None, the patient tolerated the procedure well. Instructions: The patient was advised to rest the knee and decrease activity for the next 24 to 48 hours. May use prescription or OTC pain relievers as needed for any post-injection pain as well as ice.

## 2023-01-15 ASSESSMENT — ENCOUNTER SYMPTOMS
EYES NEGATIVE: 1
GASTROINTESTINAL NEGATIVE: 1
RESPIRATORY NEGATIVE: 1

## 2023-01-24 ENCOUNTER — OFFICE VISIT (OUTPATIENT)
Dept: NEUROLOGY | Age: 73
End: 2023-01-24
Payer: MEDICARE

## 2023-01-24 VITALS
OXYGEN SATURATION: 94 % | HEART RATE: 63 BPM | BODY MASS INDEX: 24.62 KG/M2 | DIASTOLIC BLOOD PRESSURE: 76 MMHG | SYSTOLIC BLOOD PRESSURE: 136 MMHG | HEIGHT: 64 IN | WEIGHT: 144.2 LBS

## 2023-01-24 DIAGNOSIS — R41.3 MEMORY IMPAIRMENT: Primary | ICD-10-CM

## 2023-01-24 DIAGNOSIS — G25.0 BENIGN ESSENTIAL TREMOR: ICD-10-CM

## 2023-01-24 PROCEDURE — 99214 OFFICE O/P EST MOD 30 MIN: CPT | Performed by: NURSE PRACTITIONER

## 2023-01-24 PROCEDURE — 1123F ACP DISCUSS/DSCN MKR DOCD: CPT | Performed by: NURSE PRACTITIONER

## 2023-01-24 NOTE — PATIENT INSTRUCTIONS
Please contact our office around 10/15/23 to get your follow up appointment made for January 2024. Our scheduling phone number is [phone]. Thank you!

## 2023-01-24 NOTE — PROGRESS NOTES
1/24/23    Maria De Jesus Moreira  1950    Chief Complaint   Patient presents with    Memory Loss     Pt presents for f/u of memory issues, pt states she is doing well and has no concerns        History of Present Illness  Moriah Bales is a 74 y.o. female presenting today for follow-up of: expressive aphasia memory issues, balance issues. Work-up for expressive dysphagia at [facility] on 6/18/2022 was negative for stroke. MRI brain was non acute, no concerns for an autoimmune or infectious etiology such as ADEM. 2D echo was without stroke precipitants. EMG was ordered to look for peripheral neuropathy as the cause of her balance issues from sensory ataxia. EMG study was normal. Labs for reversible causes of neuropathy were normal.    Moriah Bales will continue aspirin and statin for stroke prevention. She was ordered PT at last visit to help with her balance. It was recommended that Moriah Bales follow up with her PCP to discuss her anxiety, depression, stress issues which could impair her ability to focus which can affect her memory. She is taking  Zoloft. She lives alone, she manages her finances and medications without difficulty. She cooks, she is never left the stove on or water running. She still drives, she has never gotten lost.    Today, Moriah Bales tells me that she has not had any further episodes of expressive aphasia. Current Outpatient Medications   Medication Sig Dispense Refill    EQ ASPIRIN ADULT LOW DOSE 81 MG EC tablet TAKE 1 TABLET BY MOUTH ONCE DAILY      sertraline (ZOLOFT) 50 MG tablet       atorvastatin (LIPITOR) 40 MG tablet       levothyroxine (SYNTHROID) 88 MCG tablet Take 88 mcg by mouth Daily. No current facility-administered medications for this visit.        Physical Exam:  Constitutional              Weight: well nourishedHeart/Vascular              No cyanosis or clubbing appreciated  Mental Status              Orientation: oriented to person, oriented to place, oriented to problem, and oriented to time              Mood/Affect: appropriate mood and anxious              Memory/Other: recent memory intact, remote memory intact, fund of knowledge intact, attention span normal, and concentration normal  Language              Language: (normal) language, no dysarthria, (normal) articulation, and no dysphasia/aphasia  Cranial Nerves              CN II Right: visual fields appear intact              CN II Left: visual fields appear intact              CN III, IV, VI: EOM no nystagmus, normal pursuit, and extraocular muscle strength normal              CN III: pupil normal size, pupil reactive to light and dark, pupil accomodates, and no ptosis              CN IV: normal              CN VI: normal              CN V Right: normal sensation and muscles of mastication intact              CN V Left: normal sensation and muscles of mastication intact              CN VII Right: normal facial expression              CN VII Left: normal facial expression              CN VIII Right: hearing in tact to normal conversation              CN VIII Left: hearing in tact to normal conversation              CN IX,X: normal palatal movement              CN XI Right: normal sternocleidomastoid and normal trapezius              CN XI Left: normal sternocleidomastoid and normal trapezius              CN XII: no tremors of the tongue, no fasciculation of the tongue, tongue protrudes midline, normal power to left, and normal power to right  Gait and Stance              Gait/Posture: station normal, ambulates independently, and gait normal  Motor/Coordination Exam              General: no bradykinesia, no chorea, no athetosis, no myoclonus, and no dyskinesia left hand with high frequency low amplitude action tremor              Right Upper Extremity: normal motor strength, normal bulk, and normal tone              Left Upper Extremity: normal motor strength, normal bulk, and normal tone              Right Lower Extremity: normal motor strength, normal bulk, and normal tone              Left Lower Extremity: normal motor strength, normal bulk, and normal tone              Coordination: no drift, normal finger-to-nose, normal heel-to-shin, and rapid alternating movements normal moderate sway with Romberg. Reflexes              Reflexes Right: 2/4 biceps, brachioradialis, 1/4 patellar, and achilles               Reflexes Left: 2/4 biceps, brachioradialis, 1/4 patellar, and achilles               Plantar Reflex Right: response downgoing              Plantar Reflex Left: response downgoing              Hoffmans Reflex Right: absent              Hoffmans Reflex Left: absent              Frontal Release Signs: frontal release signs absent  Sensory              Sensation: normal light touch, decreased pinprick length dependent, decreased temperature, normal vibration, difficulties consistently answering questions when testing proprioception. normal DSS, and no neglect      27 Point MMSE Screen:   Orientation (Time): 5   Orientation (Place): /   Learning 3 Objects: 3/3   Attention:    Recall 3 Objects: 2/3   Namin/2   Repitition:    3-Step Command: 3/3   TOTAL:       /76 (Site: Left Upper Arm, Position: Sitting, Cuff Size: Medium Adult)   Pulse 63   Ht 5' 4\" (1.626 m)   Wt 144 lb 3.2 oz (65.4 kg)   SpO2 94%   BMI 24.75 kg/m²     Assessment and Plan     Diagnosis Orders   1. Memory impairment        2. Benign essential tremor        Tee Phelps was seen in neurological follow up in regards to CVA, memory issues, balance issues. She will remain on aspirin and statin for stroke prevention. She has not had any further episodes of expressive aphasia. Her cognitive deficits do not affect her activities of daily living, she scored very well on her MMSE today. I have no concerns for dementia at this time, her memory concerns are more likely related to stress.   I will see her back in a year or sooner if needed. We discussed the importance of modification of stroke risk factors. We discussed the importance of the adherence to secondary stroke preventative medications. We discussed the importance of alerting EMS and presenting to the hospital at the onset of any stroke-like symptoms. Return in about 1 year (around 1/24/2024).     Robert Tran, GIDEON - CNP

## 2023-07-03 ENCOUNTER — OFFICE VISIT (OUTPATIENT)
Dept: ORTHOPEDIC SURGERY | Age: 73
End: 2023-07-03
Payer: MEDICARE

## 2023-07-03 VITALS
BODY MASS INDEX: 23.9 KG/M2 | RESPIRATION RATE: 16 BRPM | WEIGHT: 140 LBS | HEIGHT: 64 IN | HEART RATE: 81 BPM | OXYGEN SATURATION: 97 %

## 2023-07-03 DIAGNOSIS — M17.12 PRIMARY OSTEOARTHRITIS OF LEFT KNEE: Primary | ICD-10-CM

## 2023-07-03 PROCEDURE — 20610 DRAIN/INJ JOINT/BURSA W/O US: CPT | Performed by: PHYSICIAN ASSISTANT

## 2023-07-03 PROCEDURE — 99999 PR OFFICE/OUTPT VISIT,PROCEDURE ONLY: CPT | Performed by: PHYSICIAN ASSISTANT

## 2023-07-03 ASSESSMENT — ENCOUNTER SYMPTOMS
GASTROINTESTINAL NEGATIVE: 1
EYES NEGATIVE: 1
RESPIRATORY NEGATIVE: 1

## 2023-07-03 NOTE — PATIENT INSTRUCTIONS
Continue to weight bear as tolerated  Continue range of motion  Ice and elevate as needed  Tylenol or Motrin for pain  Injection given into the in the left knee  No high impact activities for a least 24-48 hours  Follow up as needed / may get every 3 months      We are committed to providing you the best care possible. If you receive a survey after visiting one of our offices, please take time to share your experience concerning your physician office visit. These surveys are confidential and no health information about you is shared. We are eager to improve for you and we are counting on your feedback to help make that happen.

## 2023-10-02 ENCOUNTER — OFFICE VISIT (OUTPATIENT)
Dept: ORTHOPEDIC SURGERY | Age: 73
End: 2023-10-02
Payer: MEDICARE

## 2023-10-02 VITALS — RESPIRATION RATE: 16 BRPM | WEIGHT: 140 LBS | HEIGHT: 64 IN | BODY MASS INDEX: 23.9 KG/M2

## 2023-10-02 DIAGNOSIS — M17.12 PRIMARY OSTEOARTHRITIS OF LEFT KNEE: Primary | ICD-10-CM

## 2023-10-02 PROCEDURE — 20610 DRAIN/INJ JOINT/BURSA W/O US: CPT | Performed by: PHYSICIAN ASSISTANT

## 2023-10-02 PROCEDURE — 99999 PR OFFICE/OUTPT VISIT,PROCEDURE ONLY: CPT | Performed by: PHYSICIAN ASSISTANT

## 2023-10-02 RX ORDER — TRIAMCINOLONE ACETONIDE 40 MG/ML
40 INJECTION, SUSPENSION INTRA-ARTICULAR; INTRAMUSCULAR ONCE
Status: COMPLETED | OUTPATIENT
Start: 2023-10-02 | End: 2023-10-02

## 2023-10-02 RX ADMIN — TRIAMCINOLONE ACETONIDE 40 MG: 40 INJECTION, SUSPENSION INTRA-ARTICULAR; INTRAMUSCULAR at 10:26

## 2023-10-02 ASSESSMENT — ENCOUNTER SYMPTOMS
EYES NEGATIVE: 1
GASTROINTESTINAL NEGATIVE: 1
RESPIRATORY NEGATIVE: 1

## 2023-10-02 NOTE — PATIENT INSTRUCTIONS
Continue to weight bear as tolerated  Continue range of motion  Ice and elevate as needed  Tylenol or Motrin for pain  Injection given into the left knee  No high impact activities for a least 24-48 hours  Follow up in 3 months    We are committed to providing you the best care possible. If you receive a survey after visiting one of our offices, please take time to share your experience concerning your physician office visit. These surveys are confidential and no health information about you is shared. We are eager to improve for you and we are counting on your feedback to help make that happen.

## 2024-01-03 ENCOUNTER — OFFICE VISIT (OUTPATIENT)
Dept: ORTHOPEDIC SURGERY | Age: 74
End: 2024-01-03
Payer: MEDICARE

## 2024-01-03 VITALS
OXYGEN SATURATION: 98 % | HEART RATE: 88 BPM | HEIGHT: 64 IN | RESPIRATION RATE: 16 BRPM | BODY MASS INDEX: 23.73 KG/M2 | WEIGHT: 139 LBS

## 2024-01-03 DIAGNOSIS — M17.12 PRIMARY OSTEOARTHRITIS OF LEFT KNEE: Primary | ICD-10-CM

## 2024-01-03 PROCEDURE — 99999 PR OFFICE/OUTPT VISIT,PROCEDURE ONLY: CPT | Performed by: PHYSICIAN ASSISTANT

## 2024-01-03 PROCEDURE — 20610 DRAIN/INJ JOINT/BURSA W/O US: CPT | Performed by: PHYSICIAN ASSISTANT

## 2024-01-03 RX ORDER — TRIAMCINOLONE ACETONIDE 40 MG/ML
40 INJECTION, SUSPENSION INTRA-ARTICULAR; INTRAMUSCULAR ONCE
Status: COMPLETED | OUTPATIENT
Start: 2024-01-03 | End: 2024-01-03

## 2024-01-03 RX ADMIN — TRIAMCINOLONE ACETONIDE 40 MG: 40 INJECTION, SUSPENSION INTRA-ARTICULAR; INTRAMUSCULAR at 10:40

## 2024-01-03 ASSESSMENT — ENCOUNTER SYMPTOMS
EYES NEGATIVE: 1
GASTROINTESTINAL NEGATIVE: 1
RESPIRATORY NEGATIVE: 1

## 2024-01-03 NOTE — PATIENT INSTRUCTIONS
Continue weight-bearing as tolerated.  Continue range of motion exercises as instructed.  Ice and elevate as needed.  Tylenol or Motrin for pain.  Injection given into the left knee.  Follow up in 3 months    We are committed to providing you the best care possible.  If you receive a survey after visiting one of our offices, please take time to share your experience concerning your physician office visit.  These surveys are confidential and no health information about you is shared.  We are eager to improve for you and we are counting on your feedback to help make that happen.

## 2024-01-03 NOTE — PROGRESS NOTES
patient.  Risks include, but are not limited to, increased pain, drug reaction, infection, bleeding, lack of improvement, neurovascular injury, and increased blood sugar levels.  The patient understood and all of their questions were answered.    The Left knee was prepped with alcohol then a 25 gauge needle was advanced into the inferior-medial joint without difficulty.  The joint was then injected with 1 ml 1% lidocaine, 1ml of Kenalog (40 mg/ml), 1ml 0.5% bupivacaine the injection site was cleansed with isopropyl alcohol and a band-aid was placed.      Complications:  None, the patient tolerated the procedure well.    Instructions:  The patient was advised to rest the knee and decrease activity for the next 24 to 48 hours. May use prescription or OTC pain relievers as needed for any post-injection pain as well as ice.

## 2024-04-03 ENCOUNTER — OFFICE VISIT (OUTPATIENT)
Dept: ORTHOPEDIC SURGERY | Age: 74
End: 2024-04-03
Payer: MEDICARE

## 2024-04-03 VITALS
BODY MASS INDEX: 23.56 KG/M2 | HEIGHT: 64 IN | OXYGEN SATURATION: 97 % | RESPIRATION RATE: 16 BRPM | HEART RATE: 82 BPM | WEIGHT: 138 LBS

## 2024-04-03 DIAGNOSIS — M17.12 PRIMARY OSTEOARTHRITIS OF LEFT KNEE: Primary | ICD-10-CM

## 2024-04-03 PROCEDURE — 20610 DRAIN/INJ JOINT/BURSA W/O US: CPT | Performed by: PHYSICIAN ASSISTANT

## 2024-04-03 RX ORDER — TRIAMCINOLONE ACETONIDE 40 MG/ML
40 INJECTION, SUSPENSION INTRA-ARTICULAR; INTRAMUSCULAR ONCE
Status: COMPLETED | OUTPATIENT
Start: 2024-04-03 | End: 2024-04-03

## 2024-04-03 RX ADMIN — TRIAMCINOLONE ACETONIDE 40 MG: 40 INJECTION, SUSPENSION INTRA-ARTICULAR; INTRAMUSCULAR at 10:45

## 2024-04-03 ASSESSMENT — ENCOUNTER SYMPTOMS
GASTROINTESTINAL NEGATIVE: 1
EYES NEGATIVE: 1
RESPIRATORY NEGATIVE: 1

## 2024-04-03 NOTE — PROGRESS NOTES
Patient is in the office wanting repeat injection for the left knee. Last injection for the left knee was on 01/03/2024. Patient states that the injection didn't last long at all. May 1-2 months. Patient is aware that she is bone on bone, but denies wanting TKA.

## 2024-04-03 NOTE — PROGRESS NOTES
Review of Systems   Constitutional: Negative.    HENT: Negative.     Eyes: Negative.    Respiratory: Negative.     Cardiovascular: Negative.    Gastrointestinal: Negative.    Genitourinary: Negative.    Musculoskeletal:  Positive for arthralgias, joint swelling and myalgias.   Skin: Negative.  Negative for rash and wound.   Neurological: Negative.    Psychiatric/Behavioral: Negative.           HPI:  Elvia Kay is a 74 y.o. female that again returns to the office today with persistent left knee pain.      Past Medical History:   Diagnosis Date    Hyperlipidemia     Hypertension     Thyroid disease        Past Surgical History:   Procedure Laterality Date    CHOLECYSTECTOMY         No family history on file.    Social History     Socioeconomic History    Marital status: Single     Spouse name: None    Number of children: None    Years of education: None    Highest education level: None   Tobacco Use    Smoking status: Never    Smokeless tobacco: Never   Substance and Sexual Activity    Alcohol use: Yes     Comment: rarely    Drug use: No       Current Outpatient Medications   Medication Sig Dispense Refill    EQ ASPIRIN ADULT LOW DOSE 81 MG EC tablet TAKE 1 TABLET BY MOUTH ONCE DAILY      sertraline (ZOLOFT) 50 MG tablet       atorvastatin (LIPITOR) 40 MG tablet       levothyroxine (SYNTHROID) 88 MCG tablet Take 1 tablet by mouth Daily       No current facility-administered medications for this visit.       No Known Allergies    Review of Systems:  See above      Physical Exam:   Pulse 82   Resp 16   Ht 1.626 m (5' 4\")   Wt 62.6 kg (138 lb)   SpO2 97%   BMI 23.69 kg/m²        Gait is Normal. The patient can bear weight on the injured extremity.       Gen/Psych:Examination reveals a pleasant individual in no acute distress. The patient is oriented to time, place and person.  The patient's mood and affect are appropriate.  Patient appears well nourished. Body habitus is normal     Lymph:  no lymphedema in

## 2024-04-03 NOTE — PATIENT INSTRUCTIONS
Continue weight-bearing as tolerated.  Continue range of motion exercises as instructed.  Ice and elevate as needed.  Tylenol or Motrin for pain.  Injection given into the left knee.  Follow up in 3 months.    We are committed to providing you the best care possible.  If you receive a survey after visiting one of our offices, please take time to share your experience concerning your physician office visit.  These surveys are confidential and no health information about you is shared.  We are eager to improve for you and we are counting on your feedback to help make that happen.

## 2024-05-21 ENCOUNTER — OFFICE VISIT (OUTPATIENT)
Dept: ORTHOPEDIC SURGERY | Age: 74
End: 2024-05-21
Payer: MEDICARE

## 2024-05-21 VITALS
OXYGEN SATURATION: 97 % | BODY MASS INDEX: 23.56 KG/M2 | RESPIRATION RATE: 14 BRPM | WEIGHT: 138 LBS | HEIGHT: 64 IN | HEART RATE: 76 BPM

## 2024-05-21 DIAGNOSIS — M17.11 ARTHRITIS OF RIGHT KNEE: Primary | ICD-10-CM

## 2024-05-21 PROCEDURE — 20610 DRAIN/INJ JOINT/BURSA W/O US: CPT

## 2024-05-21 PROCEDURE — 99213 OFFICE O/P EST LOW 20 MIN: CPT

## 2024-05-21 PROCEDURE — 1123F ACP DISCUSS/DSCN MKR DOCD: CPT

## 2024-05-21 RX ORDER — AMLODIPINE BESYLATE 5 MG/1
5 TABLET ORAL DAILY
COMMUNITY

## 2024-05-21 RX ORDER — TRIAMCINOLONE ACETONIDE 40 MG/ML
40 INJECTION, SUSPENSION INTRA-ARTICULAR; INTRAMUSCULAR ONCE
Status: COMPLETED | OUTPATIENT
Start: 2024-05-21 | End: 2024-05-21

## 2024-05-21 RX ADMIN — TRIAMCINOLONE ACETONIDE 40 MG: 40 INJECTION, SUSPENSION INTRA-ARTICULAR; INTRAMUSCULAR at 11:39

## 2024-05-21 NOTE — PATIENT INSTRUCTIONS
Continue weight-bearing as tolerated.  Continue range of motion exercises as instructed.  Ice and elevate as needed.  Tylenol or Motrin for pain.  Injection given into the right knee.  Follow up as needed.    We are committed to providing you the best care possible.  If you receive a survey after visiting one of our offices, please take time to share your experience concerning your physician office visit.  These surveys are confidential and no health information about you is shared.  We are eager to improve for you and we are counting on your feedback to help make that happen.

## 2024-06-04 ASSESSMENT — ENCOUNTER SYMPTOMS
RHINORRHEA: 0
COUGH: 0
FACIAL SWELLING: 0
NAUSEA: 0
BACK PAIN: 0
SHORTNESS OF BREATH: 0

## 2024-06-04 NOTE — PROGRESS NOTES
procedure well with no complications.      Continue home exercise program and weight loss.  Follow-up in 6 weeks for check of progress     Assessment and Plan  1.  Right knee arthritis    Continue weight-bearing as tolerated.  Continue range of motion exercises as instructed.  Ice and elevate as needed.  Tylenol or Motrin for pain.  Injection given into the right knee.  Patient tolerated the procedure well as described above.  Follow up as needed.    Electronically signed by Ab Hairston PA-C on 6/4/2024 at 11:42 AM

## 2024-07-03 ENCOUNTER — OFFICE VISIT (OUTPATIENT)
Dept: ORTHOPEDIC SURGERY | Age: 74
End: 2024-07-03
Payer: MEDICARE

## 2024-07-03 VITALS — OXYGEN SATURATION: 97 % | HEART RATE: 87 BPM | TEMPERATURE: 97.2 F

## 2024-07-03 DIAGNOSIS — M17.12 PRIMARY OSTEOARTHRITIS OF LEFT KNEE: Primary | ICD-10-CM

## 2024-07-03 PROCEDURE — 20610 DRAIN/INJ JOINT/BURSA W/O US: CPT | Performed by: PHYSICIAN ASSISTANT

## 2024-07-03 RX ORDER — TRIAMCINOLONE ACETONIDE 40 MG/ML
40 INJECTION, SUSPENSION INTRA-ARTICULAR; INTRAMUSCULAR ONCE
Status: COMPLETED | OUTPATIENT
Start: 2024-07-03 | End: 2024-07-03

## 2024-07-03 RX ADMIN — TRIAMCINOLONE ACETONIDE 40 MG: 40 INJECTION, SUSPENSION INTRA-ARTICULAR; INTRAMUSCULAR at 11:08

## 2024-07-03 NOTE — PATIENT INSTRUCTIONS
Follow up in 3 mos for the left knee injection given on 7/3/24   Follow up when needed for a right knee injection

## 2024-08-01 ASSESSMENT — ENCOUNTER SYMPTOMS
RESPIRATORY NEGATIVE: 1
EYES NEGATIVE: 1
GASTROINTESTINAL NEGATIVE: 1

## 2024-08-01 NOTE — PROGRESS NOTES
Patient seen in office today for:    Left knee injection given 4/3/24 right given on 5/21/24   Osteoarthritis in left knee and arthritis in right knee    Patient reports 8 /10 pain.  RICE and medication are effective to alleviate pain and reduce swelling.   Pain also alleviated by: OTC Medication   Pain worsened by: Patient reports painful ROM & weight bearing.     Patient is interested in injection today    
       Gait is Normal. The patient can bear weight on the injured extremity.       Gen/Psych:Examination reveals a pleasant individual in no acute distress. The patient is oriented to time, place and person.  The patient's mood and affect are appropriate.  Patient appears well nourished. Body habitus is normal     Lymph:  no lymphedema in bilateral lower extremities     Skin intact in bilateral lower extremities with no ulcerations, lesions, rash, erythema.     Vascular: There are mild varicosities in bilateral lower extremities, sensation intact to light touch over bilateral lower extremities.      left leg/knee exam:  Leg alignment:     neutral  Quadriceps/hamstring atrophy:   no  Knee effusion:    no   Knee erythema:   no  ROM:     0-125 degrees  Varus laxity at 0 and 30 deg's: no  Valguslaxity at 0 and 30 deg's: no  Recurvatum:    no  Tenderness at:   Medial joint line    Bilateral Knee strength is 5/5 flexion and extension  There is + L2-S1 motor and sensory function in bilateral lower extremities    Outside record review: office notes and x-rays reviewed    Imaging studies:  X-rays of the left knee show bone-on-bone articulation of the medial compartment of the left knee.  Impression:     Diagnosis Orders   1. Primary osteoarthritis of left knee  triamcinolone acetonide (KENALOG-40) injection 40 mg    01225 - NH DRAIN/INJECT LARGE JOINT/BURSA                  Plan:    Patient Instructions   Follow up in 3 mos for the left knee injection given on 7/3/24   Follow up when needed for a right knee injection     Left knee injection procedure note    Pre-procedure diagnosis:  Left knee DJD    Post-procedure diagnosis:  same    Procedure: The planned procedure/risks/benefits/alternatives were discussed with the patient.  Risks include, but are not limited to, increased pain, drug reaction, infection, bleeding, lack of improvement, neurovascular injury, and increased blood sugar levels.  The patient understood and all

## 2024-10-07 ENCOUNTER — PROCEDURE VISIT (OUTPATIENT)
Dept: ORTHOPEDIC SURGERY | Age: 74
End: 2024-10-07
Payer: COMMERCIAL

## 2024-10-07 VITALS — TEMPERATURE: 97.8 F | HEART RATE: 80 BPM | OXYGEN SATURATION: 97 %

## 2024-10-07 DIAGNOSIS — M17.11 PRIMARY OSTEOARTHRITIS OF RIGHT KNEE: ICD-10-CM

## 2024-10-07 DIAGNOSIS — M17.12 PRIMARY OSTEOARTHRITIS OF LEFT KNEE: Primary | ICD-10-CM

## 2024-10-07 PROCEDURE — 99212 OFFICE O/P EST SF 10 MIN: CPT | Performed by: PHYSICIAN ASSISTANT

## 2024-10-07 PROCEDURE — 20610 DRAIN/INJ JOINT/BURSA W/O US: CPT | Performed by: PHYSICIAN ASSISTANT

## 2024-10-07 PROCEDURE — 1123F ACP DISCUSS/DSCN MKR DOCD: CPT | Performed by: PHYSICIAN ASSISTANT

## 2024-10-07 RX ORDER — TRIAMCINOLONE ACETONIDE 40 MG/ML
40 INJECTION, SUSPENSION INTRA-ARTICULAR; INTRAMUSCULAR ONCE
Status: COMPLETED | OUTPATIENT
Start: 2024-10-07 | End: 2024-10-07

## 2024-10-07 RX ADMIN — TRIAMCINOLONE ACETONIDE 40 MG: 40 INJECTION, SUSPENSION INTRA-ARTICULAR; INTRAMUSCULAR at 11:21

## 2024-10-07 RX ADMIN — TRIAMCINOLONE ACETONIDE 40 MG: 40 INJECTION, SUSPENSION INTRA-ARTICULAR; INTRAMUSCULAR at 11:19

## 2024-10-07 ASSESSMENT — ENCOUNTER SYMPTOMS
RESPIRATORY NEGATIVE: 1
GASTROINTESTINAL NEGATIVE: 1
EYES NEGATIVE: 1

## 2024-10-07 NOTE — PROGRESS NOTES
Review of Systems   Constitutional: Negative.    HENT: Negative.     Eyes: Negative.    Respiratory: Negative.     Cardiovascular: Negative.    Gastrointestinal: Negative.    Genitourinary: Negative.    Musculoskeletal:  Positive for arthralgias, joint swelling and myalgias.   Skin: Negative.  Negative for rash and wound.   Neurological: Negative.    Psychiatric/Behavioral: Negative.         HPI: Elvia Kay is a 74-year-old female well-known to this office for arthritic knees for which she receives injections periodically.  Today she would like to get both knees injected as both her left and her right knee are bothering her significantly today.  She rates her pain at a 7/10.      Past Medical History:   Diagnosis Date    Hyperlipidemia     Hypertension     Thyroid disease        Past Surgical History:   Procedure Laterality Date    CHOLECYSTECTOMY         No family history on file.    Social History     Socioeconomic History    Marital status: Single     Spouse name: None    Number of children: None    Years of education: None    Highest education level: None   Tobacco Use    Smoking status: Never    Smokeless tobacco: Never   Substance and Sexual Activity    Alcohol use: Yes     Comment: rarely    Drug use: No     Social Determinants of Health     Food Insecurity: No Food Insecurity (1/27/2024)    Received from [facility]    Hunger Vital Sign     Worried About Running Out of Food in the Last Year: Never true     Ran Out of Food in the Last Year: Never true   Transportation Needs: No Transportation Needs (1/27/2024)    Received from [employer] [facility]    PRAPARE - Transportation     Lack of Transportation (Medical): No     Lack of Transportation (Non-Medical): No   Intimate Partner Violence: Not At Risk (1/27/2024)    Received from [facility]    Humiliation, Afraid, Rape, and Kick questionnaire     Fear of Current or Ex-Partner: No     Emotionally Abused: No

## 2024-10-07 NOTE — PROGRESS NOTES
Patient seen in office today for:  FU left knee injection given on 7/3/24  She would like Both knees done today  the pain is in the back part of her knees   No falls or injuries since last visit   Patient reports 7/10 pain.  RICE and medication are effective to alleviate pain and reduce swelling.   Pain also alleviated by: OTC meds as needed  Pain worsened by: Patient reports painful ROM & weight bearing.       Patient is interested in injection today

## 2024-10-07 NOTE — PATIENT INSTRUCTIONS
Continue weight-bearing as tolerated.  Continue range of motion exercises as instructed.  Ice and elevate as needed.  Tylenol or Motrin for pain.  Bilateral knee injections given today 10/7/24   Follow up in 3 months    We are committed to providing you the best care possible.  If you receive a survey after visiting one of our offices, please take time to share your experience concerning your physician office visit.  These surveys are confidential and no health information about you is shared.  We are eager to improve for you and we are counting on your feedback to help make that happen.

## 2024-10-24 ENCOUNTER — APPOINTMENT (OUTPATIENT)
Dept: CT IMAGING | Age: 74
End: 2024-10-24
Payer: COMMERCIAL

## 2024-10-24 ENCOUNTER — HOSPITAL ENCOUNTER (OUTPATIENT)
Age: 74
Setting detail: OBSERVATION
Discharge: HOME | End: 2024-10-28
Attending: STUDENT IN AN ORGANIZED HEALTH CARE EDUCATION/TRAINING PROGRAM
Payer: COMMERCIAL

## 2024-10-24 ENCOUNTER — APPOINTMENT (OUTPATIENT)
Dept: GENERAL RADIOLOGY | Age: 74
End: 2024-10-24
Payer: COMMERCIAL

## 2024-10-24 DIAGNOSIS — R42 DIZZINESS: Primary | ICD-10-CM

## 2024-10-24 DIAGNOSIS — R11.2 NAUSEA AND VOMITING, UNSPECIFIED VOMITING TYPE: ICD-10-CM

## 2024-10-24 DIAGNOSIS — E86.0 DEHYDRATION: ICD-10-CM

## 2024-10-24 LAB
ALBUMIN SERPL-MCNC: 4.5 G/DL (ref 3.4–5)
ALBUMIN/GLOB SERPL: 1.9 {RATIO} (ref 1.1–2.2)
ALP SERPL-CCNC: 66 U/L (ref 40–129)
ALT SERPL-CCNC: 28 U/L (ref 10–40)
ANION GAP SERPL CALCULATED.3IONS-SCNC: 17 MMOL/L (ref 9–17)
AST SERPL-CCNC: 20 U/L (ref 15–37)
BASOPHILS # BLD: 0.04 K/UL
BASOPHILS NFR BLD: 0 % (ref 0–1)
BILIRUB SERPL-MCNC: 0.6 MG/DL (ref 0–1)
BUN SERPL-MCNC: 15 MG/DL (ref 7–20)
CALCIUM SERPL-MCNC: 9.7 MG/DL (ref 8.3–10.6)
CHLORIDE SERPL-SCNC: 103 MMOL/L (ref 99–110)
CO2 SERPL-SCNC: 20 MMOL/L (ref 21–32)
CREAT SERPL-MCNC: 0.6 MG/DL (ref 0.6–1.2)
EOSINOPHIL # BLD: 0 K/UL
EOSINOPHILS RELATIVE PERCENT: 0 % (ref 0–3)
ERYTHROCYTE [DISTWIDTH] IN BLOOD BY AUTOMATED COUNT: 12.1 % (ref 11.7–14.9)
GFR, ESTIMATED: >90 ML/MIN/1.73M2
GLUCOSE BLD-MCNC: 108 MG/DL (ref 74–99)
GLUCOSE SERPL-MCNC: 112 MG/DL (ref 74–99)
HCT VFR BLD AUTO: 44.9 % (ref 37–47)
HGB BLD-MCNC: 15.3 G/DL (ref 12.5–16)
IMM GRANULOCYTES # BLD AUTO: 0.09 K/UL
IMM GRANULOCYTES NFR BLD: 1 %
INR PPP: 1
LACTATE BLDV-SCNC: 3 MMOL/L (ref 0.4–2)
LYMPHOCYTES NFR BLD: 1.44 K/UL
LYMPHOCYTES RELATIVE PERCENT: 8 % (ref 24–44)
MCH RBC QN AUTO: 32 PG (ref 27–31)
MCHC RBC AUTO-ENTMCNC: 34.1 G/DL (ref 32–36)
MCV RBC AUTO: 93.9 FL (ref 78–100)
MONOCYTES NFR BLD: 1.18 K/UL
MONOCYTES NFR BLD: 6 % (ref 0–4)
NEUTROPHILS NFR BLD: 86 % (ref 36–66)
NEUTS SEG NFR BLD: 16.19 K/UL
PLATELET # BLD AUTO: 350 K/UL (ref 140–440)
PMV BLD AUTO: 9.9 FL (ref 7.5–11.1)
POTASSIUM SERPL-SCNC: 3.5 MMOL/L (ref 3.5–5.1)
PROCALCITONIN SERPL-MCNC: 0.03 NG/ML
PROT SERPL-MCNC: 6.7 G/DL (ref 6.4–8.2)
PROTHROMBIN TIME: 13 SEC (ref 11.7–14.5)
RBC # BLD AUTO: 4.78 M/UL (ref 4.2–5.4)
SODIUM SERPL-SCNC: 140 MMOL/L (ref 136–145)
TROPONIN I SERPL HS-MCNC: 7 NG/L (ref 0–14)
TROPONIN I SERPL HS-MCNC: 8 NG/L (ref 0–14)
TSH SERPL DL<=0.05 MIU/L-ACNC: 1.03 UIU/ML (ref 0.27–4.2)
WBC OTHER # BLD: 18.9 K/UL (ref 4–10.5)

## 2024-10-24 PROCEDURE — 96361 HYDRATE IV INFUSION ADD-ON: CPT

## 2024-10-24 PROCEDURE — 87040 BLOOD CULTURE FOR BACTERIA: CPT

## 2024-10-24 PROCEDURE — 71260 CT THORAX DX C+: CPT

## 2024-10-24 PROCEDURE — 85610 PROTHROMBIN TIME: CPT

## 2024-10-24 PROCEDURE — 2580000003 HC RX 258

## 2024-10-24 PROCEDURE — 70450 CT HEAD/BRAIN W/O DYE: CPT

## 2024-10-24 PROCEDURE — 85025 COMPLETE CBC W/AUTO DIFF WBC: CPT

## 2024-10-24 PROCEDURE — 84443 ASSAY THYROID STIM HORMONE: CPT

## 2024-10-24 PROCEDURE — 83605 ASSAY OF LACTIC ACID: CPT

## 2024-10-24 PROCEDURE — 6360000002 HC RX W HCPCS

## 2024-10-24 PROCEDURE — 6360000004 HC RX CONTRAST MEDICATION

## 2024-10-24 PROCEDURE — 84484 ASSAY OF TROPONIN QUANT: CPT

## 2024-10-24 PROCEDURE — 96375 TX/PRO/DX INJ NEW DRUG ADDON: CPT

## 2024-10-24 PROCEDURE — 80053 COMPREHEN METABOLIC PANEL: CPT

## 2024-10-24 PROCEDURE — 96374 THER/PROPH/DIAG INJ IV PUSH: CPT

## 2024-10-24 PROCEDURE — 6370000000 HC RX 637 (ALT 250 FOR IP): Performed by: STUDENT IN AN ORGANIZED HEALTH CARE EDUCATION/TRAINING PROGRAM

## 2024-10-24 PROCEDURE — 99285 EMERGENCY DEPT VISIT HI MDM: CPT

## 2024-10-24 PROCEDURE — 82962 GLUCOSE BLOOD TEST: CPT

## 2024-10-24 PROCEDURE — 6370000000 HC RX 637 (ALT 250 FOR IP)

## 2024-10-24 PROCEDURE — G0378 HOSPITAL OBSERVATION PER HR: HCPCS

## 2024-10-24 PROCEDURE — 93005 ELECTROCARDIOGRAM TRACING: CPT

## 2024-10-24 PROCEDURE — 84145 PROCALCITONIN (PCT): CPT

## 2024-10-24 PROCEDURE — 71045 X-RAY EXAM CHEST 1 VIEW: CPT

## 2024-10-24 PROCEDURE — 86140 C-REACTIVE PROTEIN: CPT

## 2024-10-24 PROCEDURE — 70498 CT ANGIOGRAPHY NECK: CPT

## 2024-10-24 RX ORDER — IOPAMIDOL 755 MG/ML
75 INJECTION, SOLUTION INTRAVASCULAR
Status: COMPLETED | OUTPATIENT
Start: 2024-10-24 | End: 2024-10-24

## 2024-10-24 RX ORDER — ENOXAPARIN SODIUM 100 MG/ML
40 INJECTION SUBCUTANEOUS DAILY
Status: DISCONTINUED | OUTPATIENT
Start: 2024-10-25 | End: 2024-10-28 | Stop reason: HOSPADM

## 2024-10-24 RX ORDER — AMLODIPINE BESYLATE 5 MG/1
5 TABLET ORAL DAILY
Status: DISCONTINUED | OUTPATIENT
Start: 2024-10-25 | End: 2024-10-28 | Stop reason: HOSPADM

## 2024-10-24 RX ORDER — ASPIRIN 81 MG/1
81 TABLET, CHEWABLE ORAL DAILY
Status: DISCONTINUED | OUTPATIENT
Start: 2024-10-25 | End: 2024-10-28 | Stop reason: HOSPADM

## 2024-10-24 RX ORDER — METOCLOPRAMIDE HYDROCHLORIDE 5 MG/ML
10 INJECTION INTRAMUSCULAR; INTRAVENOUS ONCE
Status: COMPLETED | OUTPATIENT
Start: 2024-10-24 | End: 2024-10-24

## 2024-10-24 RX ORDER — ASPIRIN 300 MG/1
300 SUPPOSITORY RECTAL DAILY
Status: DISCONTINUED | OUTPATIENT
Start: 2024-10-25 | End: 2024-10-24

## 2024-10-24 RX ORDER — MECLIZINE HYDROCHLORIDE 25 MG/1
25 TABLET ORAL ONCE
Status: COMPLETED | OUTPATIENT
Start: 2024-10-24 | End: 2024-10-24

## 2024-10-24 RX ORDER — 0.9 % SODIUM CHLORIDE 0.9 %
1000 INTRAVENOUS SOLUTION INTRAVENOUS ONCE
Status: COMPLETED | OUTPATIENT
Start: 2024-10-24 | End: 2024-10-24

## 2024-10-24 RX ORDER — ONDANSETRON 2 MG/ML
4 INJECTION INTRAMUSCULAR; INTRAVENOUS EVERY 6 HOURS PRN
Status: DISCONTINUED | OUTPATIENT
Start: 2024-10-24 | End: 2024-10-28 | Stop reason: HOSPADM

## 2024-10-24 RX ORDER — DIAZEPAM 5 MG/1
5 TABLET ORAL ONCE
Status: COMPLETED | OUTPATIENT
Start: 2024-10-24 | End: 2024-10-24

## 2024-10-24 RX ORDER — LEVOTHYROXINE SODIUM 88 UG/1
88 TABLET ORAL DAILY
Status: DISCONTINUED | OUTPATIENT
Start: 2024-10-25 | End: 2024-10-28 | Stop reason: HOSPADM

## 2024-10-24 RX ORDER — ATORVASTATIN CALCIUM 40 MG/1
80 TABLET, FILM COATED ORAL NIGHTLY
Status: DISCONTINUED | OUTPATIENT
Start: 2024-10-24 | End: 2024-10-28 | Stop reason: HOSPADM

## 2024-10-24 RX ORDER — DIPHENHYDRAMINE HYDROCHLORIDE 50 MG/ML
25 INJECTION INTRAMUSCULAR; INTRAVENOUS ONCE
Status: COMPLETED | OUTPATIENT
Start: 2024-10-24 | End: 2024-10-24

## 2024-10-24 RX ORDER — 0.9 % SODIUM CHLORIDE 0.9 %
500 INTRAVENOUS SOLUTION INTRAVENOUS ONCE
Status: COMPLETED | OUTPATIENT
Start: 2024-10-24 | End: 2024-10-24

## 2024-10-24 RX ADMIN — DIAZEPAM 5 MG: 5 TABLET ORAL at 23:20

## 2024-10-24 RX ADMIN — METOCLOPRAMIDE HYDROCHLORIDE 10 MG: 5 INJECTION, SOLUTION INTRAMUSCULAR; INTRAVENOUS at 19:11

## 2024-10-24 RX ADMIN — DIPHENHYDRAMINE HYDROCHLORIDE 25 MG: 50 INJECTION, SOLUTION INTRAMUSCULAR; INTRAVENOUS at 19:11

## 2024-10-24 RX ADMIN — MECLIZINE HYDROCHLORIDE 25 MG: 25 TABLET ORAL at 16:35

## 2024-10-24 RX ADMIN — SODIUM CHLORIDE 500 ML: 9 INJECTION, SOLUTION INTRAVENOUS at 19:12

## 2024-10-24 RX ADMIN — ONDANSETRON 4 MG: 2 INJECTION INTRAMUSCULAR; INTRAVENOUS at 16:36

## 2024-10-24 RX ADMIN — IOPAMIDOL 75 ML: 755 INJECTION, SOLUTION INTRAVENOUS at 18:20

## 2024-10-24 RX ADMIN — SODIUM CHLORIDE 1000 ML: 9 INJECTION, SOLUTION INTRAVENOUS at 21:52

## 2024-10-24 RX ADMIN — ATORVASTATIN CALCIUM 80 MG: 40 TABLET, FILM COATED ORAL at 23:20

## 2024-10-24 ASSESSMENT — PAIN DESCRIPTION - ORIENTATION: ORIENTATION: MID

## 2024-10-24 ASSESSMENT — PAIN SCALES - GENERAL
PAINLEVEL_OUTOF10: 7
PAINLEVEL_OUTOF10: 9

## 2024-10-24 ASSESSMENT — PAIN DESCRIPTION - LOCATION
LOCATION: HEAD
LOCATION: HEAD

## 2024-10-24 ASSESSMENT — LIFESTYLE VARIABLES
HOW OFTEN DO YOU HAVE A DRINK CONTAINING ALCOHOL: NEVER
HOW MANY STANDARD DRINKS CONTAINING ALCOHOL DO YOU HAVE ON A TYPICAL DAY: PATIENT DOES NOT DRINK

## 2024-10-24 ASSESSMENT — PAIN DESCRIPTION - DESCRIPTORS: DESCRIPTORS: HEAVINESS

## 2024-10-24 ASSESSMENT — PAIN - FUNCTIONAL ASSESSMENT: PAIN_FUNCTIONAL_ASSESSMENT: PREVENTS OR INTERFERES WITH MANY ACTIVE NOT PASSIVE ACTIVITIES

## 2024-10-24 ASSESSMENT — PAIN DESCRIPTION - PAIN TYPE: TYPE: ACUTE PAIN

## 2024-10-24 NOTE — ED PROVIDER NOTES
THIS IS MY CAROLINE SUPERVISORY AND SHARED VISIT NOTE    I independently examined and evaluated Elvia Kay.    In brief, female with prior history of BPPV who presents for the acute onset of the sensation as if things spin around the evening before presentation, with a ene worsening around 9 AM on 10/20/2024.    Focused exam revealed strength 5 out of 5, symmetric sensation, no cranial nerve deficit, normal extraocular movements, pupils symmetric and reactive at 5 mm, bilateral horizontal nystagmus.  Negative test of skew.  mild ataxia bilaterally, symmetric.  Normal speech.        CC/HPI Summary, DDx, ED Course, and Reassessment:   Patient presenting for vertigo, likely BPPV given severe positional component and the absence of other neurologic abnormalities.  Will treat with meclizine and ondansetron, understand the need of further interventions depending on clinical course.  Will discharge if there is improvement, and workup with CT and CTA if there is not.    7:25 PM  Patient persistently symptomatic.  Still with no other abnormalities in the physical exam.  Refractory to meclizine, managed with metoclopramide and diphenhydramine, as well as with ondansetron.  Imaging with CTA of the head and neck with no hemodynamically significant vessel stenosis, and CT brain without contrast with no acute abnormalities.  Will continue medication management and reassess, anticipating admission for further management and workup of posterior circulation CVA as needed, versus discharge with medical management for peripheral vertigo.    7:55 PM  Patient persistently symptomatic.  Elevated lactate, likely in the setting of dehydration due to continue GI losses.  No sources for infection have been identified.  Patient will be given more IV fluids, and admitted for continued management and workup of possible posterior circulation stroke.    History from : Patient    Limitations to history : None    Patient was given the 
DEPARTMENT COURSE and DIFFERENTIAL DIAGNOSIS/MDM:   Vitals:    Vitals:    10/24/24 2030 10/24/24 2100 10/24/24 2245 10/25/24 0000   BP: (!) 149/71 138/71 (!) 144/63 (!) 162/70   Pulse: 85 91 87 82   Resp:   23 17   Temp:   98.2 °F (36.8 °C) 97.9 °F (36.6 °C)   TempSrc:   Oral Oral   SpO2:   97% 97%   Weight:   66 kg (145 lb 8.1 oz)    Height:   1.626 m (5' 4\")        Patient was given the following medications:  Medications   ondansetron (ZOFRAN) injection 4 mg (4 mg IntraVENous Given 10/24/24 1636)   enoxaparin (LOVENOX) injection 40 mg (has no administration in time range)   atorvastatin (LIPITOR) tablet 80 mg (80 mg Oral Given 10/24/24 2320)   aspirin chewable tablet 81 mg (has no administration in time range)   amLODIPine (NORVASC) tablet 5 mg ( Oral Automatically Held 10/28/24 0900)   levothyroxine (SYNTHROID) tablet 88 mcg (has no administration in time range)   sertraline (ZOLOFT) tablet 50 mg (has no administration in time range)   meclizine (ANTIVERT) tablet 25 mg (25 mg Oral Given 10/24/24 1635)   iopamidol (ISOVUE-370) 76 % injection 75 mL (75 mLs IntraVENous Given 10/24/24 1820)   metoclopramide (REGLAN) injection 10 mg (10 mg IntraVENous Given 10/24/24 1911)   diphenhydrAMINE (BENADRYL) injection 25 mg (25 mg IntraVENous Given 10/24/24 1911)   sodium chloride 0.9 % bolus 500 mL (0 mLs IntraVENous Stopped 10/24/24 2057)   sodium chloride 0.9 % bolus 1,000 mL (0 mLs IntraVENous Stopped 10/24/24 2355)   diazePAM (VALIUM) tablet 5 mg (5 mg Oral Given 10/24/24 2320)       ED Course as of 10/25/24 0010   Thu Oct 24, 2024   1610 EKG with a rate of 77, sinus rhythm, short OH, otherwise normal intervals, no ST segment elevation or T wave abnormalities to suggest ischemia. [SC]   1836 Patient continues to have nausea vomiting dizziness Reglan Benadryl 500ml NS added [NS]      ED Course User Index  [NS] Karena Valle, APRN - CNP  [SC] Ra Laurent MD        Is this patient to be included in the

## 2024-10-25 ENCOUNTER — APPOINTMENT (OUTPATIENT)
Dept: MRI IMAGING | Age: 74
End: 2024-10-25
Payer: COMMERCIAL

## 2024-10-25 LAB
ANION GAP SERPL CALCULATED.3IONS-SCNC: 10 MMOL/L (ref 9–17)
BILIRUB UR QL STRIP: NEGATIVE
BUN SERPL-MCNC: 14 MG/DL (ref 7–20)
CALCIUM SERPL-MCNC: 8.9 MG/DL (ref 8.3–10.6)
CHLORIDE SERPL-SCNC: 107 MMOL/L (ref 99–110)
CLARITY UR: CLEAR
CO2 SERPL-SCNC: 22 MMOL/L (ref 21–32)
COLOR UR: YELLOW
CREAT SERPL-MCNC: 0.7 MG/DL (ref 0.6–1.2)
CRP SERPL HS-MCNC: <3 MG/L (ref 0–5)
EPI CELLS #/AREA URNS HPF: 1 /HPF
ERYTHROCYTE [DISTWIDTH] IN BLOOD BY AUTOMATED COUNT: 12.5 % (ref 11.7–14.9)
GFR, ESTIMATED: 85 ML/MIN/1.73M2
GLUCOSE SERPL-MCNC: 109 MG/DL (ref 74–99)
GLUCOSE UR STRIP-MCNC: NEGATIVE MG/DL
HCT VFR BLD AUTO: 42.5 % (ref 37–47)
HGB BLD-MCNC: 14 G/DL (ref 12.5–16)
HGB UR QL STRIP.AUTO: NEGATIVE
KETONES UR STRIP-MCNC: NEGATIVE MG/DL
LACTATE BLDV-SCNC: 2.2 MMOL/L (ref 0.4–2)
LEUKOCYTE ESTERASE UR QL STRIP: ABNORMAL
MAGNESIUM SERPL-MCNC: 2.3 MG/DL (ref 1.8–2.4)
MCH RBC QN AUTO: 31.4 PG (ref 27–31)
MCHC RBC AUTO-ENTMCNC: 32.9 G/DL (ref 32–36)
MCV RBC AUTO: 95.3 FL (ref 78–100)
MUCOUS THREADS URNS QL MICRO: ABNORMAL
NITRITE UR QL STRIP: NEGATIVE
PH UR STRIP: 6 [PH] (ref 5–8)
PLATELET # BLD AUTO: 327 K/UL (ref 140–440)
PMV BLD AUTO: 10.1 FL (ref 7.5–11.1)
POTASSIUM SERPL-SCNC: 3.7 MMOL/L (ref 3.5–5.1)
PROT UR STRIP-MCNC: NEGATIVE MG/DL
RBC # BLD AUTO: 4.46 M/UL (ref 4.2–5.4)
RBC #/AREA URNS HPF: 3 /HPF (ref 0–2)
SODIUM SERPL-SCNC: 140 MMOL/L (ref 136–145)
SP GR UR STRIP: 1.01 (ref 1–1.03)
TRICHOMONAS #/AREA URNS HPF: ABNORMAL /[HPF]
TROPONIN I SERPL HS-MCNC: 10 NG/L (ref 0–14)
UROBILINOGEN UR STRIP-ACNC: 0.2 EU/DL (ref 0–1)
WBC #/AREA URNS HPF: 2 /HPF (ref 0–5)
WBC OTHER # BLD: 14.8 K/UL (ref 4–10.5)

## 2024-10-25 PROCEDURE — 94761 N-INVAS EAR/PLS OXIMETRY MLT: CPT

## 2024-10-25 PROCEDURE — 36415 COLL VENOUS BLD VENIPUNCTURE: CPT

## 2024-10-25 PROCEDURE — 83735 ASSAY OF MAGNESIUM: CPT

## 2024-10-25 PROCEDURE — 96372 THER/PROPH/DIAG INJ SC/IM: CPT

## 2024-10-25 PROCEDURE — 6370000000 HC RX 637 (ALT 250 FOR IP): Performed by: STUDENT IN AN ORGANIZED HEALTH CARE EDUCATION/TRAINING PROGRAM

## 2024-10-25 PROCEDURE — 81001 URINALYSIS AUTO W/SCOPE: CPT

## 2024-10-25 PROCEDURE — 80048 BASIC METABOLIC PNL TOTAL CA: CPT

## 2024-10-25 PROCEDURE — 93005 ELECTROCARDIOGRAM TRACING: CPT

## 2024-10-25 PROCEDURE — 85027 COMPLETE CBC AUTOMATED: CPT

## 2024-10-25 PROCEDURE — 83605 ASSAY OF LACTIC ACID: CPT

## 2024-10-25 PROCEDURE — 6360000002 HC RX W HCPCS: Performed by: STUDENT IN AN ORGANIZED HEALTH CARE EDUCATION/TRAINING PROGRAM

## 2024-10-25 PROCEDURE — 84484 ASSAY OF TROPONIN QUANT: CPT

## 2024-10-25 PROCEDURE — G0378 HOSPITAL OBSERVATION PER HR: HCPCS

## 2024-10-25 RX ORDER — MECLIZINE HYDROCHLORIDE 25 MG/1
25 TABLET ORAL 3 TIMES DAILY PRN
Status: DISCONTINUED | OUTPATIENT
Start: 2024-10-25 | End: 2024-10-25 | Stop reason: SDUPTHER

## 2024-10-25 RX ORDER — MECLIZINE HYDROCHLORIDE 25 MG/1
25 TABLET ORAL 3 TIMES DAILY PRN
Status: DISCONTINUED | OUTPATIENT
Start: 2024-10-25 | End: 2024-10-26

## 2024-10-25 RX ORDER — LORAZEPAM 2 MG/ML
0.5 INJECTION INTRAMUSCULAR ONCE
Status: COMPLETED | OUTPATIENT
Start: 2024-10-25 | End: 2024-10-26

## 2024-10-25 RX ADMIN — MECLIZINE HYDROCHLORIDE 25 MG: 25 TABLET ORAL at 04:32

## 2024-10-25 RX ADMIN — ASPIRIN 81 MG: 81 TABLET, CHEWABLE ORAL at 10:45

## 2024-10-25 RX ADMIN — ATORVASTATIN CALCIUM 80 MG: 40 TABLET, FILM COATED ORAL at 20:43

## 2024-10-25 RX ADMIN — LEVOTHYROXINE SODIUM 88 MCG: 0.09 TABLET ORAL at 06:20

## 2024-10-25 RX ADMIN — SERTRALINE HYDROCHLORIDE 50 MG: 50 TABLET ORAL at 10:45

## 2024-10-25 RX ADMIN — ENOXAPARIN SODIUM 40 MG: 100 INJECTION SUBCUTANEOUS at 10:45

## 2024-10-25 ASSESSMENT — PAIN SCALES - GENERAL
PAINLEVEL_OUTOF10: 0

## 2024-10-25 NOTE — ED NOTES
Pt too dizzy to attempt orthostatic blood pressure's at this time. Pt requesting to do these at a later time.  
Abnormal; Notable for the following components:       Result Value    WBC 18.9 (*)     MCH 32.0 (*)     Neutrophils % 86 (*)     Lymphocytes % 8 (*)     Monocytes % 6 (*)     Immature Granulocytes % 1 (*)     All other components within normal limits   COMPREHENSIVE METABOLIC PANEL - Abnormal; Notable for the following components:    CO2 20 (*)     Glucose 112 (*)     All other components within normal limits   LACTATE, SEPSIS - Abnormal; Notable for the following components:    Lactic Acid, Sepsis 3.0 (*)     All other components within normal limits   POCT GLUCOSE - Abnormal; Notable for the following components:    POC Glucose 108 (*)     All other components within normal limits        Background  History:   Past Medical History:   Diagnosis Date    Hyperlipidemia     Hypertension     Thyroid disease        Assessment    Vitals:    Level of Consciousness: Alert (0)   Vitals:    10/24/24 1600 10/24/24 1630 10/24/24 1815 10/24/24 1830   BP: (!) 144/79 139/83 (!) 144/69 (!) 151/93   Pulse: 82 84 76 74   Resp: 16 14 17 18   Temp:       TempSrc:       SpO2:   100% 99%   Weight:       Height:           PO Status: Nothing by Mouth    O2 Flow Rate:        Cardiac Rhythm: NSR    Last documented pain medication administered: Ruby    NIH Score: NIH NIH Stroke Scale  NIH Stroke Scale Assessed: Yes  Interval: Baseline  Level of Consciousness (1a): Alert  LOC Questions (1b): Answers both correctly  LOC Commands (1c): Performs both tasks correctly  Best Gaze (2): Normal  Visual (3): No visual loss  Facial Palsy (4): Normal symmetrical movement  Motor Arm, Left (5a): No drift  Motor Arm, Right (5b): No drift  Motor Leg, Left (6a): No drift  Motor Leg, Right (6b): No drift  Limb Ataxia (7): Absent  Sensory (8): Normal  Best Language (9): No aphasia  Dysarthria (10): Normal  Extinction and Inattention (11): No abnormality  Total: 0     Active LDA's:   Peripheral IV 10/24/24 Right Antecubital (Active)       Pertinent or High

## 2024-10-25 NOTE — PROGRESS NOTES
V2.0  [facility] [place]      Name:  Elvia Kay /Age/Sex: 1950  (74 y.o. female)   MRN & CSN:  [id number] & [id number] Encounter Date/Time: 10/25/2024 4:07 PM EDT    Location:  [address] [place] [address] PCP: Bridget Jacobo MD       Hospital Day: 2    Assessment and Plan:   Elvia Kay is a 74 y.o. female with pmh as noted below who presents with Dizziness      Plan:  Dizziness, rule out posterior circulation CVA  LKN 2100 (10/25/2024) NIH 0 CT brain and CTA head and neck reviewed, no acute intracranial abnormality no significant LVO or aneurysm. EKG personally reviewed, normal sinus rhythm 77/min no acute ST-T wave abnormalities.  Aspirin and Lipitor, NIHSS and neurochecks per stroke protocol  Meclizine prn and Valium one dose for possible peripheral etiology  Hold home Amlodipine   MRI brain pending  --10/25 Consult Neurology. Patient room spinning sensation last night, now reports feels like her spinning within the room     Irregular Heart rhythm  -- patient denies history of A fib,   --report of SVT this AM  --Mag, EKG, Trop ordered    Lactic acidosis, 3.0  Probably due to nausea/vomiting, low clinical suspicion for acute abdomen, diarrhea or sepsis  Received 1.5L IV NS in ER, repeat lactic acid in am  -10/25 will repeat. Patient does not appear toxic on exam     Leukocytosis  Probably due to nausea/vomiting, low clinical suspicion for acute abdomen or sepsis  CT chest abdomen and pelvis reviewed, no evidence of active infection noted.  Low inflammatory markers, monitor off antibiotics  --10/25 Improving while off ATB     Hypertension, Hold home Amlodipine      Hypothyroidism, continue home Synthroid     Depression, continue home Zoloft     Diet ADULT DIET; Clear Liquid   DVT Prophylaxis [] Lovenox, []  Heparin, [] SCDs, [] Ambulation,  [] Eliquis, [] Xarelto  [] Coumadin   Code Status Full Code   Disposition From: Home  Expected Disposition: Home  Estimated Date of Discharge: 1-2

## 2024-10-25 NOTE — PROGRESS NOTES
4 Eyes Skin Assessment     NAME:  Elvia Kay  YOB: 1950  MEDICAL RECORD NUMBER:  [id number]    The patient is being assessed for  Admission    I agree that at least one RN has performed a thorough Head to Toe Skin Assessment on the patient. ALL assessment sites listed below have been assessed.      Areas assessed by both nurses:    Head, Face, Ears, Shoulders, Back, Chest, Arms, Elbows, Hands, Sacrum. Buttock, Coccyx, Ischium, Legs. Feet and Heels, and Under Medical Devices         Does the Patient have a Wound? No noted wound(s)       Miguel Prevention initiated by RN: No  Wound Care Orders initiated by RN: No    Pressure Injury (Stage 3,4, Unstageable, DTI, NWPT, and Complex wounds) if present, place Wound referral order by RN under : No    New Ostomies, if present place, Ostomy referral order under : No     Nurse 1 eSignature: Electronically signed by Dot Mclaughlin LPN on 10/25/24 at 12:12 AM EDT    **SHARE this note so that the co-signing nurse can place an eSignature**    Nurse 2 eSignature: Electronically signed by Eileen Cantu RN on 10/25/24 at 2:26 AM EDT

## 2024-10-25 NOTE — CARE COORDINATION
10/25/24 1513   Service Assessment   Patient Orientation Alert and Oriented   Cognition Alert   History Provided By Patient   Primary Caregiver Self   Support Systems Family Members;Friends/Neighbors   Patient's Healthcare Decision Maker is: Legal Next of Kin   PCP Verified by CM Yes   Last Visit to PCP Within last year   Prior Functional Level Independent in ADLs/IADLs   Current Functional Level Independent in ADLs/IADLs   Can patient return to prior living arrangement Yes   Ability to make needs known: Good   Family able to assist with home care needs: Yes   Would you like for me to discuss the discharge plan with any other family members/significant others, and if so, who? No   Financial Resources Medicare   Community Resources None     This RN CM to the bedside to initiate DC planning. From home, independent. PCP and insurance. Patient denies any HHC. Patient has a cane at home. Patient denies any needs at this time. CM following for any new needs that arise.

## 2024-10-25 NOTE — H&P
History and Physical      Name:  Elvia Kay /Age/Sex: 1950  (74 y.o. female)   MRN & CSN:  [id number] & [id number] Encounter Date/Time:10/24/2024  8:29 PM EDT   Location:  [address] [place] [address] PCP: Bridget Jacobo MD       Assessment and Plan:   Elvia Kay is a 74 y.o. female with hypertension, hyperlipidemia, hypothyroidism, depression presented from home due to dizziness    Dizziness, rule out posterior circulation CVA  LKN 2100 (10/25/2024) NIH 0 CT brain and CTA head and neck reviewed, no acute intracranial abnormality no significant LVO or aneurysm. EKG personally reviewed, normal sinus rhythm 77/min no acute ST-T wave abnormalities.  Aspirin and Lipitor, NIHSS and neurochecks per stroke protocol  Meclizine prn and Valium one dose for possible peripheral etiology  Hold home Amlodipine   MRI brain in am     Lactic acidosis, 3.0  Probably due to nausea/vomiting, low clinical suspicion for acute abdomen, diarrhea or sepsis  Received 1.5L IV NS in ER, repeat lactic acid in am    Leukocytosis  Probably due to nausea/vomiting, low clinical suspicion for acute abdomen or sepsis  CT chest abdomen and pelvis reviewed, no evidence of active infection noted.  Low inflammatory markers, monitor off antibiotics    Hypertension, Hold home Amlodipine     Hypothyroidism, continue home Synthroid    Depression, continue home Zoloft     Observation MedSurg telemetry  Full code    Disposition:     Current Living situation: Home  Expected Disposition: Home  Estimated D/C: 1-2 days    Diet ADULT DIET; Clear Liquid   DVT Prophylaxis [x] Lovenox, []  Heparin, [] SCDs, [] Ambulation,  [] Eliquis, [] Xarelto, [] Coumadin   Code Status Full Code   Surrogate Decision Maker/ LACEY Nix     Personally reviewed Lab Studies and Imaging   EKG interpreted personally and results NSR 77/min, non specific T wave changes anterolateral leads. Qtc 468 ms. Discussed management of the case with ER provider who recommended admission

## 2024-10-26 ENCOUNTER — APPOINTMENT (OUTPATIENT)
Dept: MRI IMAGING | Age: 74
End: 2024-10-26
Payer: COMMERCIAL

## 2024-10-26 LAB
ANION GAP SERPL CALCULATED.3IONS-SCNC: 12 MMOL/L (ref 9–17)
BUN SERPL-MCNC: 14 MG/DL (ref 7–20)
CALCIUM SERPL-MCNC: 8.9 MG/DL (ref 8.3–10.6)
CHLORIDE SERPL-SCNC: 105 MMOL/L (ref 99–110)
CO2 SERPL-SCNC: 22 MMOL/L (ref 21–32)
CREAT SERPL-MCNC: 0.7 MG/DL (ref 0.6–1.2)
EKG ATRIAL RATE: 77 BPM
EKG ATRIAL RATE: 88 BPM
EKG DIAGNOSIS: NORMAL
EKG DIAGNOSIS: NORMAL
EKG P AXIS: 41 DEGREES
EKG P AXIS: 46 DEGREES
EKG P-R INTERVAL: 110 MS
EKG P-R INTERVAL: 112 MS
EKG Q-T INTERVAL: 388 MS
EKG Q-T INTERVAL: 414 MS
EKG QRS DURATION: 108 MS
EKG QRS DURATION: 64 MS
EKG QTC CALCULATION (BAZETT): 468 MS
EKG QTC CALCULATION (BAZETT): 469 MS
EKG R AXIS: 32 DEGREES
EKG R AXIS: 8 DEGREES
EKG T AXIS: 8 DEGREES
EKG T AXIS: 9 DEGREES
EKG VENTRICULAR RATE: 77 BPM
EKG VENTRICULAR RATE: 88 BPM
GFR, ESTIMATED: 86 ML/MIN/1.73M2
GLUCOSE BLD-MCNC: 127 MG/DL (ref 74–99)
GLUCOSE SERPL-MCNC: 111 MG/DL (ref 74–99)
LACTATE BLDV-SCNC: 1.6 MMOL/L (ref 0.4–2)
POTASSIUM SERPL-SCNC: 3.1 MMOL/L (ref 3.5–5.1)
SODIUM SERPL-SCNC: 139 MMOL/L (ref 136–145)
TROPONIN I SERPL HS-MCNC: 6 NG/L (ref 0–14)
TSH SERPL DL<=0.05 MIU/L-ACNC: 1.09 UIU/ML (ref 0.27–4.2)

## 2024-10-26 PROCEDURE — 6360000002 HC RX W HCPCS: Performed by: STUDENT IN AN ORGANIZED HEALTH CARE EDUCATION/TRAINING PROGRAM

## 2024-10-26 PROCEDURE — 6360000002 HC RX W HCPCS: Performed by: INTERNAL MEDICINE

## 2024-10-26 PROCEDURE — G0378 HOSPITAL OBSERVATION PER HR: HCPCS

## 2024-10-26 PROCEDURE — 36415 COLL VENOUS BLD VENIPUNCTURE: CPT

## 2024-10-26 PROCEDURE — 80048 BASIC METABOLIC PNL TOTAL CA: CPT

## 2024-10-26 PROCEDURE — 96372 THER/PROPH/DIAG INJ SC/IM: CPT

## 2024-10-26 PROCEDURE — 84443 ASSAY THYROID STIM HORMONE: CPT

## 2024-10-26 PROCEDURE — 94761 N-INVAS EAR/PLS OXIMETRY MLT: CPT

## 2024-10-26 PROCEDURE — 93010 ELECTROCARDIOGRAM REPORT: CPT | Performed by: INTERNAL MEDICINE

## 2024-10-26 PROCEDURE — 84484 ASSAY OF TROPONIN QUANT: CPT

## 2024-10-26 PROCEDURE — 82962 GLUCOSE BLOOD TEST: CPT

## 2024-10-26 PROCEDURE — 70551 MRI BRAIN STEM W/O DYE: CPT

## 2024-10-26 PROCEDURE — 83605 ASSAY OF LACTIC ACID: CPT

## 2024-10-26 PROCEDURE — 6370000000 HC RX 637 (ALT 250 FOR IP): Performed by: STUDENT IN AN ORGANIZED HEALTH CARE EDUCATION/TRAINING PROGRAM

## 2024-10-26 PROCEDURE — 96375 TX/PRO/DX INJ NEW DRUG ADDON: CPT

## 2024-10-26 PROCEDURE — 6370000000 HC RX 637 (ALT 250 FOR IP): Performed by: NURSE PRACTITIONER

## 2024-10-26 PROCEDURE — 2580000003 HC RX 258: Performed by: STUDENT IN AN ORGANIZED HEALTH CARE EDUCATION/TRAINING PROGRAM

## 2024-10-26 RX ORDER — MECLIZINE HYDROCHLORIDE 25 MG/1
25 TABLET ORAL 3 TIMES DAILY
Status: DISCONTINUED | OUTPATIENT
Start: 2024-10-26 | End: 2024-10-28 | Stop reason: HOSPADM

## 2024-10-26 RX ORDER — MECLIZINE HYDROCHLORIDE 25 MG/1
25 TABLET ORAL 3 TIMES DAILY
Qty: 30 TABLET | Refills: 0 | Status: SHIPPED | OUTPATIENT
Start: 2024-10-26 | End: 2024-11-05

## 2024-10-26 RX ORDER — SODIUM CHLORIDE 9 MG/ML
INJECTION, SOLUTION INTRAVENOUS CONTINUOUS
Status: DISPENSED | OUTPATIENT
Start: 2024-10-26 | End: 2024-10-27

## 2024-10-26 RX ADMIN — WATER 1000 MG: 1 INJECTION INTRAMUSCULAR; INTRAVENOUS; SUBCUTANEOUS at 22:00

## 2024-10-26 RX ADMIN — SERTRALINE HYDROCHLORIDE 50 MG: 50 TABLET ORAL at 08:09

## 2024-10-26 RX ADMIN — MECLIZINE HYDROCHLORIDE 25 MG: 25 TABLET ORAL at 15:16

## 2024-10-26 RX ADMIN — SODIUM CHLORIDE: 9 INJECTION, SOLUTION INTRAVENOUS at 22:07

## 2024-10-26 RX ADMIN — ASPIRIN 81 MG: 81 TABLET, CHEWABLE ORAL at 08:09

## 2024-10-26 RX ADMIN — MECLIZINE HYDROCHLORIDE 25 MG: 25 TABLET ORAL at 08:28

## 2024-10-26 RX ADMIN — ATORVASTATIN CALCIUM 80 MG: 40 TABLET, FILM COATED ORAL at 22:00

## 2024-10-26 RX ADMIN — LEVOTHYROXINE SODIUM 88 MCG: 0.09 TABLET ORAL at 06:58

## 2024-10-26 RX ADMIN — ENOXAPARIN SODIUM 40 MG: 100 INJECTION SUBCUTANEOUS at 08:11

## 2024-10-26 RX ADMIN — MECLIZINE HYDROCHLORIDE 25 MG: 25 TABLET ORAL at 22:00

## 2024-10-26 RX ADMIN — LORAZEPAM 0.5 MG: 2 INJECTION INTRAMUSCULAR; INTRAVENOUS at 08:35

## 2024-10-26 ASSESSMENT — PAIN SCALES - GENERAL
PAINLEVEL_OUTOF10: 0
PAINLEVEL_OUTOF10: 0

## 2024-10-26 NOTE — PLAN OF CARE
Problem: Safety - Adult  Goal: Free from fall injury  Outcome: Progressing     Problem: Discharge Planning  Goal: Discharge to home or other facility with appropriate resources  Outcome: Progressing  Flowsheets (Taken 10/25/2024 1104 by Nicci Patterson LPN)  Discharge to home or other facility with appropriate resources: Identify barriers to discharge with patient and caregiver

## 2024-10-26 NOTE — CONSULTS
Neurology Service Consult Note  [facility]   Patient Name: Elvia Kay  : 1950        Subjective:   Reason for consult: Dizziness  74 y.o. female with history of HLD, HTN, hypothyroid, chronic dizziness presenting to [facility] with onset of room spinning dizziness about one hour after waking up. Symptoms were associated with nausea and vomiting.       Chart was reviewed in detail. She had hospitalization 2022 at [facility] for expressive aphasia described as word finding difficulties. CT head and CTA head and neck completed and did not reveal any acute or concerning findings. Patient refused MRI brain and was discharged on DAPT x 21 days followed by monotherapy and statin due to concern for TIA v lacunar infarct. She followed up with our office 2023 and was seen by AYDEN Benton NP. Patient also described balance issues, memory concerns. EMG of the lower extremities completed and was normal. Recommendations were made to continue aspirin and statin for secondary stroke prevention, PT was ordered for balance therapy. Lastly patient was referred back to her PCP to discuss anxiety, depression and stress which could contribute to memory changes. Patient was also seen at [facility] 2024 following syncopal episode while at [employer]. Per chart patient described flushing, lightheadedness ,narrow vision. Imaging (CT) and echo were completed and negative for acute findings.     Patient was seen and assessed. She is resting in bed, has just completed MRI brain. She tells me that she has chronic vertigo however feels her symptoms are occurring more frequently and are mote intense. She describes sensation that she is moving with her dizziness. Symptoms are worse with movement or moving her head and improve when she is lying still. She does have nausea associated with her dizziness.  She describes continued anxiety and depression with loss of several

## 2024-10-26 NOTE — SIGNIFICANT EVENT
focal deficit present.      Mental Status: She is alert and oriented to person, place, and time. Mental status is at baseline.   Psychiatric:         Mood and Affect: Mood normal.         Behavior: Behavior normal.         Thought Content: Thought content normal.         Judgment: Judgment normal.           ASSESSMENT/PLAN:      Symptomatic dizziness  Patient with a neurowork-up and continues to be symptomatic  Cancel discharge  No need for repeat imaging as patient did not hit her head to have full imaging workup.  IV hydration overnight  Will send stat labs (BMP, CBC and Trop=) and repeat UA/culture    Urinary tract infection  UA on arrival did have some signs of infection.  Will resend UA and culture to see if infection is worsening and etiology of her presentation  Initiate IV Rocephin  Repeat UA/culture sent        Patient is critical with high probability of clinical deterioration. 25 minutes of critical care time spent. This time includes time spent at bedside interviewing and examining patient, coordinating care, review of records, discussing with patient and  family at bedside.    Due to the immediate potential for life-threatening deterioration due to dizziness , I spent 25 minutes providing critical care.  This time is excluding time spent performing procedures.    Electronically signed by Mikal Camarena MD on 10/26/2024 at 6:59 PM

## 2024-10-27 LAB
BASOPHILS # BLD: 0.05 K/UL
BASOPHILS NFR BLD: 0 % (ref 0–1)
EOSINOPHIL # BLD: 0.14 K/UL
EOSINOPHILS RELATIVE PERCENT: 1 % (ref 0–3)
ERYTHROCYTE [DISTWIDTH] IN BLOOD BY AUTOMATED COUNT: 12.2 % (ref 11.7–14.9)
HCT VFR BLD AUTO: 38.8 % (ref 37–47)
HGB BLD-MCNC: 13 G/DL (ref 12.5–16)
IMM GRANULOCYTES # BLD AUTO: 0.06 K/UL
IMM GRANULOCYTES NFR BLD: 0 %
LYMPHOCYTES NFR BLD: 1.35 K/UL
LYMPHOCYTES RELATIVE PERCENT: 9 % (ref 24–44)
MCH RBC QN AUTO: 31.6 PG (ref 27–31)
MCHC RBC AUTO-ENTMCNC: 33.5 G/DL (ref 32–36)
MCV RBC AUTO: 94.2 FL (ref 78–100)
MONOCYTES NFR BLD: 1.29 K/UL
MONOCYTES NFR BLD: 9 % (ref 0–4)
NEUTROPHILS NFR BLD: 81 % (ref 36–66)
NEUTS SEG NFR BLD: 12.29 K/UL
PLATELET # BLD AUTO: 292 K/UL (ref 140–440)
PMV BLD AUTO: 10.1 FL (ref 7.5–11.1)
RBC # BLD AUTO: 4.12 M/UL (ref 4.2–5.4)
WBC OTHER # BLD: 15.2 K/UL (ref 4–10.5)

## 2024-10-27 PROCEDURE — 94761 N-INVAS EAR/PLS OXIMETRY MLT: CPT

## 2024-10-27 PROCEDURE — 96376 TX/PRO/DX INJ SAME DRUG ADON: CPT

## 2024-10-27 PROCEDURE — 96361 HYDRATE IV INFUSION ADD-ON: CPT

## 2024-10-27 PROCEDURE — 6370000000 HC RX 637 (ALT 250 FOR IP): Performed by: NURSE PRACTITIONER

## 2024-10-27 PROCEDURE — 85025 COMPLETE CBC W/AUTO DIFF WBC: CPT

## 2024-10-27 PROCEDURE — 6360000002 HC RX W HCPCS: Performed by: STUDENT IN AN ORGANIZED HEALTH CARE EDUCATION/TRAINING PROGRAM

## 2024-10-27 PROCEDURE — 36415 COLL VENOUS BLD VENIPUNCTURE: CPT

## 2024-10-27 PROCEDURE — 6370000000 HC RX 637 (ALT 250 FOR IP): Performed by: STUDENT IN AN ORGANIZED HEALTH CARE EDUCATION/TRAINING PROGRAM

## 2024-10-27 PROCEDURE — G0378 HOSPITAL OBSERVATION PER HR: HCPCS

## 2024-10-27 PROCEDURE — 51798 US URINE CAPACITY MEASURE: CPT

## 2024-10-27 PROCEDURE — 96372 THER/PROPH/DIAG INJ SC/IM: CPT

## 2024-10-27 PROCEDURE — 2580000003 HC RX 258: Performed by: NURSE PRACTITIONER

## 2024-10-27 PROCEDURE — 2580000003 HC RX 258: Performed by: STUDENT IN AN ORGANIZED HEALTH CARE EDUCATION/TRAINING PROGRAM

## 2024-10-27 RX ORDER — TAMSULOSIN HYDROCHLORIDE 0.4 MG/1
0.4 CAPSULE ORAL ONCE
Status: COMPLETED | OUTPATIENT
Start: 2024-10-27 | End: 2024-10-27

## 2024-10-27 RX ORDER — POTASSIUM CHLORIDE 1500 MG/1
40 TABLET, EXTENDED RELEASE ORAL ONCE
Status: COMPLETED | OUTPATIENT
Start: 2024-10-27 | End: 2024-10-27

## 2024-10-27 RX ORDER — SODIUM CHLORIDE 9 MG/ML
INJECTION, SOLUTION INTRAVENOUS CONTINUOUS
Status: DISCONTINUED | OUTPATIENT
Start: 2024-10-27 | End: 2024-10-28 | Stop reason: HOSPADM

## 2024-10-27 RX ADMIN — ATORVASTATIN CALCIUM 80 MG: 40 TABLET, FILM COATED ORAL at 22:03

## 2024-10-27 RX ADMIN — WATER 1000 MG: 1 INJECTION INTRAMUSCULAR; INTRAVENOUS; SUBCUTANEOUS at 22:03

## 2024-10-27 RX ADMIN — LEVOTHYROXINE SODIUM 88 MCG: 0.09 TABLET ORAL at 05:25

## 2024-10-27 RX ADMIN — SERTRALINE HYDROCHLORIDE 50 MG: 50 TABLET ORAL at 10:20

## 2024-10-27 RX ADMIN — SODIUM CHLORIDE: 9 INJECTION, SOLUTION INTRAVENOUS at 14:36

## 2024-10-27 RX ADMIN — MECLIZINE HYDROCHLORIDE 25 MG: 25 TABLET ORAL at 13:43

## 2024-10-27 RX ADMIN — POTASSIUM CHLORIDE 40 MEQ: 1500 TABLET, EXTENDED RELEASE ORAL at 10:21

## 2024-10-27 RX ADMIN — MECLIZINE HYDROCHLORIDE 25 MG: 25 TABLET ORAL at 10:20

## 2024-10-27 RX ADMIN — ASPIRIN 81 MG: 81 TABLET, CHEWABLE ORAL at 10:21

## 2024-10-27 RX ADMIN — TAMSULOSIN HYDROCHLORIDE 0.4 MG: 0.4 CAPSULE ORAL at 13:43

## 2024-10-27 RX ADMIN — ENOXAPARIN SODIUM 40 MG: 100 INJECTION SUBCUTANEOUS at 10:21

## 2024-10-27 RX ADMIN — MECLIZINE HYDROCHLORIDE 25 MG: 25 TABLET ORAL at 22:03

## 2024-10-27 NOTE — PROGRESS NOTES
V2.0    [facility] [place]      Name:  Elvia Kay /Age/Sex: 1950  (74 y.o. female)   MRN & CSN:  [id number] & [id number] Encounter Date/Time: 10/27/2024 1:17 PM EDT   Location:  [address] [place], [place] [address] PCP: Bridget Jacobo MD     Attending:Dominguez Lew MD       Hospital Day: 4    Assessment and Recommendations   Elvia Kay is a 74 y.o. female with pmh of hypertension, hyperlipidemia, hypothyroidism, depression  who presents with Dizziness      Plan:   Dizziness chronic, rule out posterior circulation CVA  LKN 2100 (10/25/2024) NIH 0 CT brain and CTA head and neck reviewed, no acute intracranial abnormality no significant LVO or aneurysm. EKG personally reviewed, normal sinus rhythm 77/min no acute ST-T wave abnormalities.  Aspirin and Lipitor, NIHSS and neurochecks per stroke protocol  Meclizine prn and Valium one dose for possible peripheral etiology  Hold home Amlodipine   Start Meclizine 25 mg TID  Vestibular therapy  F/U with neurology  Orthostatic: Lyin/7-, Sittin/70, and standing 108/84  MRI brain consistent with chronic microangiopathic disease  --10/25 Consult Neurology. She sees Pham Mott, and has chronic vertigo and has not been compliant with vestibular therapy.       Irregular Heart rhythm  -- patient denies history of A fib,   --report of SVT this AM  --Mag, EKG, Trop ordered  -F/U with PCP     Lactic acidosis, 3.0, resolved  Probably due to nausea/vomiting, low clinical suspicion for acute abdomen, diarrhea or sepsis  Received 1.5L IV NS in ER, repeat lactic acid in am  -10/25 will repeat. Patient does not appear toxic on exam     Leukocytosis, improved  Probably due to nausea/vomiting, low clinical suspicion for acute abdomen or sepsis  CT chest abdomen and pelvis reviewed, no evidence of active infection noted.  Low inflammatory markers, monitor off antibiotics  --10/25 Improving while off ATB     Hypertension   Hold home Amlodipine      Hypothyroidism  continue home

## 2024-10-27 NOTE — CONSULTS
Consult cancelled. Staff RN obtained PIV access to patient's meet therapeutic needs.     Consult the Vascular Access Team for questions, concerns, or change in patient's condition.

## 2024-10-27 NOTE — PROGRESS NOTES
POST FALL MANAGEMENT    Elvia Kay  MEDICAL RECORD NUMBER:  [id number]  AGE: 74 y.o.   GENDER: female  : 1950  TODAYS DATE:  10/26/2024    Details     Fall Occurred: Yes    Was the Fall Witnessed:  Yes (assisted)      Brief Review of Event:Patient was waiting on transport to leave the unit  for discharge. While waiting on significant order to transport pt. Home, pt. Walked out off the room to the zarate way without using the call light.Staff observed that patient was slowly sliding down the floor and quickly assisted patient to the floor safely. Patient denies hitting her head and any form of injuries.        Who found the patient: Bertha Pfeiffer RN      Where was the patient at the time of the fall: Hallway      Patient Comments: Pt. States, \" I got dizzy, I was helped to the floor, I have no injuries.\"       Date Fall Occurred:  2024 .       Time Fall Occurred: 1850p.m.     Assessment     Post Fall Head to Toe Assessment Completed: Yes    Post Fall Predictive Analytic Score Reviewed: Yes     Post Fall Vitals Completed: Yes    Post Fall Neuro Checks Completed: Yes    Injury Occurred(if yes, describe injury):  no           Did the Patient Experience:(Check Jeovany all that apply)    [] Patient hit head  [] Loss of consciousness  [] Change in mental status following the fall  [] Patient is on an anticoagulant medication      CT Performed:  no    Follow-up     Persons Notified of Fall:  (Provide names of persons notified)   [x] Physician: KYARA Camarena  [] CAROLINE:  [x] Nursing Supervisior: Kemar  [] Manager:  [] Pharmacist:  [x] Family: Boyfriend at bed side  [] Other:      Electronically signed by Tyler Subramanian RN 10/26/2024 at 8:07 PM

## 2024-10-28 VITALS
HEIGHT: 64 IN | OXYGEN SATURATION: 98 % | DIASTOLIC BLOOD PRESSURE: 61 MMHG | HEART RATE: 93 BPM | BODY MASS INDEX: 24.46 KG/M2 | SYSTOLIC BLOOD PRESSURE: 126 MMHG | RESPIRATION RATE: 18 BRPM | WEIGHT: 143.3 LBS | TEMPERATURE: 97.6 F

## 2024-10-28 LAB
BASOPHILS # BLD: 0.04 K/UL
BASOPHILS NFR BLD: 0 % (ref 0–1)
EOSINOPHIL # BLD: 0.14 K/UL
EOSINOPHILS RELATIVE PERCENT: 1 % (ref 0–3)
ERYTHROCYTE [DISTWIDTH] IN BLOOD BY AUTOMATED COUNT: 12.5 % (ref 11.7–14.9)
HCT VFR BLD AUTO: 37.2 % (ref 37–47)
HGB BLD-MCNC: 12.4 G/DL (ref 12.5–16)
IMM GRANULOCYTES # BLD AUTO: 0.04 K/UL
IMM GRANULOCYTES NFR BLD: 0 %
LYMPHOCYTES NFR BLD: 1.32 K/UL
LYMPHOCYTES RELATIVE PERCENT: 12 % (ref 24–44)
MCH RBC QN AUTO: 32.1 PG (ref 27–31)
MCHC RBC AUTO-ENTMCNC: 33.3 G/DL (ref 32–36)
MCV RBC AUTO: 96.4 FL (ref 78–100)
MONOCYTES NFR BLD: 1.14 K/UL
MONOCYTES NFR BLD: 10 % (ref 0–4)
NEUTROPHILS NFR BLD: 75 % (ref 36–66)
NEUTS SEG NFR BLD: 8.25 K/UL
PLATELET # BLD AUTO: 281 K/UL (ref 140–440)
PMV BLD AUTO: 10.3 FL (ref 7.5–11.1)
POTASSIUM SERPL-SCNC: 3.5 MMOL/L (ref 3.5–5.1)
RBC # BLD AUTO: 3.86 M/UL (ref 4.2–5.4)
WBC OTHER # BLD: 10.9 K/UL (ref 4–10.5)

## 2024-10-28 PROCEDURE — 85025 COMPLETE CBC W/AUTO DIFF WBC: CPT

## 2024-10-28 PROCEDURE — 6360000002 HC RX W HCPCS: Performed by: STUDENT IN AN ORGANIZED HEALTH CARE EDUCATION/TRAINING PROGRAM

## 2024-10-28 PROCEDURE — G0378 HOSPITAL OBSERVATION PER HR: HCPCS

## 2024-10-28 PROCEDURE — 96361 HYDRATE IV INFUSION ADD-ON: CPT

## 2024-10-28 PROCEDURE — 36415 COLL VENOUS BLD VENIPUNCTURE: CPT

## 2024-10-28 PROCEDURE — 84132 ASSAY OF SERUM POTASSIUM: CPT

## 2024-10-28 PROCEDURE — 2580000003 HC RX 258: Performed by: NURSE PRACTITIONER

## 2024-10-28 PROCEDURE — 6370000000 HC RX 637 (ALT 250 FOR IP): Performed by: STUDENT IN AN ORGANIZED HEALTH CARE EDUCATION/TRAINING PROGRAM

## 2024-10-28 PROCEDURE — 6370000000 HC RX 637 (ALT 250 FOR IP): Performed by: NURSE PRACTITIONER

## 2024-10-28 PROCEDURE — 94761 N-INVAS EAR/PLS OXIMETRY MLT: CPT

## 2024-10-28 PROCEDURE — 96372 THER/PROPH/DIAG INJ SC/IM: CPT

## 2024-10-28 RX ORDER — SULFAMETHOXAZOLE AND TRIMETHOPRIM 800; 160 MG/1; MG/1
1 TABLET ORAL 2 TIMES DAILY
Qty: 6 TABLET | Refills: 0 | Status: SHIPPED | OUTPATIENT
Start: 2024-10-28 | End: 2024-10-31

## 2024-10-28 RX ADMIN — MECLIZINE HYDROCHLORIDE 25 MG: 25 TABLET ORAL at 10:06

## 2024-10-28 RX ADMIN — SODIUM CHLORIDE: 9 INJECTION, SOLUTION INTRAVENOUS at 02:32

## 2024-10-28 RX ADMIN — ASPIRIN 81 MG: 81 TABLET, CHEWABLE ORAL at 10:06

## 2024-10-28 RX ADMIN — SERTRALINE HYDROCHLORIDE 50 MG: 50 TABLET ORAL at 10:06

## 2024-10-28 RX ADMIN — ENOXAPARIN SODIUM 40 MG: 100 INJECTION SUBCUTANEOUS at 10:06

## 2024-10-28 RX ADMIN — LEVOTHYROXINE SODIUM 88 MCG: 0.09 TABLET ORAL at 10:06

## 2024-10-28 NOTE — DISCHARGE SUMMARY
V2.0  Discharge Summary    Name:  Elvia Kay /Age/Sex: 1950 (74 y.o. female)   Admit Date: 10/24/2024  Discharge Date: 10/26/24    MRN & CSN:  [id number] & [id number] Encounter Date and Time 10/26/24 1:55 PM EDT    Attending:  Dominguez Lew MD Discharging Provider: GIDEON Almanza [facility] Course:     Brief HPI: Elvia Kay is a 74 y.o. female who presented with hypertension, hyperlipidemia, hypothyroidism, depression presented from home due to dizziness. Patient reports that she started having symptoms last night but started improving after she slept. Then she woke up and around 0900 started having symptoms again. Describes it as dizziness, room spinning sensation in both directions. Associated with nausea and non bloody non bilious emesis. Denies fever, chest pain, LOC headache. Patient did receive Meclizine, Reglan, Benadryl in ER without any significant alleviation in symptoms.  During my evaluation patient was alert oriented x 3 hemodynamically stable still complaining of persistent dizziness.       Brief Problem Based Course:     Dizziness chronic, rule out posterior circulation CVA  LKN 2100 (10/25/2024) NIH 0 CT brain and CTA head and neck reviewed, no acute intracranial abnormality no significant LVO or aneurysm. EKG personally reviewed, normal sinus rhythm 77/min no acute ST-T wave abnormalities.  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 She sees Pham Mott, and has chronic vertigo and has not been compliant with vestibular therapy.      Irregular Heart rhythm  -- patient denies history of A fib,   --report of SVT this AM  --Mag, EKG, Trop ordered  -F/U with PCP     Lactic acidosis, 3.0, 
    10/26/24  2013 10/28/24  0207     --    K 3.1* 3.5     --    CO2 22  --    BUN 14  --    CREATININE 0.7  --    GLUCOSE 111*  --      Hepatic: No results for input(s): \"AST\", \"ALT\", \"BILITOT\", \"ALKPHOS\" in the last 72 hours.    Invalid input(s): \"ALB\"  Lipids:   Lab Results   Component Value Date/Time    CHOL 229 07/29/2011 08:58 AM    HDL 40 07/29/2011 08:58 AM    TRIG 176 07/29/2011 08:58 AM     Hemoglobin A1C: No results found for: \"LABA1C\"  TSH:   Lab Results   Component Value Date/Time    TSH 1.09 10/26/2024 02:51 AM     Troponin:   Lab Results   Component Value Date/Time    TROPONINT <0.010 07/12/2014 07:35 AM     Lactic Acid:   Recent Labs     10/25/24  1737 10/26/24  2012   LACTA 2.2* 1.6     BNP: No results for input(s): \"PROBNP\" in the last 72 hours.  UA:  Lab Results   Component Value Date/Time    NITRU NEGATIVE 10/25/2024 04:35 AM    NITRU NEGATIVE 08/11/2013 02:25 PM    COLORU Yellow 10/25/2024 04:35 AM    PHUR 6.0 10/25/2024 04:35 AM    WBCUA 2 10/25/2024 04:35 AM    RBCUA 3 10/25/2024 04:35 AM    RBCUA 1 07/12/2014 07:35 AM    MUCUS RARE 10/25/2024 04:35 AM    TRICHOMONAS None seen 10/25/2024 04:35 AM    BACTERIA OCCASIONAL 07/12/2014 07:35 AM    CLARITYU HAZY 07/12/2014 07:35 AM    LEUKOCYTESUR TRACE 10/25/2024 04:35 AM    UROBILINOGEN 0.2 10/25/2024 04:35 AM    BILIRUBINUR NEGATIVE 10/25/2024 04:35 AM    BLOODU NEGATIVE 07/12/2014 07:35 AM    GLUCOSEU NEGATIVE 10/25/2024 04:35 AM    KETUA NEGATIVE 10/25/2024 04:35 AM     Urine Cultures: No results found for: \"LABURIN\"  Blood Cultures: No results found for: \"BC\"  No results found for: \"BLOODCULT2\"  Organism:   Lab Results   Component Value Date/Time    ORG ECOL 08/11/2013 02:25 PM       Time Spent Discharging patient 35 minutes    Electronically signed by GIDEON Almanza CNP on 10/28/2024 at 9:04 AM

## 2024-10-28 NOTE — PLAN OF CARE
Problem: Safety - Adult  Goal: Free from fall injury  10/28/2024 0613 by Mechelle Osorio RN  Outcome: Progressing  10/27/2024 1704 by Iwona Duran LPN  Outcome: Progressing     Problem: Discharge Planning  Goal: Discharge to home or other facility with appropriate resources  10/28/2024 0613 by Mechelle Osorio RN  Outcome: Progressing  10/27/2024 1704 by Iwona Duran LPN  Outcome: Progressing

## 2024-10-28 NOTE — PLAN OF CARE
Problem: Safety - Adult  Goal: Free from fall injury  10/28/2024 0937 by Samreen Ortiz, RN  Outcome: Adequate for Discharge  10/28/2024 0613 by Mechelle Osorio RN  Outcome: Progressing     Problem: Discharge Planning  Goal: Discharge to home or other facility with appropriate resources  10/28/2024 0937 by Samreen Ortiz, RN  Outcome: Adequate for Discharge  10/28/2024 0613 by Mechelle Osorio, RN  Outcome: Progressing

## 2024-10-30 LAB
MICROORGANISM SPEC CULT: NORMAL
SERVICE CMNT-IMP: NORMAL
SPECIMEN DESCRIPTION: NORMAL

## 2024-11-01 ENCOUNTER — OFFICE VISIT (OUTPATIENT)
Dept: FAMILY MEDICINE CLINIC | Age: 74
End: 2024-11-01
Payer: COMMERCIAL

## 2024-11-01 VITALS
BODY MASS INDEX: 23.17 KG/M2 | HEART RATE: 73 BPM | OXYGEN SATURATION: 100 % | DIASTOLIC BLOOD PRESSURE: 60 MMHG | WEIGHT: 139.1 LBS | RESPIRATION RATE: 16 BRPM | HEIGHT: 65 IN | SYSTOLIC BLOOD PRESSURE: 106 MMHG

## 2024-11-01 DIAGNOSIS — Z86.73 HISTORY OF TIA (TRANSIENT ISCHEMIC ATTACK): ICD-10-CM

## 2024-11-01 DIAGNOSIS — R42 VERTIGO: Primary | ICD-10-CM

## 2024-11-01 DIAGNOSIS — Z76.89 ENCOUNTER TO ESTABLISH CARE: ICD-10-CM

## 2024-11-01 DIAGNOSIS — M15.0 PRIMARY OSTEOARTHRITIS INVOLVING MULTIPLE JOINTS: ICD-10-CM

## 2024-11-01 DIAGNOSIS — I10 ESSENTIAL HYPERTENSION: ICD-10-CM

## 2024-11-01 DIAGNOSIS — E03.9 HYPOTHYROIDISM, UNSPECIFIED TYPE: ICD-10-CM

## 2024-11-01 DIAGNOSIS — F32.A DEPRESSION, UNSPECIFIED DEPRESSION TYPE: ICD-10-CM

## 2024-11-01 PROCEDURE — G8484 FLU IMMUNIZE NO ADMIN: HCPCS | Performed by: STUDENT IN AN ORGANIZED HEALTH CARE EDUCATION/TRAINING PROGRAM

## 2024-11-01 PROCEDURE — G8399 PT W/DXA RESULTS DOCUMENT: HCPCS | Performed by: STUDENT IN AN ORGANIZED HEALTH CARE EDUCATION/TRAINING PROGRAM

## 2024-11-01 PROCEDURE — 3078F DIAST BP <80 MM HG: CPT | Performed by: STUDENT IN AN ORGANIZED HEALTH CARE EDUCATION/TRAINING PROGRAM

## 2024-11-01 PROCEDURE — 1123F ACP DISCUSS/DSCN MKR DOCD: CPT | Performed by: STUDENT IN AN ORGANIZED HEALTH CARE EDUCATION/TRAINING PROGRAM

## 2024-11-01 PROCEDURE — G8420 CALC BMI NORM PARAMETERS: HCPCS | Performed by: STUDENT IN AN ORGANIZED HEALTH CARE EDUCATION/TRAINING PROGRAM

## 2024-11-01 PROCEDURE — 1036F TOBACCO NON-USER: CPT | Performed by: STUDENT IN AN ORGANIZED HEALTH CARE EDUCATION/TRAINING PROGRAM

## 2024-11-01 PROCEDURE — 3074F SYST BP LT 130 MM HG: CPT | Performed by: STUDENT IN AN ORGANIZED HEALTH CARE EDUCATION/TRAINING PROGRAM

## 2024-11-01 PROCEDURE — 99214 OFFICE O/P EST MOD 30 MIN: CPT | Performed by: STUDENT IN AN ORGANIZED HEALTH CARE EDUCATION/TRAINING PROGRAM

## 2024-11-01 PROCEDURE — G8427 DOCREV CUR MEDS BY ELIG CLIN: HCPCS | Performed by: STUDENT IN AN ORGANIZED HEALTH CARE EDUCATION/TRAINING PROGRAM

## 2024-11-01 PROCEDURE — 1090F PRES/ABSN URINE INCON ASSESS: CPT | Performed by: STUDENT IN AN ORGANIZED HEALTH CARE EDUCATION/TRAINING PROGRAM

## 2024-11-01 RX ORDER — SCOLOPAMINE TRANSDERMAL SYSTEM 1 MG/1
1 PATCH, EXTENDED RELEASE TRANSDERMAL
Qty: 3 PATCH | Refills: 0 | Status: SHIPPED | OUTPATIENT
Start: 2024-11-01 | End: 2024-11-08

## 2024-11-01 RX ORDER — SCOLOPAMINE TRANSDERMAL SYSTEM 1 MG/1
1 PATCH, EXTENDED RELEASE TRANSDERMAL
Qty: 5 PATCH | Refills: 0 | Status: SHIPPED | OUTPATIENT
Start: 2024-11-01 | End: 2024-11-01

## 2024-11-01 SDOH — ECONOMIC STABILITY: INCOME INSECURITY: HOW HARD IS IT FOR YOU TO PAY FOR THE VERY BASICS LIKE FOOD, HOUSING, MEDICAL CARE, AND HEATING?: NOT HARD AT ALL

## 2024-11-01 SDOH — ECONOMIC STABILITY: FOOD INSECURITY: WITHIN THE PAST 12 MONTHS, YOU WORRIED THAT YOUR FOOD WOULD RUN OUT BEFORE YOU GOT MONEY TO BUY MORE.: NEVER TRUE

## 2024-11-01 SDOH — ECONOMIC STABILITY: FOOD INSECURITY: WITHIN THE PAST 12 MONTHS, THE FOOD YOU BOUGHT JUST DIDN'T LAST AND YOU DIDN'T HAVE MONEY TO GET MORE.: NEVER TRUE

## 2024-11-01 ASSESSMENT — PATIENT HEALTH QUESTIONNAIRE - PHQ9
7. TROUBLE CONCENTRATING ON THINGS, SUCH AS READING THE NEWSPAPER OR WATCHING TELEVISION: SEVERAL DAYS
8. MOVING OR SPEAKING SO SLOWLY THAT OTHER PEOPLE COULD HAVE NOTICED. OR THE OPPOSITE, BEING SO FIGETY OR RESTLESS THAT YOU HAVE BEEN MOVING AROUND A LOT MORE THAN USUAL: SEVERAL DAYS
2. FEELING DOWN, DEPRESSED OR HOPELESS: NOT AT ALL
SUM OF ALL RESPONSES TO PHQ QUESTIONS 1-9: 4
9. THOUGHTS THAT YOU WOULD BE BETTER OFF DEAD, OR OF HURTING YOURSELF: NOT AT ALL
SUM OF ALL RESPONSES TO PHQ9 QUESTIONS 1 & 2: 0
5. POOR APPETITE OR OVEREATING: NOT AT ALL
10. IF YOU CHECKED OFF ANY PROBLEMS, HOW DIFFICULT HAVE THESE PROBLEMS MADE IT FOR YOU TO DO YOUR WORK, TAKE CARE OF THINGS AT HOME, OR GET ALONG WITH OTHER PEOPLE: SOMEWHAT DIFFICULT
3. TROUBLE FALLING OR STAYING ASLEEP: SEVERAL DAYS
1. LITTLE INTEREST OR PLEASURE IN DOING THINGS: NOT AT ALL
4. FEELING TIRED OR HAVING LITTLE ENERGY: SEVERAL DAYS
6. FEELING BAD ABOUT YOURSELF - OR THAT YOU ARE A FAILURE OR HAVE LET YOURSELF OR YOUR FAMILY DOWN: NOT AT ALL
SUM OF ALL RESPONSES TO PHQ QUESTIONS 1-9: 4

## 2024-11-01 NOTE — PROGRESS NOTES
11/1/2024    Elviajhonathan Kay    Chief Complaint   Patient presents with    New Patient     New patient. Dr. Bridget Jacobo was previous doctor.     Dizziness     Chronic vertigo. Just got out of hospital Monday. Still having dizziness.        HPI    History of Present Illness  The patient is a 74-year-old female who presents to establish care.    She was recently discharged from [facility] on 10/28/2024 due to dizziness. Despite taking meclizine, she continues to experience dizziness. This is her first encounter with such severe dizziness, which began last Thursday and persisted throughout her 5-day hospital stay. She has a history of dizziness, but it has never been this severe. Two years ago, she consulted a neurologist due to a suspected stroke, but no cause for her dizziness was identified. She has an upcoming appointment with Neurology on 11/19/2024.    She was also prescribed Bactrim for a urinary tract infection. She reports no current urinary symptoms.    She suffers from arthritis in her knees, lower back, and hands. She receives knee injections every three months from Dr. Patrick Lees, which provide some relief. She takes Tylenol for pain management.    She has no history of stroke, but experienced slurred speech at her granddaughter's graduation, which led to an emergency room visit. She reports no weakness in her cheeks or difficulty chewing.    She takes sertraline for depression as needed, which she finds helpful. She is also on aspirin and atorvastatin.    FAMILY HISTORY  Her father had heart problems. Her mother committed suicide. Her  had colon cancer. Both her grandfathers had lung cancer.      Prior PCP Dr. Bridget Jacobo      1. Vertigo    2. Encounter to establish care    3. Primary osteoarthritis involving multiple joints    4. Depression, unspecified depression type    5. Hypothyroidism, unspecified type    6. Essential hypertension             REVIEW OF

## 2024-11-13 ENCOUNTER — OFFICE VISIT (OUTPATIENT)
Dept: NEUROLOGY | Age: 74
End: 2024-11-13
Payer: COMMERCIAL

## 2024-11-13 VITALS
BODY MASS INDEX: 23.49 KG/M2 | DIASTOLIC BLOOD PRESSURE: 70 MMHG | OXYGEN SATURATION: 99 % | WEIGHT: 139 LBS | SYSTOLIC BLOOD PRESSURE: 112 MMHG | HEART RATE: 77 BPM

## 2024-11-13 DIAGNOSIS — R42 VERTIGO: Primary | ICD-10-CM

## 2024-11-13 PROCEDURE — 1090F PRES/ABSN URINE INCON ASSESS: CPT | Performed by: NURSE PRACTITIONER

## 2024-11-13 PROCEDURE — 3074F SYST BP LT 130 MM HG: CPT | Performed by: NURSE PRACTITIONER

## 2024-11-13 PROCEDURE — G8484 FLU IMMUNIZE NO ADMIN: HCPCS | Performed by: NURSE PRACTITIONER

## 2024-11-13 PROCEDURE — 1036F TOBACCO NON-USER: CPT | Performed by: NURSE PRACTITIONER

## 2024-11-13 PROCEDURE — 3078F DIAST BP <80 MM HG: CPT | Performed by: NURSE PRACTITIONER

## 2024-11-13 PROCEDURE — G8399 PT W/DXA RESULTS DOCUMENT: HCPCS | Performed by: NURSE PRACTITIONER

## 2024-11-13 PROCEDURE — G8427 DOCREV CUR MEDS BY ELIG CLIN: HCPCS | Performed by: NURSE PRACTITIONER

## 2024-11-13 PROCEDURE — 99214 OFFICE O/P EST MOD 30 MIN: CPT | Performed by: NURSE PRACTITIONER

## 2024-11-13 PROCEDURE — G8420 CALC BMI NORM PARAMETERS: HCPCS | Performed by: NURSE PRACTITIONER

## 2024-11-13 PROCEDURE — 1123F ACP DISCUSS/DSCN MKR DOCD: CPT | Performed by: NURSE PRACTITIONER

## 2024-11-13 NOTE — PROGRESS NOTES
11/27/24    Elvia Kay  1950    Chief Complaint   Patient presents with    Follow-Up from Hospital     Patient here for hospital f/u due to dizziness, per patient states dizziness is better since hospital but still having dizziness to \"left side\" for the past 3 weeks        History of Present Illness  Elvia is a 74 y.o. female presenting today for follow-up of: Chronic dizziness/vertigo in exacerbation    She felt a spinning sensation associated with nausea.  Symptoms were worse with head movement and body movement and improved when resting.  She had evidence of fast beating nystagmus to the left on exam.  CT/CTA head and neck nonacute.  MRI brain nonacute.  She was continued on home aspirin and statin.  Vestibular therapy was recommended at follow-up.  Her first evaluation is 11/22/2024.    She remains on aspirin and statin for stroke prevention.  She has been evaluated in the past for expressive aphasia-workup was negative.  Has not had any further events.    She would like a refill of the scopolamine patch.  This is helpful.    Current Outpatient Medications   Medication Sig Dispense Refill    scopolamine (TRANSDERM-SCOP) transdermal patch Place 1 patch onto the skin every 72 hours 5 patch 1    amLODIPine (NORVASC) 5 MG tablet Take 1 tablet by mouth daily      EQ ASPIRIN ADULT LOW DOSE 81 MG EC tablet TAKE 1 TABLET BY MOUTH ONCE DAILY      sertraline (ZOLOFT) 50 MG tablet Take 2 tablets by mouth daily      atorvastatin (LIPITOR) 40 MG tablet       levothyroxine (SYNTHROID) 88 MCG tablet Take 1 tablet by mouth Daily       No current facility-administered medications for this visit.     /70   Pulse 77   Wt 63 kg (139 lb)   SpO2 99%   BMI 23.49 kg/m²   Physical Exam:  Mental Status   Orientation: oriented to person, oriented to place, oriented to problem, and oriented to time    Mood/affectappropriate mood and appropriate affect   Memory/Other: recent memory intact, remote memory intact, fund

## 2024-11-14 RX ORDER — SCOLOPAMINE TRANSDERMAL SYSTEM 1 MG/1
1 PATCH, EXTENDED RELEASE TRANSDERMAL
Qty: 5 PATCH | Refills: 1 | Status: SHIPPED | OUTPATIENT
Start: 2024-11-14

## 2024-11-22 ENCOUNTER — HOSPITAL ENCOUNTER (OUTPATIENT)
Dept: PHYSICAL THERAPY | Age: 74
Setting detail: THERAPIES SERIES
Discharge: HOME | End: 2024-11-22
Payer: COMMERCIAL

## 2024-11-22 PROCEDURE — 97162 PT EVAL MOD COMPLEX 30 MIN: CPT

## 2024-11-22 PROCEDURE — 97112 NEUROMUSCULAR REEDUCATION: CPT

## 2024-11-22 NOTE — FLOWSHEET NOTE
Exercise  [] Modalities:  [x] Therapeutic Activity     [] Ultrasound  [] Estim  [x] Gait Training      [] Cervical Traction [] Lumbar Traction  [x] Neuromuscular Re-education    [] Cold/hotpack [] Iontophoresis   [x] Instruction in HEP      [] Vasopneumatic   [] Dry Needling    [x] Manual Therapy               [x] Canalith repositioning     Electronically signed by:  Doris Cassidy PT, DPT 11/22/2024, 12:06 PM

## 2024-11-22 NOTE — PROGRESS NOTES
Physical Therapy: Initial Evaluation    Patient: Elvia Kay (74 y.o. female)   Examination Date: 2024  Plan of Care Certification Period:2024 to        :  1950 ;    Confirmed: Y MRN: [id number]  CSN: [id number]   Insurance: Payor: [employer] DUAL BENEFITS / Plan: [employer] DUAL / Product Type: *No Product type* /   Insurance ID: [id number] - (Medicare Managed) Secondary Insurance (if applicable):    Referring Physician: Wayne Ho DO Beraht Thapa, DO   PCP: Wayne Ho DO Visits to Date/Visits Approved:   /      No Show/Cancelled Appts:   /       Medical Diagnosis: Vertigo [R42] Vertigo  Treatment Diagnosis: impaired balance, dizziness     PERTINENT MEDICAL HISTORY   Patient Assessed for Rehabilitation Services: Yes       Medical History: Chart Reviewed: Yes   Past Medical History:   Diagnosis Date    Depression     Hyperlipidemia     Hypertension     Thyroid disease      Surgical History:   Past Surgical History:   Procedure Laterality Date    CHOLECYSTECTOMY         Medications:   Current Outpatient Medications:     scopolamine (TRANSDERM-SCOP) transdermal patch, Place 1 patch onto the skin every 72 hours, Disp: 5 patch, Rfl: 1    amLODIPine (NORVASC) 5 MG tablet, Take 1 tablet by mouth daily, Disp: , Rfl:     EQ ASPIRIN ADULT LOW DOSE 81 MG EC tablet, TAKE 1 TABLET BY MOUTH ONCE DAILY, Disp: , Rfl:     sertraline (ZOLOFT) 50 MG tablet, Take 2 tablets by mouth daily, Disp: , Rfl:     atorvastatin (LIPITOR) 40 MG tablet, , Disp: , Rfl:     levothyroxine (SYNTHROID) 88 MCG tablet, Take 1 tablet by mouth Daily, Disp: , Rfl:   Allergies: Patient has no known allergies.      SUBJECTIVE EXAMINATION     History obtained from:: Patient, Chart Review,           Subjective History:    Pt presents to therapy with dizziness which is chronic in nature. Was just in the hospital for it late October but is having dizziness “to the L” for the last few weeks. Has improved some but

## 2024-11-22 NOTE — PLAN OF CARE
Outpatient Physical Therapy           [place]           [x] Phone: [phone]   Fax: [phone]  [place]           [] Phone: [phone]   Fax: [phone]     To:  Wayne Ho, DO   From: Doris Cassidy, PT, DPT     Patient: Elvia Kay       : 1950  Diagnosis:  Diagnosis: Vertigo  Treatment Diagnosis: impaired balance, dizziness  Date: 2024    Physical Therapy Certification/Re-Certification Form  Dear Dr. Ho,  The following patient has been evaluated for physical therapy services and for therapy to continue, insurance requires physician review of the treatment plan initially and every 90 days. Please review the attached evaluation and/or summary of the patient's plan of care, and verify that you agree therapy should continue by signing the attached document and sending it back to our office.    Assessment:    Assessment: Pt is a 74-year-old female who presents to therapy after recent hospitalization in late October for acute on chronic dizziness and balance impairment. Upon assessment, pt does not present with positive test during BL [place]-Hallpike and reported more unsteady feeling with bed mobility than any room spinning dizziness. Pt does present with impairments and symptoms during oculomotor and VOR testing and impaired balance during VSR testing indicating vestibular hypofunction. Pt would benefit from skilled therapy interventions to address listed impairments, progress toward goal completion, and improve ADL/IADL status. PT also warranted to reduce risk for further injury or decline.  Patient agrees with established plan of care and assisted in the development of their short term and long term goals. Patient had no adverse reaction with initial treatment and there are no barriers to learning.  Learning preferences include demonstration, practice, and handouts.  Patient expressed understanding of HEP and appears to be motivated to participate in an active PT program

## 2024-12-03 ENCOUNTER — HOSPITAL ENCOUNTER (OUTPATIENT)
Dept: PHYSICAL THERAPY | Age: 74
Setting detail: THERAPIES SERIES
Discharge: HOME | End: 2024-12-03
Payer: COMMERCIAL

## 2024-12-03 PROCEDURE — 97112 NEUROMUSCULAR REEDUCATION: CPT

## 2024-12-03 NOTE — FLOWSHEET NOTE
Neuromuscular Re-education    [] Cold/hotpack [] Iontophoresis   [x] Instruction in HEP      [] Vasopneumatic   [] Dry Needling    [x] Manual Therapy               [x] Canalith repositioning     Electronically signed by:  Doris Cassidy PT, DPT 12/3/2024, 7:55 AM

## 2024-12-10 ENCOUNTER — HOSPITAL ENCOUNTER (OUTPATIENT)
Dept: PHYSICAL THERAPY | Age: 74
Setting detail: THERAPIES SERIES
Discharge: HOME | End: 2024-12-10
Payer: COMMERCIAL

## 2024-12-10 PROCEDURE — 97112 NEUROMUSCULAR REEDUCATION: CPT

## 2024-12-10 NOTE — FLOWSHEET NOTE
Outpatient Physical Therapy  [place]           [x] Phone: [phone]   Fax: [phone]  [place]           [] Phone: [phone]   Fax: [phone]        Physical Therapy Daily Treatment Note  Date:  12/10/2024    Patient Name:  Elvia Kay    :  1950  MRN: [id number]  Restrictions/Precautions: n/a  Diagnosis:  Vertigo  Treatment Diagnosis:  impaired balance, dizziness  Insurance/Certification information: [place] - 12 visits before precert  Referring Physician:   Wayne Ho DO   Plan of care signed (Y/N):  Y  Outcome Measure: DHI: 62  Visit# / total visits:   3/10  Pain level:      0/10       Goals:     Patient goals: reduce dizziness and improve balance  Short term goals  Time Frame for Short term goals: Refer to [facility]  Long Term Goals  Time Frame for Long Term Goals: 10 visits  Pt will improve DHI to 50 or less to show subjective improvement in condition  Pt will improve DGI to 12 or more to show improved balance and aide in ADLs  Pt will march foam EC shoulder BRYNN foam with sway vs fall to show improved stability and aide in gait  Pt will report overall improvement in condition by 50% or more  Pt will improve TUG to 18 seconds or less to show improved gait independence        Summary of Evaluation:  Assessment: Pt is a 74-year-old female who presents to therapy after recent hospitalization in late October for acute on chronic dizziness and balance impairment. Upon assessment, pt does not present with positive test during BL [place]-Hallpike and reported more unsteady feeling with bed mobility than any room spinning dizziness. Pt does present with impairments and symptoms during oculomotor and VOR testing and impaired balance during VSR testing indicating vestibular hypofunction. Pt would benefit from skilled therapy interventions to address listed impairments, progress toward goal completion, and improve ADL/IADL status. PT also warranted to reduce risk for further injury or decline.

## 2024-12-17 ENCOUNTER — HOSPITAL ENCOUNTER (OUTPATIENT)
Dept: PHYSICAL THERAPY | Age: 74
Setting detail: THERAPIES SERIES
Discharge: HOME | End: 2024-12-17
Payer: COMMERCIAL

## 2024-12-17 PROCEDURE — 97112 NEUROMUSCULAR REEDUCATION: CPT

## 2024-12-17 NOTE — FLOWSHEET NOTE
Outpatient Physical Therapy  [place]           [x] Phone: [phone]   Fax: [phone]  [place]           [] Phone: [phone]   Fax: [phone]        Physical Therapy Daily Treatment Note  Date:  2024    Patient Name:  Elvia Kay    :  1950  MRN: [id number]  Restrictions/Precautions: n/a  Diagnosis:  Vertigo  Treatment Diagnosis:  impaired balance, dizziness  Insurance/Certification information: [place] - 12 visits before precert  Referring Physician:   Wayne Ho DO   Plan of care signed (Y/N):  Y  Outcome Measure: DHI: 62  Visit# / total visits:   4/10  Pain level:      0/10       Goals:     Patient goals: reduce dizziness and improve balance  Short term goals  Time Frame for Short term goals: Refer to [facility]  Long Term Goals  Time Frame for Long Term Goals: 10 visits  Pt will improve DHI to 50 or less to show subjective improvement in condition  Pt will improve DGI to 12 or more to show improved balance and aide in ADLs  Pt will march foam EC shoulder BRYNN foam with sway vs fall to show improved stability and aide in gait  Pt will report overall improvement in condition by 50% or more  Pt will improve TUG to 18 seconds or less to show improved gait independence        Summary of Evaluation:  Assessment: Pt is a 74-year-old female who presents to therapy after recent hospitalization in late October for acute on chronic dizziness and balance impairment. Upon assessment, pt does not present with positive test during [facility] [place] and reported more unsteady feeling with bed mobility than any room spinning dizziness. Pt does present with impairments and symptoms during oculomotor and VOR testing and impaired balance during VSR testing indicating vestibular hypofunction. Pt would benefit from skilled therapy interventions to address listed impairments, progress toward goal completion, and improve ADL/IADL status. PT also warranted to reduce risk for further injury or decline.

## 2024-12-24 ENCOUNTER — HOSPITAL ENCOUNTER (OUTPATIENT)
Dept: PHYSICAL THERAPY | Age: 74
Setting detail: THERAPIES SERIES
Discharge: HOME | End: 2024-12-24
Payer: COMMERCIAL

## 2024-12-24 PROCEDURE — 97112 NEUROMUSCULAR REEDUCATION: CPT

## 2024-12-24 NOTE — FLOWSHEET NOTE
Outpatient Physical Therapy  [place]           [x] Phone: [phone]   Fax: [phone]  [place]           [] Phone: [phone]   Fax: [phone]        Physical Therapy Daily Treatment Note  Date:  2024    Patient Name:  Elvia Kay    :  1950  MRN: [id number]  Restrictions/Precautions: n/a  Diagnosis:  Vertigo  Treatment Diagnosis:  impaired balance, dizziness  Insurance/Certification information: [place] - 12 visits before precert  Referring Physician:   Wayne Ho DO   Plan of care signed (Y/N):  Y  Outcome Measure: DHI: 62  Visit# / total visits:   5/10  Pain level:      0/10 dizziness; 4/10 pain in LE's           Goals:     Patient goals: reduce dizziness and improve balance  Short term goals  Time Frame for Short term goals: Refer to [facility]  Long Term Goals  Time Frame for Long Term Goals: 10 visits  Pt will improve DHI to 50 or less to show subjective improvement in condition  Pt will improve DGI to 12 or more to show improved balance and aide in ADLs  Pt will march foam EC shoulder BRYNN foam with sway vs fall to show improved stability and aide in gait  Pt will report overall improvement in condition by 50% or more  Pt will improve TUG to 18 seconds or less to show improved gait independence        Summary of Evaluation:  Assessment: Pt is a 74-year-old female who presents to therapy after recent hospitalization in late October for acute on chronic dizziness and balance impairment. Upon assessment, pt does not present with positive test during [facility] [place] and reported more unsteady feeling with bed mobility than any room spinning dizziness. Pt does present with impairments and symptoms during oculomotor and VOR testing and impaired balance during VSR testing indicating vestibular hypofunction. Pt would benefit from skilled therapy interventions to address listed impairments, progress toward goal completion, and improve ADL/IADL status. PT also warranted to reduce risk

## 2024-12-31 ENCOUNTER — HOSPITAL ENCOUNTER (OUTPATIENT)
Dept: PHYSICAL THERAPY | Age: 74
Setting detail: THERAPIES SERIES
Discharge: HOME | End: 2024-12-31
Payer: COMMERCIAL

## 2024-12-31 PROCEDURE — 97112 NEUROMUSCULAR REEDUCATION: CPT

## 2024-12-31 NOTE — FLOWSHEET NOTE
Outpatient Physical Therapy  [place]           [x] Phone: [phone]   Fax: [phone]  [place]           [] Phone: [phone]   Fax: [phone]        Physical Therapy Daily Treatment Note  Date:  2024    Patient Name:  Elvia Kay    :  1950  MRN: [id number]  Restrictions/Precautions: n/a  Diagnosis:  Vertigo  Treatment Diagnosis:  impaired balance, dizziness  Insurance/Certification information: [place] - 12 visits before precert  Referring Physician:   Wayne Ho DO   Plan of care signed (Y/N):  Y  Outcome Measure: DHI: 52  Visit# / total visits:   6/10  Pain level:      0/10 dizziness; 4/10 pain in LE's           Goals:     Patient goals: reduce dizziness and improve balance MET   Short term goals  Time Frame for Short term goals: Refer to [facility]  Long Term Goals  Time Frame for Long Term Goals: 10 visits  Pt will improve DHI to 50 or less to show subjective improvement in condition: Almost MET   Pt will improve DGI to 12 or more to show improved balance and aide in ADLs MET   Pt will march foam EC shoulder BRYNN foam with sway vs fall to show improved stability and aide in gait MET    Pt will report overall improvement in condition by 50% or more: MET   Pt will improve TUG to 18 seconds or less to show improved gait independence MET         Summary of Evaluation:  Assessment: Pt is a 74-year-old female who presents to therapy after recent hospitalization in late October for acute on chronic dizziness and balance impairment. Upon assessment, pt does not present with positive test during [facility] [place] and reported more unsteady feeling with bed mobility than any room spinning dizziness. Pt does present with impairments and symptoms during oculomotor and VOR testing and impaired balance during VSR testing indicating vestibular hypofunction. Pt would benefit from skilled therapy interventions to address listed impairments, progress toward goal  Wartpeel Counseling:  I discussed with the patient the risks of [place]peel including but not limited to erythema, scaling, itching, weeping, crusting, and pain.

## 2024-12-31 NOTE — DISCHARGE SUMMARY
Outpatient Physical Therapy           [place]           [x] Phone: [phone]   Fax: [phone]  [place]           [] Phone: [phone]   Fax: [phone]      To: Wayne Ho, DO     From: Doris Cassidy, PT, DPT     Patient: Elvia Kay         : 1950  Diagnosis:  Vertigo [R42]        Treatment Diagnosis:    impaired balance, dizziness    Date: 2024  []  Progress Note                [x]  Discharge Note    Evaluation Date:  24  Total Visits to date:   6 Cancels/No-shows to date:  0    Subjective:   Pt notes she is doing better today with less dizziness. Her dizziness is mostly gone and it only comes once and a while now. She feels over 50% improvement since eval. Denies any falls and feels her balance is a lot better than on eval. Her L knee is bothering her today but she does get a shot in it Monday. She feels comfortable with continuing with her HEP at home now after today.   DHI: 52      Plan of Care/Treatment to date:  [x] Therapeutic Exercise    [] Modalities:  [x] Therapeutic Activity     [] Ultrasound  [] Electrical Stimulation  [x] Gait Training      [] Cervical Traction   [] Lumbar Traction  [x] Neuromuscular Re-education  [] Cold/hotpack [] Iontophoresis  [x] Instruction in HEP      Other:  [x] Manual Therapy       []  Vasopneumatic  [x] Canalith repositioning       []   Dry Needle Therapy                      Objective/Significant Findings At Last Visit/Comments:    DGI:   TUG: 15.23 seconds SPC  March foam EC shoulder BRYNN foam: sway but no fall or external assistance needed to correct LOB    Assessment:   Pt has shown good progress since therapy start with overall reduced dizziness and improved balance. She has met almost all of her goals at this time other than 2 points less on DHI but it did improve since eval. She feels comfortable after today continuing with HEP now. PT educated pt on continuation of HEP after discharge for max benefits and reduced

## 2025-01-13 ENCOUNTER — OFFICE VISIT (OUTPATIENT)
Dept: ORTHOPEDIC SURGERY | Age: 75
End: 2025-01-13
Payer: COMMERCIAL

## 2025-01-13 VITALS — OXYGEN SATURATION: 96 % | HEART RATE: 66 BPM | BODY MASS INDEX: 23.49 KG/M2 | HEIGHT: 65 IN

## 2025-01-13 DIAGNOSIS — M17.12 PRIMARY OSTEOARTHRITIS OF LEFT KNEE: Primary | ICD-10-CM

## 2025-01-13 PROCEDURE — 20610 DRAIN/INJ JOINT/BURSA W/O US: CPT | Performed by: PHYSICIAN ASSISTANT

## 2025-01-13 RX ORDER — TRIAMCINOLONE ACETONIDE 40 MG/ML
40 INJECTION, SUSPENSION INTRA-ARTICULAR; INTRAMUSCULAR ONCE
Status: COMPLETED | OUTPATIENT
Start: 2025-01-13 | End: 2025-01-13

## 2025-01-13 RX ADMIN — TRIAMCINOLONE ACETONIDE 40 MG: 40 INJECTION, SUSPENSION INTRA-ARTICULAR; INTRAMUSCULAR at 13:51

## 2025-01-13 ASSESSMENT — ENCOUNTER SYMPTOMS
GASTROINTESTINAL NEGATIVE: 1
RESPIRATORY NEGATIVE: 1
EYES NEGATIVE: 1

## 2025-01-13 NOTE — PATIENT INSTRUCTIONS
Continue weight-bearing as tolerated.  Continue range of motion exercises as instructed.  Ice and elevate as needed.  Tylenol or Motrin for pain.  Injection given in left knee toda

## 2025-01-13 NOTE — PROGRESS NOTES
Review of Systems   Constitutional: Negative.    HENT: Negative.     Eyes: Negative.    Respiratory: Negative.     Cardiovascular: Negative.    Gastrointestinal: Negative.    Genitourinary: Negative.    Musculoskeletal:  Positive for arthralgias, joint swelling and myalgias.   Skin: Negative.  Negative for rash and wound.   Neurological: Negative.    Psychiatric/Behavioral: Negative.       Current updated HPI 1/13/2025: Elvia Kay is a 74-year-old female who returns to the office today for repeat injection in her left knee.  She states that since I last saw her she had an episode of vertigo and she was in the hospital for 5 days.  She did not have any falls on her knee however.    Prior HPI: Elvia Kay is a 74-year-old female well-known to this office for arthritic knees for which she receives injections periodically.  Today she would like to get both knees injected as both her left and her right knee are bothering her significantly today.  She rates her pain at a 7/10.      Past Medical History:   Diagnosis Date    Depression     Hyperlipidemia     Hypertension     Thyroid disease        Past Surgical History:   Procedure Laterality Date    CHOLECYSTECTOMY         Family History   Problem Relation Age of Onset    Suicide Mother     Heart Disease Father     Lung Cancer Maternal Grandfather        Social History     Socioeconomic History    Marital status: Single     Spouse name: None    Number of children: None    Years of education: None    Highest education level: None   Tobacco Use    Smoking status: Never    Smokeless tobacco: Never   Substance and Sexual Activity    Alcohol use: Yes     Comment: rarely    Drug use: No     Social Determinants of Health     Financial Resource Strain: Low Risk  (11/1/2024)    Overall Financial Resource Strain (CARDIA)     Difficulty of Paying Living Expenses: Not hard at all   Food Insecurity: No Food Insecurity (11/1/2024)    Hunger Vital Sign     Worried About

## 2025-01-13 NOTE — PROGRESS NOTES
Patient returns with left knee pain. Patient states pain is constant on the back of knee and medial side of knee. Has been using a cane and rates her pain today a 6/10.

## 2025-01-31 ENCOUNTER — OFFICE VISIT (OUTPATIENT)
Dept: FAMILY MEDICINE CLINIC | Age: 75
End: 2025-01-31

## 2025-01-31 VITALS
WEIGHT: 140.3 LBS | BODY MASS INDEX: 23.38 KG/M2 | RESPIRATION RATE: 16 BRPM | OXYGEN SATURATION: 98 % | DIASTOLIC BLOOD PRESSURE: 66 MMHG | SYSTOLIC BLOOD PRESSURE: 128 MMHG | HEART RATE: 72 BPM | HEIGHT: 65 IN

## 2025-01-31 DIAGNOSIS — I10 ESSENTIAL HYPERTENSION: ICD-10-CM

## 2025-01-31 DIAGNOSIS — Z86.73 HISTORY OF CVA IN ADULTHOOD: ICD-10-CM

## 2025-01-31 DIAGNOSIS — R74.01 ELEVATION OF LEVELS OF LIVER TRANSAMINASE LEVELS: ICD-10-CM

## 2025-01-31 DIAGNOSIS — R25.2 MUSCLE CRAMPS: ICD-10-CM

## 2025-01-31 DIAGNOSIS — Z00.00 WELCOME TO MEDICARE PREVENTIVE VISIT: Primary | ICD-10-CM

## 2025-01-31 RX ORDER — HYDROGEN PEROXIDE 2.65 ML/100ML
81 LIQUID ORAL; TOPICAL DAILY
Qty: 30 TABLET | Refills: 3 | Status: SHIPPED | OUTPATIENT
Start: 2025-01-31

## 2025-01-31 ASSESSMENT — PATIENT HEALTH QUESTIONNAIRE - PHQ9
4. FEELING TIRED OR HAVING LITTLE ENERGY: SEVERAL DAYS
3. TROUBLE FALLING OR STAYING ASLEEP: SEVERAL DAYS
9. THOUGHTS THAT YOU WOULD BE BETTER OFF DEAD, OR OF HURTING YOURSELF: NOT AT ALL
SUM OF ALL RESPONSES TO PHQ QUESTIONS 1-9: 2
7. TROUBLE CONCENTRATING ON THINGS, SUCH AS READING THE NEWSPAPER OR WATCHING TELEVISION: NOT AT ALL
5. POOR APPETITE OR OVEREATING: NOT AT ALL
SUM OF ALL RESPONSES TO PHQ9 QUESTIONS 1 & 2: 0
6. FEELING BAD ABOUT YOURSELF - OR THAT YOU ARE A FAILURE OR HAVE LET YOURSELF OR YOUR FAMILY DOWN: NOT AT ALL
10. IF YOU CHECKED OFF ANY PROBLEMS, HOW DIFFICULT HAVE THESE PROBLEMS MADE IT FOR YOU TO DO YOUR WORK, TAKE CARE OF THINGS AT HOME, OR GET ALONG WITH OTHER PEOPLE: NOT DIFFICULT AT ALL
SUM OF ALL RESPONSES TO PHQ QUESTIONS 1-9: 2
SUM OF ALL RESPONSES TO PHQ QUESTIONS 1-9: 2
1. LITTLE INTEREST OR PLEASURE IN DOING THINGS: NOT AT ALL
8. MOVING OR SPEAKING SO SLOWLY THAT OTHER PEOPLE COULD HAVE NOTICED. OR THE OPPOSITE, BEING SO FIGETY OR RESTLESS THAT YOU HAVE BEEN MOVING AROUND A LOT MORE THAN USUAL: NOT AT ALL
SUM OF ALL RESPONSES TO PHQ QUESTIONS 1-9: 2
2. FEELING DOWN, DEPRESSED OR HOPELESS: NOT AT ALL

## 2025-01-31 NOTE — PROGRESS NOTES
2/8/2025    Elvia Kay    Chief Complaint   Patient presents with    3 Month Follow-Up     3 month      Medicare AWV     Agreeable to AWV.     Flu Vaccine     Agreeable to flu vaccine.     Leg Pain     Bilateral leg pain.     Bleeding/Bruising     Bruising on arms.        HPI  Elvia is a 75 y.o. female with a PMHx as listed below who presents today for follow up on chronic conditions.     Known arthritis in knees, she feels her arthritis is worsening and going down her legs. Onset for close to one week. She has tried icy hot minimal help.     Dizziness much improved with PT.   History of Present Illness        1. Welcome to Medicare preventive visit    2. Muscle cramps    3. History of CVA in adulthood    4. Essential hypertension    5. Elevation of levels of liver transaminase levels             REVIEW OF SYMPTOMS    Review of Systems   Constitutional:  Negative for chills and fatigue.   HENT:  Negative for congestion and sore throat.    Respiratory:  Negative for shortness of breath and wheezing.    Cardiovascular:  Negative for chest pain and palpitations.   Gastrointestinal:  Negative for abdominal pain and nausea.   Genitourinary:  Negative for frequency and urgency.   Neurological:  Negative for light-headedness.       PAST MEDICAL HISTORY  Past Medical History:   Diagnosis Date    Depression     Hyperlipidemia     Hypertension     Thyroid disease        FAMILY HISTORY  Family History   Problem Relation Age of Onset    Suicide Mother     Heart Disease Father     Lung Cancer Maternal Grandfather        SOCIAL HISTORY  Social History     Socioeconomic History    Marital status: Single     Spouse name: None    Number of children: None    Years of education: None    Highest education level: None   Tobacco Use    Smoking status: Never    Smokeless tobacco: Never   Substance and Sexual Activity    Alcohol use: Yes     Comment: rarely    Drug use: No     Social Determinants of Health     Financial Resource 
128/66   Site: Left Upper Arm   Position: Sitting   Cuff Size: Medium Adult   Pulse: 72   Resp: 16   SpO2: 98%   Weight: 63.6 kg (140 lb 4.8 oz)   Height: 1.638 m (5' 4.5\")      Body mass index is 23.71 kg/m².                No Known Allergies  Prior to Visit Medications    Medication Sig Taking? Authorizing Provider   EQ ASPIRIN ADULT LOW DOSE 81 MG EC tablet Take 1 tablet by mouth daily Yes Wayne Ho DO   amLODIPine (NORVASC) 5 MG tablet Take 1 tablet by mouth daily Yes Zoey Thurman MD   sertraline (ZOLOFT) 50 MG tablet Take 2 tablets by mouth daily Yes Zoey Thurman MD   atorvastatin (LIPITOR) 40 MG tablet  Yes Zoey Thurman MD   levothyroxine (SYNTHROID) 88 MCG tablet Take 1 tablet by mouth Daily Yes Provider, Historical, MD       CareTeam (Including outside providers/suppliers regularly involved in providing care):   Patient Care Team:  Wayne Ho DO as PCP - General (Family Medicine)  Wayne Ho DO as PCP - Empaneled Provider     Recommendations for Preventive Services Due: see orders and patient instructions/AVS.  Recommended screening schedule for the next 5-10 years is provided to the patient in written form: see Patient Instructions/AVS.     Reviewed and updated this visit:  Tobacco  Allergies  Meds  Problems  Med Hx  Surg Hx  Soc Hx  Fam Hx

## 2025-02-01 LAB
ALBUMIN SERPL-MCNC: 4.5 G/DL (ref 3.4–5)
ALBUMIN/GLOB SERPL: 2 {RATIO} (ref 1.1–2.2)
ALP SERPL-CCNC: 65 U/L (ref 40–129)
ALT SERPL-CCNC: 33 U/L (ref 10–40)
ANION GAP SERPL CALCULATED.3IONS-SCNC: 12 MMOL/L (ref 3–16)
AST SERPL-CCNC: 24 U/L (ref 15–37)
BILIRUB SERPL-MCNC: <0.2 MG/DL (ref 0–1)
BUN SERPL-MCNC: 17 MG/DL (ref 7–20)
CALCIUM SERPL-MCNC: 10.3 MG/DL (ref 8.3–10.6)
CHLORIDE SERPL-SCNC: 104 MMOL/L (ref 99–110)
CHOLEST SERPL-MCNC: 295 MG/DL (ref 0–199)
CO2 SERPL-SCNC: 27 MMOL/L (ref 21–32)
CREAT SERPL-MCNC: 0.8 MG/DL (ref 0.6–1.2)
FERRITIN SERPL IA-MCNC: 270 NG/ML (ref 15–150)
GFR SERPLBLD CREATININE-BSD FMLA CKD-EPI: 76 ML/MIN/{1.73_M2}
GLUCOSE SERPL-MCNC: 83 MG/DL (ref 70–99)
HDLC SERPL-MCNC: 57 MG/DL (ref 40–60)
LDLC SERPL CALC-MCNC: 193 MG/DL
MAGNESIUM SERPL-MCNC: 2.17 MG/DL (ref 1.8–2.4)
POTASSIUM SERPL-SCNC: 4.1 MMOL/L (ref 3.5–5.1)
PROT SERPL-MCNC: 6.7 G/DL (ref 6.4–8.2)
SODIUM SERPL-SCNC: 143 MMOL/L (ref 136–145)
TRIGL SERPL-MCNC: 223 MG/DL (ref 0–150)
TSH SERPL DL<=0.005 MIU/L-ACNC: 1.97 UIU/ML (ref 0.27–4.2)
VLDLC SERPL CALC-MCNC: 45 MG/DL

## 2025-02-06 DIAGNOSIS — E78.2 MODERATE MIXED HYPERLIPIDEMIA NOT REQUIRING STATIN THERAPY: Primary | ICD-10-CM

## 2025-02-06 NOTE — RESULT ENCOUNTER NOTE
Elvia's cholesterol is very high. Confirm she is taking her atorvastatin. If so recommend starting zetia in addition.

## 2025-02-07 RX ORDER — EZETIMIBE 10 MG/1
10 TABLET ORAL DAILY
Qty: 30 TABLET | Refills: 3 | Status: SHIPPED | OUTPATIENT
Start: 2025-02-07

## 2025-02-07 NOTE — TELEPHONE ENCOUNTER
Patient was scheduled Recent PHQ 2/9 Score    PHQ 2:  PHQ 2 Score Adult PHQ 2 Score Adult PHQ 2 Interpretation Little interest or pleasure in activity?   2/7/2025   2:05 PM 0 No further screening needed 0       PHQ 9:     PHQ-2/9 Depression Screening  Little interest or pleasure in activity?: Not at all  Feeling down, depressed or hopeless?: Not at all  Initial depression screening score:: 0  PHQ2 Interpretation: No further screening needed

## 2025-02-08 ASSESSMENT — ENCOUNTER SYMPTOMS
ABDOMINAL PAIN: 0
WHEEZING: 0
SHORTNESS OF BREATH: 0
NAUSEA: 0
SORE THROAT: 0

## 2025-04-14 ENCOUNTER — OFFICE VISIT (OUTPATIENT)
Dept: ORTHOPEDIC SURGERY | Age: 75
End: 2025-04-14
Payer: MEDICARE

## 2025-04-14 ENCOUNTER — TELEPHONE (OUTPATIENT)
Dept: ORTHOPEDIC SURGERY | Age: 75
End: 2025-04-14

## 2025-04-14 VITALS
RESPIRATION RATE: 15 BRPM | HEIGHT: 65 IN | SYSTOLIC BLOOD PRESSURE: 125 MMHG | OXYGEN SATURATION: 99 % | HEART RATE: 72 BPM | WEIGHT: 145.2 LBS | BODY MASS INDEX: 24.19 KG/M2 | DIASTOLIC BLOOD PRESSURE: 71 MMHG

## 2025-04-14 DIAGNOSIS — M17.12 PRIMARY OSTEOARTHRITIS OF LEFT KNEE: Primary | ICD-10-CM

## 2025-04-14 PROCEDURE — 1090F PRES/ABSN URINE INCON ASSESS: CPT | Performed by: PHYSICIAN ASSISTANT

## 2025-04-14 PROCEDURE — G8420 CALC BMI NORM PARAMETERS: HCPCS | Performed by: PHYSICIAN ASSISTANT

## 2025-04-14 PROCEDURE — G8428 CUR MEDS NOT DOCUMENT: HCPCS | Performed by: PHYSICIAN ASSISTANT

## 2025-04-14 PROCEDURE — 3074F SYST BP LT 130 MM HG: CPT | Performed by: PHYSICIAN ASSISTANT

## 2025-04-14 PROCEDURE — 1036F TOBACCO NON-USER: CPT | Performed by: PHYSICIAN ASSISTANT

## 2025-04-14 PROCEDURE — 1125F AMNT PAIN NOTED PAIN PRSNT: CPT | Performed by: PHYSICIAN ASSISTANT

## 2025-04-14 PROCEDURE — 20610 DRAIN/INJ JOINT/BURSA W/O US: CPT | Performed by: PHYSICIAN ASSISTANT

## 2025-04-14 PROCEDURE — 3078F DIAST BP <80 MM HG: CPT | Performed by: PHYSICIAN ASSISTANT

## 2025-04-14 PROCEDURE — G8399 PT W/DXA RESULTS DOCUMENT: HCPCS | Performed by: PHYSICIAN ASSISTANT

## 2025-04-14 PROCEDURE — 1123F ACP DISCUSS/DSCN MKR DOCD: CPT | Performed by: PHYSICIAN ASSISTANT

## 2025-04-14 PROCEDURE — 99213 OFFICE O/P EST LOW 20 MIN: CPT | Performed by: PHYSICIAN ASSISTANT

## 2025-04-14 RX ORDER — TRIAMCINOLONE ACETONIDE 40 MG/ML
40 INJECTION, SUSPENSION INTRA-ARTICULAR; INTRAMUSCULAR ONCE
Status: COMPLETED | OUTPATIENT
Start: 2025-04-14 | End: 2025-04-14

## 2025-04-14 RX ADMIN — TRIAMCINOLONE ACETONIDE 40 MG: 40 INJECTION, SUSPENSION INTRA-ARTICULAR; INTRAMUSCULAR at 13:53

## 2025-04-14 NOTE — PROGRESS NOTES
Patient seen in office today for: Bilateral knee pain     Date of last injection:   Left 01/13/2025  Right 05/21/2024    Patient reports 8 /10 pain.  RICE and medication are effective to alleviate pain and reduce swelling.   Pain worsened by: Patient reports painful ROM & weight bearing. Unable to walk long periods of time.    Patient is interested in injection today

## 2025-04-14 NOTE — PATIENT INSTRUCTIONS
Continue weight-bearing as tolerated.  Continue range of motion exercises as instructed.  Ice and elevate as needed.  Tylenol or Motrin for pain.  Injection given into the Left knee.  Follow up in 3 months.   Call if any issues.    We are committed to providing you the best care possible.  If you receive a survey after visiting one of our offices, please take time to share your experience concerning your physician office visit.  These surveys are confidential and no health information about you is shared.  We are eager to improve for you and we are counting on your feedback to help make that happen.

## 2025-04-14 NOTE — TELEPHONE ENCOUNTER
Left knee was drained and given an injection today.   Pt did not tolerate well. Became clammy and light headed.   Pt was sitting in chair during draining and injection, due to reaction Pt was moved onto bed by Patrick.

## 2025-04-15 ASSESSMENT — ENCOUNTER SYMPTOMS
GASTROINTESTINAL NEGATIVE: 1
RESPIRATORY NEGATIVE: 1
EYES NEGATIVE: 1

## 2025-04-15 NOTE — PROGRESS NOTES
Review of Systems   Constitutional: Negative.    HENT: Negative.     Eyes: Negative.    Respiratory: Negative.     Cardiovascular: Negative.    Gastrointestinal: Negative.    Genitourinary: Negative.    Musculoskeletal:  Positive for arthralgias, joint swelling and myalgias.   Skin: Negative.  Negative for rash and wound.   Neurological: Negative.    Psychiatric/Behavioral: Negative.       Current updated HPI 4/14/2025: Elvia Kay is a 75-year-old female that returns to the office today with complaints of bilateral knee pain with the left hurting worse than the right.  She has swelling on both knees and wondered if I could drain her knees.      Previous HPI 1/13/2025: Elvia Kay is a 74-year-old female who returns to the office today for repeat injection in her left knee.  She states that since I last saw her she had an episode of vertigo and she was in the hospital for 5 days.  She did not have any falls on her knee however.    Prior HPI: Elvia Kay is a 74-year-old female well-known to this office for arthritic knees for which she receives injections periodically.  Today she would like to get both knees injected as both her left and her right knee are bothering her significantly today.  She rates her pain at a 7/10.      Past Medical History:   Diagnosis Date    Depression     Hyperlipidemia     Hypertension     Thyroid disease        Past Surgical History:   Procedure Laterality Date    CHOLECYSTECTOMY         Family History   Problem Relation Age of Onset    Suicide Mother     Heart Disease Father     Lung Cancer Maternal Grandfather        Social History     Socioeconomic History    Marital status: Single   Tobacco Use    Smoking status: Never    Smokeless tobacco: Never   Substance and Sexual Activity    Alcohol use: Yes     Comment: rarely    Drug use: No    Sexual activity: Not Currently     Social Drivers of Health     Financial Resource Strain: Low Risk  (11/1/2024)    Overall

## 2025-05-05 ENCOUNTER — TELEPHONE (OUTPATIENT)
Dept: FAMILY MEDICINE CLINIC | Age: 75
End: 2025-05-05

## 2025-05-05 ENCOUNTER — OFFICE VISIT (OUTPATIENT)
Dept: FAMILY MEDICINE CLINIC | Age: 75
End: 2025-05-05
Payer: MEDICARE

## 2025-05-05 VITALS
OXYGEN SATURATION: 98 % | BODY MASS INDEX: 23.99 KG/M2 | HEART RATE: 82 BPM | WEIGHT: 144 LBS | HEIGHT: 65 IN | DIASTOLIC BLOOD PRESSURE: 76 MMHG | SYSTOLIC BLOOD PRESSURE: 110 MMHG

## 2025-05-05 DIAGNOSIS — Z86.73 HISTORY OF CVA IN ADULTHOOD: ICD-10-CM

## 2025-05-05 DIAGNOSIS — R29.90 STROKE-LIKE SYMPTOMS: Primary | ICD-10-CM

## 2025-05-05 PROCEDURE — G8420 CALC BMI NORM PARAMETERS: HCPCS

## 2025-05-05 PROCEDURE — 1036F TOBACCO NON-USER: CPT

## 2025-05-05 PROCEDURE — 1090F PRES/ABSN URINE INCON ASSESS: CPT

## 2025-05-05 PROCEDURE — 3074F SYST BP LT 130 MM HG: CPT

## 2025-05-05 PROCEDURE — 99214 OFFICE O/P EST MOD 30 MIN: CPT

## 2025-05-05 PROCEDURE — G8399 PT W/DXA RESULTS DOCUMENT: HCPCS

## 2025-05-05 PROCEDURE — 3078F DIAST BP <80 MM HG: CPT

## 2025-05-05 PROCEDURE — 1159F MED LIST DOCD IN RCRD: CPT

## 2025-05-05 PROCEDURE — 1160F RVW MEDS BY RX/DR IN RCRD: CPT

## 2025-05-05 PROCEDURE — G8427 DOCREV CUR MEDS BY ELIG CLIN: HCPCS

## 2025-05-05 PROCEDURE — 1123F ACP DISCUSS/DSCN MKR DOCD: CPT

## 2025-05-05 ASSESSMENT — ENCOUNTER SYMPTOMS
GASTROINTESTINAL NEGATIVE: 1
RESPIRATORY NEGATIVE: 1

## 2025-05-05 NOTE — PATIENT INSTRUCTIONS
Central Scheduling Phone Number [phone] to schedule MRI of brain and CTA of neck       [facility] - Nghia Pollack MD  [address] [place] [address]  Phone: [phone]

## 2025-05-05 NOTE — TELEPHONE ENCOUNTER
PT CALLED IN STATING THAT SHE THINKS SHE HAD A STROKE ON 5/3. SHE WAS OUT TO EAT WITH HER BOYFRIEND AND ALL OF A SUDDEN SHE COULDN'T SPEAK OR THINK. TODAY SHE SAID SHE JUST DOESN'T FEEL LIKE HERSELF. I PLEADED WITH HER TO GO TO ER BUT REFUSED AND WANTED TO COME IN

## 2025-05-05 NOTE — PROGRESS NOTES
5/5/2025    Elvia Kay    Chief Complaint   Patient presents with    Stroke     States she was out to eat and got disoriented/confused/slurred speech and noticed the one side of her face looked off. Dizziness/off balance walking to the room.        HPI  History was obtained from patient.  Elvia is a pleasant 75 y.o. female who presents today for stroke like symptoms. She has refused to go to ED multiple times from her partner's requests to our phone staff etc.     She reports that she and her partner were out to eat for breakfast on Saturday (9-10am) and had a period of acute confusion and facial drooping on the left side of her face. Her partner reports that the symptoms lasted at least two hours. She refused to go to the ED on Saturday. She reports that she has had similar episodes in the past as well.     She notes that today she is having difficulty walking and feels dizzy, both sitting here and worse when she walks. Partner reports that she is drifting with her ambulation as well. Her memory has been a more chronic concern for her and her judgement has also been somewhat of a concern for her as well. Denies any falls, difficulty swallowing.       1. Stroke-like symptoms    2. History of CVA in adulthood         REVIEW OF SYMPTOMS    Review of Systems   Constitutional: Negative.    HENT: Negative.     Respiratory: Negative.     Cardiovascular: Negative.    Gastrointestinal: Negative.    Musculoskeletal:  Positive for arthralgias and gait problem.   Neurological:  Positive for dizziness, facial asymmetry, speech difficulty and weakness. Negative for tremors, syncope and numbness.   Psychiatric/Behavioral:  Positive for confusion and decreased concentration.        No data recorded    The 10-year ASCVD risk score (Shan BORDEN, et al., 2019) is: 19.8%    Values used to calculate the score:      Age: 75 years      Sex: Female      Is Non- : No      Diabetic: No      Tobacco smoker: No

## 2025-05-07 ENCOUNTER — APPOINTMENT (OUTPATIENT)
Dept: CT IMAGING | Age: 75
End: 2025-05-07
Payer: MEDICARE

## 2025-05-07 ENCOUNTER — HOSPITAL ENCOUNTER (OUTPATIENT)
Age: 75
Setting detail: OBSERVATION
Discharge: HOME | End: 2025-05-09
Attending: STUDENT IN AN ORGANIZED HEALTH CARE EDUCATION/TRAINING PROGRAM | Admitting: STUDENT IN AN ORGANIZED HEALTH CARE EDUCATION/TRAINING PROGRAM
Payer: MEDICARE

## 2025-05-07 ENCOUNTER — TELEPHONE (OUTPATIENT)
Dept: FAMILY MEDICINE CLINIC | Age: 75
End: 2025-05-07

## 2025-05-07 DIAGNOSIS — R42 DIZZINESS: ICD-10-CM

## 2025-05-07 DIAGNOSIS — R42 VERTIGO: Primary | ICD-10-CM

## 2025-05-07 PROBLEM — G45.9 TIA (TRANSIENT ISCHEMIC ATTACK): Status: ACTIVE | Noted: 2025-05-07

## 2025-05-07 LAB
ALBUMIN SERPL-MCNC: 4.3 G/DL (ref 3.4–5)
ALBUMIN/GLOB SERPL: 1.6 {RATIO}
ALP SERPL-CCNC: 68 U/L (ref 40–129)
ALT SERPL-CCNC: 36 U/L (ref 10–40)
AMPHET UR QL SCN: NEGATIVE
ANION GAP SERPL CALCULATED.3IONS-SCNC: 12 MMOL/L (ref 9–17)
AST SERPL-CCNC: 45 U/L (ref 15–37)
BARBITURATES UR QL SCN: NEGATIVE
BASOPHILS # BLD: 0.04 K/UL
BASOPHILS NFR BLD: 0 % (ref 0–1)
BENZODIAZ UR QL: POSITIVE
BILIRUB SERPL-MCNC: 0.4 MG/DL (ref 0–1)
BILIRUB UR QL STRIP: NEGATIVE
BUN SERPL-MCNC: 12 MG/DL (ref 7–20)
CALCIUM SERPL-MCNC: 9.3 MG/DL (ref 8.3–10.6)
CANNABINOIDS UR QL SCN: NEGATIVE
CHLORIDE SERPL-SCNC: 105 MMOL/L (ref 99–110)
CLARITY UR: CLEAR
CO2 SERPL-SCNC: 22 MMOL/L (ref 21–32)
COCAINE UR QL SCN: NEGATIVE
COLOR UR: YELLOW
CREAT SERPL-MCNC: 0.8 MG/DL (ref 0.6–1.2)
EKG ATRIAL RATE: 80 BPM
EKG DIAGNOSIS: NORMAL
EKG P AXIS: 55 DEGREES
EKG P-R INTERVAL: 116 MS
EKG Q-T INTERVAL: 400 MS
EKG QRS DURATION: 114 MS
EKG QTC CALCULATION (BAZETT): 461 MS
EKG R AXIS: 21 DEGREES
EKG T AXIS: 4 DEGREES
EKG VENTRICULAR RATE: 80 BPM
EOSINOPHIL # BLD: 0.07 K/UL
EOSINOPHILS RELATIVE PERCENT: 1 % (ref 0–3)
EPI CELLS #/AREA URNS HPF: NORMAL /HPF
ERYTHROCYTE [DISTWIDTH] IN BLOOD BY AUTOMATED COUNT: 12.7 % (ref 11.7–14.9)
FENTANYL UR QL: NEGATIVE
GFR, ESTIMATED: 77 ML/MIN/1.73M2
GLUCOSE SERPL-MCNC: 108 MG/DL (ref 74–99)
GLUCOSE UR STRIP-MCNC: NEGATIVE MG/DL
HCT VFR BLD AUTO: 45.7 % (ref 37–47)
HGB BLD-MCNC: 15.4 G/DL (ref 12.5–16)
HGB UR QL STRIP.AUTO: NEGATIVE
IMM GRANULOCYTES # BLD AUTO: 0.03 K/UL
IMM GRANULOCYTES NFR BLD: 0 %
KETONES UR STRIP-MCNC: NEGATIVE MG/DL
LEUKOCYTE ESTERASE UR QL STRIP: ABNORMAL
LYMPHOCYTES NFR BLD: 1.28 K/UL
LYMPHOCYTES RELATIVE PERCENT: 11 % (ref 24–44)
MCH RBC QN AUTO: 31.8 PG (ref 27–31)
MCHC RBC AUTO-ENTMCNC: 33.7 G/DL (ref 32–36)
MCV RBC AUTO: 94.4 FL (ref 78–100)
MONOCYTES NFR BLD: 0.8 K/UL
MONOCYTES NFR BLD: 7 % (ref 0–5)
NEUTROPHILS NFR BLD: 80 % (ref 36–66)
NEUTS SEG NFR BLD: 9.09 K/UL
NITRITE UR QL STRIP: NEGATIVE
OPIATES UR QL SCN: NEGATIVE
OXYCODONE UR QL SCN: NEGATIVE
PH UR STRIP: 6.5 [PH] (ref 5–8)
PLATELET # BLD AUTO: 316 K/UL (ref 140–440)
PMV BLD AUTO: 10.5 FL (ref 7.5–11.1)
POTASSIUM SERPL-SCNC: 4.1 MMOL/L (ref 3.5–5.1)
PROT SERPL-MCNC: 7 G/DL (ref 6.4–8.2)
PROT UR STRIP-MCNC: NEGATIVE MG/DL
RBC # BLD AUTO: 4.84 M/UL (ref 4.2–5.4)
RBC #/AREA URNS HPF: NORMAL /HPF
SODIUM SERPL-SCNC: 139 MMOL/L (ref 136–145)
SP GR UR STRIP: <1.005 (ref 1–1.03)
TEST INFORMATION: ABNORMAL
TSH SERPL DL<=0.05 MIU/L-ACNC: 1.87 UIU/ML (ref 0.27–4.2)
UROBILINOGEN UR STRIP-ACNC: 0.2 EU/DL (ref 0–1)
WBC #/AREA URNS HPF: NORMAL /HPF
WBC OTHER # BLD: 11.3 K/UL (ref 4–10.5)

## 2025-05-07 PROCEDURE — 2500000003 HC RX 250 WO HCPCS: Performed by: STUDENT IN AN ORGANIZED HEALTH CARE EDUCATION/TRAINING PROGRAM

## 2025-05-07 PROCEDURE — 6370000000 HC RX 637 (ALT 250 FOR IP): Performed by: STUDENT IN AN ORGANIZED HEALTH CARE EDUCATION/TRAINING PROGRAM

## 2025-05-07 PROCEDURE — G0378 HOSPITAL OBSERVATION PER HR: HCPCS

## 2025-05-07 PROCEDURE — 6360000002 HC RX W HCPCS: Performed by: STUDENT IN AN ORGANIZED HEALTH CARE EDUCATION/TRAINING PROGRAM

## 2025-05-07 PROCEDURE — 93005 ELECTROCARDIOGRAM TRACING: CPT | Performed by: STUDENT IN AN ORGANIZED HEALTH CARE EDUCATION/TRAINING PROGRAM

## 2025-05-07 PROCEDURE — 6360000004 HC RX CONTRAST MEDICATION: Performed by: STUDENT IN AN ORGANIZED HEALTH CARE EDUCATION/TRAINING PROGRAM

## 2025-05-07 PROCEDURE — 2580000003 HC RX 258: Performed by: STUDENT IN AN ORGANIZED HEALTH CARE EDUCATION/TRAINING PROGRAM

## 2025-05-07 PROCEDURE — 94761 N-INVAS EAR/PLS OXIMETRY MLT: CPT

## 2025-05-07 PROCEDURE — 81001 URINALYSIS AUTO W/SCOPE: CPT

## 2025-05-07 PROCEDURE — 96360 HYDRATION IV INFUSION INIT: CPT

## 2025-05-07 PROCEDURE — 70498 CT ANGIOGRAPHY NECK: CPT

## 2025-05-07 PROCEDURE — 93010 ELECTROCARDIOGRAM REPORT: CPT | Performed by: INTERNAL MEDICINE

## 2025-05-07 PROCEDURE — 80307 DRUG TEST PRSMV CHEM ANLYZR: CPT

## 2025-05-07 PROCEDURE — 84443 ASSAY THYROID STIM HORMONE: CPT

## 2025-05-07 PROCEDURE — 85025 COMPLETE CBC W/AUTO DIFF WBC: CPT

## 2025-05-07 PROCEDURE — 80053 COMPREHEN METABOLIC PANEL: CPT

## 2025-05-07 PROCEDURE — 99285 EMERGENCY DEPT VISIT HI MDM: CPT

## 2025-05-07 PROCEDURE — 70450 CT HEAD/BRAIN W/O DYE: CPT

## 2025-05-07 PROCEDURE — 96372 THER/PROPH/DIAG INJ SC/IM: CPT

## 2025-05-07 RX ORDER — SODIUM CHLORIDE 0.9 % (FLUSH) 0.9 %
20 SYRINGE (ML) INJECTION
Status: COMPLETED | OUTPATIENT
Start: 2025-05-07 | End: 2025-05-07

## 2025-05-07 RX ORDER — ASPIRIN 81 MG/1
81 TABLET, CHEWABLE ORAL DAILY
Status: DISCONTINUED | OUTPATIENT
Start: 2025-05-07 | End: 2025-05-09 | Stop reason: HOSPADM

## 2025-05-07 RX ORDER — ASPIRIN 300 MG/1
300 SUPPOSITORY RECTAL DAILY
Status: DISCONTINUED | OUTPATIENT
Start: 2025-05-07 | End: 2025-05-09 | Stop reason: HOSPADM

## 2025-05-07 RX ORDER — MECLIZINE HCL 12.5 MG 12.5 MG/1
25 TABLET ORAL ONCE
Status: COMPLETED | OUTPATIENT
Start: 2025-05-07 | End: 2025-05-07

## 2025-05-07 RX ORDER — SODIUM CHLORIDE 0.9 % (FLUSH) 0.9 %
5-40 SYRINGE (ML) INJECTION EVERY 12 HOURS SCHEDULED
Status: DISCONTINUED | OUTPATIENT
Start: 2025-05-07 | End: 2025-05-09 | Stop reason: HOSPADM

## 2025-05-07 RX ORDER — ENOXAPARIN SODIUM 100 MG/ML
40 INJECTION SUBCUTANEOUS
Status: DISCONTINUED | OUTPATIENT
Start: 2025-05-07 | End: 2025-05-09 | Stop reason: HOSPADM

## 2025-05-07 RX ORDER — SODIUM CHLORIDE 0.9 % (FLUSH) 0.9 %
5-40 SYRINGE (ML) INJECTION PRN
Status: DISCONTINUED | OUTPATIENT
Start: 2025-05-07 | End: 2025-05-09 | Stop reason: HOSPADM

## 2025-05-07 RX ORDER — SODIUM CHLORIDE 9 MG/ML
500 INJECTION, SOLUTION INTRAVENOUS PRN
Status: DISCONTINUED | OUTPATIENT
Start: 2025-05-07 | End: 2025-05-09 | Stop reason: HOSPADM

## 2025-05-07 RX ORDER — SODIUM CHLORIDE, SODIUM LACTATE, POTASSIUM CHLORIDE, AND CALCIUM CHLORIDE .6; .31; .03; .02 G/100ML; G/100ML; G/100ML; G/100ML
1000 INJECTION, SOLUTION INTRAVENOUS ONCE
Status: COMPLETED | OUTPATIENT
Start: 2025-05-07 | End: 2025-05-07

## 2025-05-07 RX ORDER — ONDANSETRON 2 MG/ML
4 INJECTION INTRAMUSCULAR; INTRAVENOUS EVERY 6 HOURS PRN
Status: DISCONTINUED | OUTPATIENT
Start: 2025-05-07 | End: 2025-05-09 | Stop reason: HOSPADM

## 2025-05-07 RX ORDER — ATORVASTATIN CALCIUM 40 MG/1
80 TABLET, FILM COATED ORAL NIGHTLY
Status: DISCONTINUED | OUTPATIENT
Start: 2025-05-07 | End: 2025-05-09 | Stop reason: HOSPADM

## 2025-05-07 RX ORDER — ONDANSETRON 4 MG/1
4 TABLET, ORALLY DISINTEGRATING ORAL EVERY 8 HOURS PRN
Status: DISCONTINUED | OUTPATIENT
Start: 2025-05-07 | End: 2025-05-09 | Stop reason: HOSPADM

## 2025-05-07 RX ORDER — IOPAMIDOL 755 MG/ML
75 INJECTION, SOLUTION INTRAVASCULAR
Status: COMPLETED | OUTPATIENT
Start: 2025-05-07 | End: 2025-05-07

## 2025-05-07 RX ORDER — POLYETHYLENE GLYCOL 3350 17 G/17G
17 POWDER, FOR SOLUTION ORAL DAILY PRN
Status: DISCONTINUED | OUTPATIENT
Start: 2025-05-07 | End: 2025-05-09 | Stop reason: HOSPADM

## 2025-05-07 RX ADMIN — ENOXAPARIN SODIUM 40 MG: 100 INJECTION SUBCUTANEOUS at 21:02

## 2025-05-07 RX ADMIN — IOPAMIDOL 75 ML: 755 INJECTION, SOLUTION INTRAVENOUS at 13:11

## 2025-05-07 RX ADMIN — SODIUM CHLORIDE, POTASSIUM CHLORIDE, SODIUM LACTATE AND CALCIUM CHLORIDE 1000 ML: 600; 310; 30; 20 INJECTION, SOLUTION INTRAVENOUS at 11:33

## 2025-05-07 RX ADMIN — SODIUM CHLORIDE, PRESERVATIVE FREE 10 ML: 5 INJECTION INTRAVENOUS at 23:19

## 2025-05-07 RX ADMIN — MECLIZINE HYDROCHLORIDE 25 MG: 12.5 TABLET ORAL at 11:33

## 2025-05-07 RX ADMIN — ATORVASTATIN CALCIUM 80 MG: 40 TABLET, FILM COATED ORAL at 21:02

## 2025-05-07 RX ADMIN — ASPIRIN 81 MG: 81 TABLET, CHEWABLE ORAL at 21:07

## 2025-05-07 RX ADMIN — SODIUM CHLORIDE, PRESERVATIVE FREE 20 ML: 5 INJECTION INTRAVENOUS at 13:11

## 2025-05-07 ASSESSMENT — PAIN - FUNCTIONAL ASSESSMENT: PAIN_FUNCTIONAL_ASSESSMENT: NONE - DENIES PAIN

## 2025-05-07 NOTE — ED NOTES
Assisted up to bathroom gait steady   Back to bed updated on admission process   Family at bedside

## 2025-05-07 NOTE — ED TRIAGE NOTES
Patient reports she was seen 2 days ago for stroke like symptoms. Patient states she is just not feeling any better. States she has been feeling off balance since Saturday. Has an MRI scheduled but it is not for a couple weeks.

## 2025-05-07 NOTE — H&P
V2.0  History and Physical      Name:  Elvia Kay /Age/Sex: 1950  (75 y.o. female)   MRN & CSN:  [id number] & [id number] Encounter Date/Time: 2025 4:58 PM EDT   Location:  [address] [place] [address] PCP: Wayne Ho DO       Hospital Day: 1    Assessment and Plan:   Elvia Kay is a 75 y.o. female  who presents with Dizziness    Hospital Problems           Last Modified POA    * (Principal) Dizziness 2025 Yes    TIA (transient ischemic attack) 2025 Yes       Assessment and Plan:  TIA with slurred speech.  Last known well was Saturday noon.  NIH is 0 at the moment.  CT head and CTA negative MRI of the brain has been ordered consider neurology consultation telemetry in place NIHSS every 4 hours for 6 occurrences.  Lipid panel to be checked as well along with TSH to be considered  Benign essential hypertension on amlodipine allow permissive hypertension goal of around 185/110 for the past 24 hours and then we will go for goal of less than 140/90 by tomorrow  Hyperlipidemia on ezetimibe and Lipitor at home currently on Lipitor  Reactive leukocytosis no active signs of infection UA is positive for leukocyte esterase but WBC count 0 symptoms are not present as well I will hold off on antibiotics likely asymptomatic bacteriuria        Advance Care Planning    10 minutes were spent discussing the patient's resuscitation status, advance care planning, and end of life care with the patient and/or family/surrogate.    The patient and/or family/surrogate voluntarily agreed to participate in ACP services.    Patient's cognitive capacity: Alert and oriented X3    I answered all the patient/family questions that I could within the range and scope of the current medical situation.  We discussed the medical conditions, risks, benefits, outcomes, and goals of care at this time for the patient's medical issues at hand in the face of the patient's chronic issues and current presentation.    Summary of  AM    LEUKOCYTESUR SMALL 05/07/2025 12:34 PM    UROBILINOGEN 0.2 05/07/2025 12:34 PM    BILIRUBINUR NEGATIVE 05/07/2025 12:34 PM    BLOODU NEGATIVE 07/12/2014 07:35 AM    GLUCOSEU NEGATIVE 05/07/2025 12:34 PM    KETUA NEGATIVE 05/07/2025 12:34 PM     Urine Cultures: No results found for: \"LABURIN\"  Blood Cultures: No results found for: \"BC\"  No results found for: \"BLOODCULT2\"  Organism:   Lab Results   Component Value Date/Time    ORG ECOL 08/11/2013 02:25 PM       Imaging/Diagnostics Last 24 Hours   CTA HEAD NECK W CONTRAST  Result Date: 5/7/2025  PROCEDURE: CTA HEAD NECK W CONTRAST DATE OF EXAM:  5/7/2025 12:50 DEMOGRAPHICS: 75 years old Female INDICATION: Severe persistent vertigo. Posterior CVA? COMPARISON: CT brain dated 5/7/2025, MRI brain dated 10/26/2024. Report of CTA Head Neck dictated 10/24/2024 TECHNIQUE:  Helical images were obtained in the axial plane during the rapid administration of intravenous contrast. The exam was timed to best evaluate and opacify the arterial structures. The examination is reviewed at  soft tissue and  lung windows. 2D and 3D MIP and MPR reconstructions are performed of the target  arterial structures on an independent workstation. NORMAL SALINE FLUSH 0.9 % IV SOLN 20mL, IOPAMIDOL 76 % IV SOLN 75mL CT radiation dose optimization techniques (automated exposure control, and use of iterative reconstruction techniques, or adjustment of the mA and/or kV according to patient size) were used to limit patient radiation dose. Measurements and estimates of internal carotid artery stenosis are based on the NASCET criteria. Direct measurement calculations utilize diameter of the distal internal carotid artery as the denominator for stenosis measurement.  Limitations:  None. FINDINGS: Soft Tissues:  No significant soft tissue findings in the neck or upper chest. Upper Lungs:  No nodules or other significant lung pathology. Aortic Arch:  Bovine origin. Left vertebral artery arises directly

## 2025-05-07 NOTE — ED PROVIDER NOTES
Emergency Department Encounter    Patient: Elvia Kay  MRN: [id number]  : 1950  Date of Evaluation: 2025  ED Provider:  Ra Gamez MD    Triage Chief Complaint:   Dizziness (Patient reports she was seen 2 days ago for stroke like symptoms. Patient states she is just not feeling any better. States she has been feeling off balance since Saturday. Has an MRI scheduled but it is not for a couple weeks. )    [place]:  Elvia Kay is a 75 y.o. female with history significant for depression, hyperlipidemia, hypertension, that presents for the sensation of feeling off balance.  The patient's symptoms started on , 4 days before my assessment, when per the patient's partner's report, she had the insidious onset of confusion, difficulty interacting, difficulty recalling details.  The next day she was not as confused, but had the insidious onset of difficulty walking, something that she describes as feeling off balance, which is constant, with no modifying factors, not related to changes in positions.  She denies any dizziness described as vertigo or lightheadedness.  Denies any focal numbness, tingling or weakness.  No falls, no headache.  No back pain.  No difficulty for urination or bowel movements, no GI, respiratory or urinary symptoms.  No fevers or chills.  Has never had similar symptoms in the past.    Mentions that she reached out to her outpatient doctors, who requested an MRI, that she was able to schedule for 3 weeks from now.     ROS - see HPI, below listed is current ROS at time of my eval:  Systems reviewed and negative except as above.     Past Medical History:   Diagnosis Date    Depression     Hyperlipidemia     Hypertension     Thyroid disease      Past Surgical History:   Procedure Laterality Date    CHOLECYSTECTOMY       Family History   Problem Relation Age of Onset    Suicide Mother     Heart Disease Father     Lung Cancer Maternal Grandfather      Social History

## 2025-05-07 NOTE — ED NOTES
ED TO INPATIENT SBAR HANDOFF    Patient Name: Elvia Kay   Preferred Name: Elvia  : 1950  75 y.o.   Family/Caregiver Present: no   Code Status Order: Prior  PO Status: NPO:Yes  Telemetry Order: Yes  C-SSRS: Risk of Suicide: No Risk  Sitter no   Restraints:     Sepsis Risk Score Sepsis V2 Risk Score: 5    Situation  Chief Complaint   Patient presents with    Dizziness     Patient reports she was seen 2 days ago for stroke like symptoms. Patient states she is just not feeling any better. States she has been feeling off balance since Saturday. Has an MRI scheduled but it is not for a couple weeks.      Brief Description of Patient's Condition: Patient has had ongoing dizziness/\"feeling off balance\" since Saturday. Had an outpatient MRI scheduled in a couple weeks, but was concerned today about the dizziness. Patient also has slight left sided facial droop and eyelid droop that has been there per the .   Mental Status: alert  Arrived from:Home  Imaging:   CTA HEAD NECK W CONTRAST   Final Result      CT HEAD WO CONTRAST   Final Result        Abnormal labs:   Abnormal Labs Reviewed   CBC WITH AUTO DIFFERENTIAL - Abnormal; Notable for the following components:       Result Value    WBC 11.3 (*)     MCH 31.8 (*)     Neutrophils % 80 (*)     Lymphocytes % 11 (*)     Monocytes % 7 (*)     All other components within normal limits   URINALYSIS WITH REFLEX TO CULTURE - Abnormal; Notable for the following components:    Specific Gravity, UA <1.005 (*)     Leukocyte Esterase, Urine SMALL (*)     All other components within normal limits   URINE DRUG SCREEN - Abnormal; Notable for the following components:    Benzodiazepine Screen, Urine POSITIVE (*)     All other components within normal limits   COMPREHENSIVE METABOLIC PANEL - Abnormal; Notable for the following components:    Glucose 108 (*)     AST 45 (*)     All other components within normal limits       Background  Allergies: No Known  injection 20 mL (20 mLs IntraVENous Given 5/7/25 1311)   iopamidol (ISOVUE-370) 76 % injection 75 mL (75 mLs IntraVENous Given 5/7/25 1311)     Last documented pain medication administration: none  Pertinent or High Risk Medications/Drips: no   If Yes, please provide details:   Blood Product Administration: no  If Yes, please provide details:   Process Protocols/Bundles: N/A    Recommendation  Incomplete STAT orders: none  Overdue Medications: none  Patient Belongings:    Additional Comments:   If any further questions, please call Sending RN at [phone]      Admitting Unit Notification  Name of person notified and time:       Electronically signed by: Electronically signed by Vanessa Edmond RN on 5/7/2025 at 2:54 PM

## 2025-05-07 NOTE — PROGRESS NOTES
4 Eyes Skin Assessment     NAME:  Elvia Kay  YOB: 1950  MEDICAL RECORD NUMBER:  [id number]    The patient is being assessed for  Admission    I agree that at least one RN has performed a thorough Head to Toe Skin Assessment on the patient. ALL assessment sites listed below have been assessed.      Areas assessed by both nurses:    Head, Face, Ears, Shoulders, Back, Chest, Arms, Elbows, Hands, Sacrum. Buttock, Coccyx, Ischium, Legs. Feet and Heels, and Under Medical Devices         Does the Patient have a Wound? No noted wound(s)       Miguel Prevention initiated by RN: No  Wound Care Orders initiated by RN: No    Pressure Injury (Stage 3,4, Unstageable, DTI, NWPT, and Complex wounds) if present, place Wound referral order by RN under : No    New Ostomies, if present place, Ostomy referral order under : No     Nurse 1 eSignature: Electronically signed by Ana Belle RN on 5/7/25 at 4:09 PM EDT    **SHARE this note so that the co-signing nurse can place an eSignature**    Nurse 2 eSignature: Electronically signed by Marily Lanza LPN on 5/7/25 at 4:42 PM EDT

## 2025-05-07 NOTE — TELEPHONE ENCOUNTER
Call transferred from the [facility].    Patient having dizziness and not feeling right/head doesn't feel right.  Been going on since issue on Saturday discussed at appointment on 5/5 with Swetha.  Advised patient she has to go to the ER and get checked out as this is still ongoing from that issue and could be an ongoing problem.  Imaging is currently scheduled for the end of the month.  Advised she has to go to the ER and get checked out to find out what's going on.  Patient states she will go into the ER today.

## 2025-05-08 ENCOUNTER — APPOINTMENT (OUTPATIENT)
Dept: MRI IMAGING | Age: 75
End: 2025-05-08
Payer: MEDICARE

## 2025-05-08 LAB
ANION GAP SERPL CALCULATED.3IONS-SCNC: 10 MMOL/L (ref 9–17)
BUN SERPL-MCNC: 15 MG/DL (ref 7–20)
CALCIUM SERPL-MCNC: 9 MG/DL (ref 8.3–10.6)
CHLORIDE SERPL-SCNC: 107 MMOL/L (ref 99–110)
CHOLEST SERPL-MCNC: 135 MG/DL (ref 125–199)
CO2 SERPL-SCNC: 24 MMOL/L (ref 21–32)
CREAT SERPL-MCNC: 0.7 MG/DL (ref 0.6–1.2)
ERYTHROCYTE [DISTWIDTH] IN BLOOD BY AUTOMATED COUNT: 12.8 % (ref 11.7–14.9)
EST. AVERAGE GLUCOSE BLD GHB EST-MCNC: 111 MG/DL
GFR, ESTIMATED: 85 ML/MIN/1.73M2
GLUCOSE SERPL-MCNC: 105 MG/DL (ref 74–99)
HBA1C MFR BLD: 5.5 % (ref 4.2–6.3)
HCT VFR BLD AUTO: 39 % (ref 37–47)
HDLC SERPL-MCNC: 50 MG/DL
HGB BLD-MCNC: 13.3 G/DL (ref 12.5–16)
LDLC SERPL CALC-MCNC: 65 MG/DL
MAGNESIUM SERPL-MCNC: 1.9 MG/DL (ref 1.8–2.4)
MCH RBC QN AUTO: 32.2 PG (ref 27–31)
MCHC RBC AUTO-ENTMCNC: 34.1 G/DL (ref 32–36)
MCV RBC AUTO: 94.4 FL (ref 78–100)
PHOSPHATE SERPL-MCNC: 3.7 MG/DL (ref 2.5–4.9)
PLATELET # BLD AUTO: 291 K/UL (ref 140–440)
PMV BLD AUTO: 9.9 FL (ref 7.5–11.1)
POTASSIUM SERPL-SCNC: 3.5 MMOL/L (ref 3.5–5.1)
RBC # BLD AUTO: 4.13 M/UL (ref 4.2–5.4)
SODIUM SERPL-SCNC: 141 MMOL/L (ref 136–145)
TRIGL SERPL-MCNC: 101 MG/DL
WBC OTHER # BLD: 7.1 K/UL (ref 4–10.5)

## 2025-05-08 PROCEDURE — 97166 OT EVAL MOD COMPLEX 45 MIN: CPT

## 2025-05-08 PROCEDURE — 80061 LIPID PANEL: CPT

## 2025-05-08 PROCEDURE — 6370000000 HC RX 637 (ALT 250 FOR IP)

## 2025-05-08 PROCEDURE — 36415 COLL VENOUS BLD VENIPUNCTURE: CPT

## 2025-05-08 PROCEDURE — 95819 EEG AWAKE AND ASLEEP: CPT

## 2025-05-08 PROCEDURE — 84100 ASSAY OF PHOSPHORUS: CPT

## 2025-05-08 PROCEDURE — 70551 MRI BRAIN STEM W/O DYE: CPT

## 2025-05-08 PROCEDURE — 95816 EEG AWAKE AND DROWSY: CPT | Performed by: STUDENT IN AN ORGANIZED HEALTH CARE EDUCATION/TRAINING PROGRAM

## 2025-05-08 PROCEDURE — G0378 HOSPITAL OBSERVATION PER HR: HCPCS

## 2025-05-08 PROCEDURE — 99223 1ST HOSP IP/OBS HIGH 75: CPT | Performed by: PSYCHIATRY & NEUROLOGY

## 2025-05-08 PROCEDURE — 2500000003 HC RX 250 WO HCPCS: Performed by: STUDENT IN AN ORGANIZED HEALTH CARE EDUCATION/TRAINING PROGRAM

## 2025-05-08 PROCEDURE — 83735 ASSAY OF MAGNESIUM: CPT

## 2025-05-08 PROCEDURE — 96372 THER/PROPH/DIAG INJ SC/IM: CPT

## 2025-05-08 PROCEDURE — 85027 COMPLETE CBC AUTOMATED: CPT

## 2025-05-08 PROCEDURE — 94761 N-INVAS EAR/PLS OXIMETRY MLT: CPT

## 2025-05-08 PROCEDURE — 80048 BASIC METABOLIC PNL TOTAL CA: CPT

## 2025-05-08 PROCEDURE — 6360000002 HC RX W HCPCS: Performed by: STUDENT IN AN ORGANIZED HEALTH CARE EDUCATION/TRAINING PROGRAM

## 2025-05-08 PROCEDURE — 6370000000 HC RX 637 (ALT 250 FOR IP): Performed by: STUDENT IN AN ORGANIZED HEALTH CARE EDUCATION/TRAINING PROGRAM

## 2025-05-08 PROCEDURE — 92610 EVALUATE SWALLOWING FUNCTION: CPT

## 2025-05-08 PROCEDURE — 97161 PT EVAL LOW COMPLEX 20 MIN: CPT

## 2025-05-08 PROCEDURE — 83036 HEMOGLOBIN GLYCOSYLATED A1C: CPT

## 2025-05-08 RX ORDER — LORAZEPAM 0.5 MG/1
0.5 TABLET ORAL ONCE
Status: COMPLETED | OUTPATIENT
Start: 2025-05-08 | End: 2025-05-08

## 2025-05-08 RX ADMIN — ATORVASTATIN CALCIUM 80 MG: 40 TABLET, FILM COATED ORAL at 21:26

## 2025-05-08 RX ADMIN — SODIUM CHLORIDE, PRESERVATIVE FREE 10 ML: 5 INJECTION INTRAVENOUS at 08:29

## 2025-05-08 RX ADMIN — ASPIRIN 81 MG: 81 TABLET, CHEWABLE ORAL at 08:29

## 2025-05-08 RX ADMIN — ENOXAPARIN SODIUM 40 MG: 100 INJECTION SUBCUTANEOUS at 21:28

## 2025-05-08 RX ADMIN — LORAZEPAM 0.5 MG: 0.5 TABLET ORAL at 17:30

## 2025-05-08 RX ADMIN — SODIUM CHLORIDE, PRESERVATIVE FREE 10 ML: 5 INJECTION INTRAVENOUS at 21:25

## 2025-05-08 NOTE — PROCEDURES
ROUTINE ELECTROENCEPHALOGRAM    Identifying Information:  Name: Elvia Kay  MRN: [id number]  : 1950  Interpreting Physician: Carolyn Bateman DO  Referring Provider: Pham Mott APRN - CNP  Date of EE25  Procedure Location: Inpatient     Clinical History:  Elvia Kay is a 75 y.o. female with concerns for seizure like activity.     Current Medications:    sodium chloride flush  5-40 mL IntraVENous 2 times per day    enoxaparin  40 mg SubCUTAneous QHS    atorvastatin  80 mg Oral Nightly    aspirin  81 mg Oral Daily    Or    aspirin  300 mg Rectal Daily        Indication:  Rule out seizure/seizure disorder     Technical Summary:  28 channels of EEG were recorded in a digital format on a patient who was reported to be awake during the recording. The patient was not sleep deprived prior to the EEG.     The PDR consisted of well-developed, well-regulated 8-9 Hz alpha activity, maximal over the posterior head regions and reactive to eye opening and closure.     Photic stimulation was performed and did not produce any abnormalities. During the recording stage II sleep was not seen.     The EKG lead revealed no rhythm abnormalities.       EEG Interpretation:   This EEG was within normal limits for a patient of this age in the awake state. No focal, lateralizing, or epileptiform features were seen during the recording.       Clinical correlation is recommended.    Carolyn Bateman DO  Epileptologist  2025 5:32 PM

## 2025-05-08 NOTE — PLAN OF CARE
Problem: Discharge Planning  Goal: Discharge to home or other facility with appropriate resources  5/7/2025 2323 by Warren Blevins, RN  Outcome: Progressing  5/7/2025 1641 by Ana Belle RN  Outcome: Progressing     Problem: Safety - Adult  Goal: Free from fall injury  5/7/2025 2323 by Warren Blevins, RN  Outcome: Progressing  5/7/2025 1641 by Ana Belle, RN  Outcome: Progressing

## 2025-05-08 NOTE — CONSULTS
Neurology Service Consult Note  [facility]   Patient Name: Elvia Kay  : 1950        Subjective:   Reason for consult: Dizziness  75 y.o.  female with history of depression, HLD, HTN, thyroid disease, vertigo presenting to [facility] with reports of slurred speech that started on Saturday.  Patient is known to our service and has been evaluated for dizziness in the past.  She was last seen in the hospital 10/26/2024.  She is also had follow-up in our office with last visit 2024.  She did complete vestibular therapy and has had benefit from scopolamine patch.    Patient tells me today that she did not have any dizziness.  She states that she was out to eat for breakfast on Saturday and had a period of acute confusion, left facial weakness and slurred speech.  Her significant other reported that the symptoms persisted for about 2 hours.  Patient refused to go to the emergency department and followed up with her PCP in the office.  Per PCP note patient was reporting dizziness at her visit and difficulty with ambulation as well.  Imaging was ordered during that visit but patient states that she wanted to have follow-up.  Patient shares that she has had similar events in the past with confusion, facial weakness that are not associated with dizziness.  Today she is feeling much better and back to her baseline.      Past Medical History:   Diagnosis Date    Depression     Hyperlipidemia     Hypertension     Thyroid disease     :   Past Surgical History:   Procedure Laterality Date    CHOLECYSTECTOMY       Medications:  Scheduled Meds:   LORazepam  0.5 mg Oral Once    sodium chloride flush  5-40 mL IntraVENous 2 times per day    enoxaparin  40 mg SubCUTAneous QHS    atorvastatin  80 mg Oral Nightly    aspirin  81 mg Oral Daily    Or    aspirin  300 mg Rectal Daily     Continuous Infusions:   sodium chloride       PRN Meds:.sodium chloride flush, sodium chloride,  very similar in nature do have concern for seizure  Continue home dose aspirin, atorvastatin for secondary stroke prevention  LDL 65 which is at goal of 70 or less for stroke prevention  UDS positive for benzodiazepines, patient denies taking this medication, not on her home med list.?  Potentially received in ED  PT/OT/ST as recommended  Further recommendations pending completion of neurodiagnostics    Follow up: With our office at discharge    Thank you for allowing us to participate in the care of your patient.  If there are any questions regarding evaluation please feel free to contact us.     Pham Mott, GIDEON - CNP, 5/8/2025     Patient with h/o dizziness and acute episode of confusion and slurred speech. Currently anxious about going for MRI. No focal weakness.     Attending Note:  I have rounded on this patient with Pham HOGUE. I have reviewed the chart and we have discussed this case in detail. The patient was seen and examined by myself. Pertinent labs and imaging have been personally reviewed.   Our findings and impressions were discussed with the patient. I concur with the Nurse Practioner's assessment and plan.    Karen Cho, DO

## 2025-05-08 NOTE — PROGRESS NOTES
Routine inpatient EEG completed.  Epileptologist, jigar Bateman, notified via [place].       Electronically signed by Chana Carrion MA on 5/8/2025 at 12:03 PM

## 2025-05-08 NOTE — PROGRESS NOTES
Facility/Department: [address]   CLINICAL BEDSIDE SWALLOW EVALUATION    NAME: Elvia Kay  : 1950  MRN: [id number]    ADMISSION DATE: 2025  ADMITTING DIAGNOSIS: has Vertigo; Primary osteoarthritis involving multiple joints; Depression; Hypothyroidism; Essential hypertension; History of CVA in adulthood; Dizziness; and TIA (transient ischemic attack) on their problem list.    Impressions: Elvia Kay was seen for a bedside swallowing evaluation after being admitted to [facility] with TIA and slurred speech.  Pt was alert and cooperative throughout assessment.  She reports speech has improved since admission.  Relevant medical hx includes depression, hypothyroidism, CVA and hypertension.  Pt denies hx of dysphagia PTA.   Pt was positioned upright in bed and presented with a clear vocal quality and strong volitional cough.  Oral mechanism examination was WFL with no focal orofacial weakness.  PO trials of regular solids and thin liquids by cup/straw sips were given.  Oropharyngeal swallow appears grossly intact characterized by adequate mastication and oral clearance, timely swallow initiation and adequate laryngeal elevation.  Clear vocal quality and 0 overt s/s of aspiration were observed with all PO trials given.      Recommend continue regular solids/thin liquids with strict aspiration precautions.  No further acute SLP needs were identified at this time.        ONSET DATE: this admission     Date of Eval: 2025  Evaluating Therapist: SOFI Oviedo    Current Diet level:  Current Diet : Regular  Current Liquid Diet : Thin    Primary Complaint  weakness    Pain:  Pain Assessment  Pain Assessment: None - Denies Pain    Reason for Referral  Elvia Kay was referred for a bedside swallow evaluation to assess the efficiency of her swallow function, identify signs and symptoms of aspiration and make recommendations regarding safe dietary consistencies, effective compensatory strategies,

## 2025-05-08 NOTE — PROGRESS NOTES
V2.0  [facility] Hospitalist Progress Note      Name:  Elvia Kay /Age/Sex: 1950  (75 y.o. female)   MRN & CSN:  [id number] & [id number] Encounter Date/Time: 2025 8:49 AM EDT    Location:  [address] [place] [address] PCP: Wayne Ho DO       Hospital Day: 2    Assessment and Plan:   Elvia Kay is a 75 y.o. female with pmh as noted below who presents with Dizziness      Plan:  TIA with slurred speech.  Last known well was Saturday noon.  NIH is 0 at the moment.  CT head and CTA negative MRI of the brain has been ordered consider neurology consultation telemetry in place NIHSS every 4 hours for 6 occurrences.  Lipid panel, A1C, TSH unremarkable     Benign essential hypertension on amlodipine allow permissive hypertension goal of around 185/110 for the past 24 hours and then will go for goal of less than 140/90    Hyperlipidemia on ezetimibe and Lipitor at home currently on Lipitor    Reactive leukocytosis no active signs of infection UA is positive for leukocyte esterase but WBC, No symptoms are not present - hold off on antibiotics likely asymptomatic bacteriuria    Diet ADULT DIET; Regular   DVT Prophylaxis [] Lovenox, []  Heparin, [] SCDs, [] Ambulation,  [] Eliquis, [] Xarelto  [] Coumadin   Code Status Full Code   Disposition From: Home  Expected Disposition: Home  Estimated Date of Discharge:   Patient requires continued admission due to Neuro work up   Surrogate Decision Maker/ LACEY Nix     Subjective:     Chief Complaint: Dizziness     Elvia Kay is a 75 y.o. female who presents with Dizziness  Patient denies, numbness/ tingling, vision/speech changes, dizziness, or unilateral weakness. EEG complete. Awaiting MRI           Review of Systems:    Pertinent neg/pos noted above        Objective:     Intake/Output Summary (Last 24 hours) at 2025 0849  Last data filed at 2025 0829  Gross per 24 hour   Intake 485 ml   Output --   Net 485 ml        Vitals:   Vitals:    25 0815

## 2025-05-08 NOTE — CONSULTS
[facility] ACUTE CARE PHYSICAL THERAPY EVALUATION  Elvia Kay, 1950, 4122/4122-A, 5/8/2025    History  [place]:  There were no encounter diagnoses.  Patient  has a past medical history of Depression, Hyperlipidemia, Hypertension, and Thyroid disease.  Patient  has a past surgical history that includes Cholecystectomy.    Recommendation: Home with HH PT and PRN assist    Subjective:  Patient states: \"He's at home cutting my grass right now!\".    Pain:  denies.    Communication with other providers:  RN approval for therapy session, co-eval with KIM Madrid  Restrictions: general precautions    Home Setup/Prior level of function  Social/Functional History  Lives With: Alone (Boyfriend over frequently)  Type of Home: House  Home Layout: One level  Home Access: Stairs to enter with rails  Entrance Stairs - Number of Steps: 3  Bathroom Shower/Tub: Tub/Shower unit  Bathroom Toilet: Handicap height  Bathroom Equipment: Grab bars in shower  Home Equipment: Cane - Quad, Rollator (uses cane prn)  Has the patient had two or more falls in the past year or any fall with injury in the past year?: No  Prior Level of Assist for ADLs: Independent  Prior Level of Assist for Homemaking: Independent  Homemaking Responsibilities: Yes  Prior Level of Assist for Ambulation: Independent household ambulator, with or without device  Prior Level of Assist for Transfers: Independent  Active : Yes    Examination of body systems (includes body structures/functions, activity/participation limitations):  Observation:  Supine in bed upon arrival, pleasant and agreeable to therapy  Vision:  glasses  Hearing:  WFL  Cardiopulmonary:  on room air  Cognition: WFL, see OT/SLP note for further evaluation.    Musculoskeletal  ROM R/L:  WFL BLEs.    Strength R/L:  grossly 4 + to 5/5 BLEs, minimal to no impairment in function and endurance.    Neuro:  reports intermittent numbness/tingling in BLEs      Mobility:  Rolling L/R:

## 2025-05-08 NOTE — PROGRESS NOTES
is A&Ox4, attention appears intact, and is able to follow multi-step commands w/o difficulty. Pt requires increased time for processing.    Affect: Pleasant        Activities of Daily Living (ADLs):  Feeding: Independent   Grooming: Supervision (pt stood at sink to perform oral care)   UB bathing: Supervision   LB bathing: Supervision   UB dressing: Independent   LB dressing: Supervision (pt demo'ed ability to reach to moraima feet seated EOB by drawling leg up)   Toileting: Independent     Pt ADL function inferred from observation of gross motor function, and assessment of mobility, balance, posture, safety awareness, cognition, and activity tolerance.     AM-PAC 6 click short form for inpatient daily activity:   How much help from another person does the patient currently need... Unable  Dep A Lot  Max A A Lot   Mod A A Little  Min A A Little   CGA  SBA None   Mod I  Indep  Sup   1.  Putting on and taking off regular lower body clothing? [] 1    [] 2   [] 2   [] 3   [] 3   [x] 4      2. Bathing (including washing, rinsing, drying)? [] 1   [] 2   [] 2 [] 3 [] 3 [x] 4   3. Toileting, which includes using toilet, bedpan, or urinal? [] 1    [] 2   [] 2   [] 3   [] 3   [x] 4     4. Putting on and taking off regular upper body clothing? [] 1   [] 2   [] 2   [] 3   [] 3    [x] 4      5. Taking care of personal grooming such as brushing teeth? [] 1   [] 2    [] 2 [] 3    [] 3   [x] 4      6. Eating meals?   [] 1   [] 2   [] 2   [] 3   [] 3   [x] 4      Raw Score:  24    [24=0% impaired(CH), 23=1-19%(CI), 20-22=20-39%(CJ), 15-19=40-59%(CK), 10-14=60-79%(CL), 7-9=80-99%(CM), 6=100%(CN)]     Mobility:  Supine to sit: SBA   Sit to stand: CGA (from EOB)   Stand to sit: SBA (to EOB)  Sit to supine: SBA   Functional Mobility: CGA to SBA (approx 200ft in zarate w / no AD)     Balance:   Sitting balance: Good   Standing balance: Fair+     Pt educated on role of therapy, POC, safety awareness, importance of OOB activity    Safety:  Patient left semi fowlers in bed with alarm, call light within reach, RN notified, gait belt used.    Assessment:  Pt is a 76y/o female admitted from home for dizziness. Pt at baseline is independent for ADLs and for functional transfers/mobility w/ cane prn. Pt currently presents w/ deficits relating to functional activity tolerance, cognition, safety awareness, and functional mobility. Pt would not benefit from continued acute care OT services and may return home once medically appropriate, w/ home support from family/friends.     Complexity: Moderate   Prognosis: Good, no significant barriers to participation at this time.   Occupational Therapy Plan  Times Per Week: jose anthony     Recommendations for NURSING activity: Up to chair for all 3 meals and up to bathroom for all toileting needs    Time:   Time in: 0925  Time out: 0936  Timed treatment minutes: 0  Total time: 11 minutes     Electronically signed by:    JACQUIE Chahal/SONI [id number]  5/8/2025, 9:48 AM

## 2025-05-09 VITALS
RESPIRATION RATE: 18 BRPM | HEIGHT: 64 IN | SYSTOLIC BLOOD PRESSURE: 139 MMHG | BODY MASS INDEX: 24.55 KG/M2 | WEIGHT: 143.8 LBS | DIASTOLIC BLOOD PRESSURE: 75 MMHG | HEART RATE: 82 BPM | OXYGEN SATURATION: 96 % | TEMPERATURE: 98.1 F

## 2025-05-09 PROBLEM — R53.1 LEFT-SIDED WEAKNESS: Status: ACTIVE | Noted: 2025-05-07

## 2025-05-09 LAB
ANION GAP SERPL CALCULATED.3IONS-SCNC: 11 MMOL/L (ref 9–17)
BUN SERPL-MCNC: 20 MG/DL (ref 7–20)
CALCIUM SERPL-MCNC: 8.7 MG/DL (ref 8.3–10.6)
CHLORIDE SERPL-SCNC: 109 MMOL/L (ref 99–110)
CO2 SERPL-SCNC: 22 MMOL/L (ref 21–32)
CREAT SERPL-MCNC: 0.6 MG/DL (ref 0.6–1.2)
ERYTHROCYTE [DISTWIDTH] IN BLOOD BY AUTOMATED COUNT: 12.6 % (ref 11.7–14.9)
GFR, ESTIMATED: 89 ML/MIN/1.73M2
GLUCOSE SERPL-MCNC: 103 MG/DL (ref 74–99)
HCT VFR BLD AUTO: 39.1 % (ref 37–47)
HGB BLD-MCNC: 12.9 G/DL (ref 12.5–16)
MAGNESIUM SERPL-MCNC: 2 MG/DL (ref 1.8–2.4)
MCH RBC QN AUTO: 31.6 PG (ref 27–31)
MCHC RBC AUTO-ENTMCNC: 33 G/DL (ref 32–36)
MCV RBC AUTO: 95.8 FL (ref 78–100)
PHOSPHATE SERPL-MCNC: 3 MG/DL (ref 2.5–4.9)
PLATELET # BLD AUTO: 296 K/UL (ref 140–440)
PMV BLD AUTO: 10.2 FL (ref 7.5–11.1)
POTASSIUM SERPL-SCNC: 3.5 MMOL/L (ref 3.5–5.1)
RBC # BLD AUTO: 4.08 M/UL (ref 4.2–5.4)
SODIUM SERPL-SCNC: 141 MMOL/L (ref 136–145)
WBC OTHER # BLD: 7.3 K/UL (ref 4–10.5)

## 2025-05-09 PROCEDURE — 99232 SBSQ HOSP IP/OBS MODERATE 35: CPT | Performed by: NURSE PRACTITIONER

## 2025-05-09 PROCEDURE — 36415 COLL VENOUS BLD VENIPUNCTURE: CPT

## 2025-05-09 PROCEDURE — 2500000003 HC RX 250 WO HCPCS: Performed by: STUDENT IN AN ORGANIZED HEALTH CARE EDUCATION/TRAINING PROGRAM

## 2025-05-09 PROCEDURE — 6370000000 HC RX 637 (ALT 250 FOR IP): Performed by: STUDENT IN AN ORGANIZED HEALTH CARE EDUCATION/TRAINING PROGRAM

## 2025-05-09 PROCEDURE — G0378 HOSPITAL OBSERVATION PER HR: HCPCS

## 2025-05-09 PROCEDURE — 80048 BASIC METABOLIC PNL TOTAL CA: CPT

## 2025-05-09 PROCEDURE — 85027 COMPLETE CBC AUTOMATED: CPT

## 2025-05-09 PROCEDURE — 94761 N-INVAS EAR/PLS OXIMETRY MLT: CPT

## 2025-05-09 PROCEDURE — 83735 ASSAY OF MAGNESIUM: CPT

## 2025-05-09 PROCEDURE — 84100 ASSAY OF PHOSPHORUS: CPT

## 2025-05-09 RX ADMIN — ASPIRIN 81 MG: 81 TABLET, CHEWABLE ORAL at 09:25

## 2025-05-09 RX ADMIN — SODIUM CHLORIDE, PRESERVATIVE FREE 10 ML: 5 INJECTION INTRAVENOUS at 09:26

## 2025-05-09 NOTE — PROGRESS NOTES
Neurology Service Progress Note  [facility]   Patient Name: Elvia Kay  : 1950        Subjective:   Reason for consult: Dizziness  Chart was reviewed in detail, patient was seen and examined.  She is awake and alert this morning, she feels that she is back to her baseline.  We did again discuss the events that occurred.  Patient states that she did not have dizziness but rather speech changes, confusion and left-sided weakness.  States that these are how her episodes usually are.  She reports that she has had these episodes about once a year.  She does feel like they could be triggered by periods of stress and notes that one of them occurred while she was at her granddaughter's graduation.  She does note that her daughter was hospitalized on Wednesday for procedure and goes on to explain that her daughter was rather frustrated with the patient as she was not there to support her daughter during this procedure.  Patient tells me \"my kids are all against me\" but shares that this is an ongoing thing.  We discussed the concern for seizure but also discussed nonepileptic events.  Do not feel that this was secondary to TIA.  Will have the patient follow-up in our office at discharge for further seizure workup, additionally patient is medically optimized with aspirin, statin for secondary stroke prevention at baseline.      Past Medical History:   Diagnosis Date    Depression     Hyperlipidemia     Hypertension     Thyroid disease     :   Past Surgical History:   Procedure Laterality Date    CHOLECYSTECTOMY       Medications:  Scheduled Meds:      Continuous Infusions:      PRN Meds:.    No Known Allergies  Social History     Socioeconomic History    Marital status: Single     Spouse name: Not on file    Number of children: Not on file    Years of education: Not on file    Highest education level: Not on file   Occupational History    Not on file   Tobacco Use    Smoking status: Never     rashes  Neuro: Confusion, slurred speech, dizziness?  Endocrine: Denies unexpected weight loss  Heme: Denies bleeding disorders    Physical Exam:       Wt Readings from Last 3 Encounters:   05/09/25 65.2 kg (143 lb 12.8 oz)   05/05/25 65.3 kg (144 lb)   04/14/25 65.9 kg (145 lb 3.2 oz)     Temp Readings from Last 3 Encounters:   05/09/25 98.1 °F (36.7 °C) (Oral)   10/28/24 97.6 °F (36.4 °C) (Oral)   10/07/24 97.8 °F (36.6 °C)     BP Readings from Last 3 Encounters:   05/09/25 139/75   05/05/25 110/76   04/14/25 125/71     Pulse Readings from Last 3 Encounters:   05/09/25 82   05/05/25 82   04/14/25 72        Gen: A&O x 4, NAD, cooperative  HEENT: NC/AT, EOMI, PERRL, mmm,  neck supple, no meningeal signs  Heart: NSR  Lungs: Respirations even and unlabored  Ext: no edema, no calf tenderness b/l  Psych: normal mood and affect  Skin: no rashes or lesions    NEUROLOGIC EXAM:    Mental Status: A&O to self, location, month and year, NAD, speech clear, language fluent, repetition and naming intact, follows commands appropriately    Cranial Nerve Exam:   CN II-XII: No disc edema appreciated on fundoscopic examination, PERRL, VFF, no nystagmus, no gaze paresis, sensation V1-V3 intact b/l, muscles of facial expression symmetric; hearing intact to conversational tone, palate elevates symmetrically, shoulder elevation symmetric and tongue protrudes midline with movement side to side.    Motor Exam:       Strength 5/5 UE's/LE's b/l  Tone and bulk normal   No pronator drift    Deep Tendon Reflexes: 2/4 biceps, triceps, brachioradialis, patellar, and achilles b/l; flexor plantar responses b/l    Sensation: Intact light touch/pinprick/vibration UE's/LE's b/l    Coordination/Cerebellum:       Tremors--none      Rapidly alternating movements: no dysdiadochokinesia b/l                Heel-to-Shin: no dysmetria b/l      Finger-to-Nose: no dysmetria b/l    Gait and stance:      Gait: deferred      LABS:     Recent Labs     05/07/25  1126

## 2025-05-09 NOTE — DISCHARGE SUMMARY
V2.0  Discharge Summary    Name:  Elvia Kay /Age/Sex: 1950 (75 y.o. female)   Admit Date: 2025  Discharge Date: 25    MRN & CSN:  [id number] & [id number] Encounter Date and Time 25 9:15 AM EDT    Attending:  Dominguez Lew MD Discharging Provider: Francy Obando APRN - CNP       Hospital Course:     Brief HPI: Elvia Kay is a 75 y.o. female who presented with ***    Brief Problem Based Course:   ***      The patient expressed appropriate understanding of, and agreement with the discharge recommendations, medications, and plan.     Consults this admission:  IP CONSULT TO NEUROLOGY  IP CONSULT TO CASE MANAGEMENT    Discharge Diagnosis:   Left-sided weakness    ***    Discharge Instruction:   Follow up appointments: ***  Primary care physician: Wayne Ho DO within 2 weeks  Diet: {diet:40942}   Activity: {discharge activity:20839}  Disposition: Discharged to:   []Home, [][facility], []SNF, []Acute Rehab, []Other Acute Care Facility, []Hospice   Condition on discharge: Stable  Labs and Tests to be Followed up as an outpatient by PCP or Specialist: ***    Discharge Medications:        Medication List        CONTINUE taking these medications      amLODIPine 5 MG tablet  Commonly known as: NORVASC     atorvastatin 40 MG tablet  Commonly known as: LIPITOR     EQ Aspirin Adult Low Dose 81 MG EC tablet  Generic drug: aspirin  Take 1 tablet by mouth daily     ezetimibe 10 MG tablet  Commonly known as: ZETIA  Take 1 tablet by mouth daily             Objective Findings at Discharge:   /75   Pulse 82   Temp 98.1 °F (36.7 °C) (Oral)   Resp 18   Ht 1.626 m (5' 4\")   Wt 65.2 kg (143 lb 12.8 oz)   SpO2 96%   BMI 24.68 kg/m²       Physical Exam:   General: NAD  Eyes: EOMI  ENT: neck supple  Cardiovascular: Regular rate.  Respiratory: Clear to auscultation  Gastrointestinal: Soft, non tender  Genitourinary: no suprapubic tenderness  Musculoskeletal: No edema  Skin: warm,

## 2025-05-09 NOTE — CARE COORDINATION
Chart reviewed and met w/ patient to initiate discharge planning. CM introduced self and explained role. Patient is from home alone, independent PTA, has PCP and insurance. Patient reports she has a walker and cane at home currently. Patient declined any active HHC. CM discussed therapy recommendations of continued therapy at discharge. Patient in agreement and stated no preference in home care companies as she has had HHC previously but does not remember the companies name.     10:13 AM Referral sent to [facility].    12:05 PM Received update that patient declined home health services at discharge.     05/09/25 0857   Service Assessment   Patient Orientation Alert and Oriented   Cognition Alert   History Provided By Patient   Primary Caregiver Self   Support Systems Spouse/Significant Other   Patient's Healthcare Decision Maker is: Legal Next of Kin   PCP Verified by CM Yes   Prior Functional Level Independent in ADLs/IADLs   Can patient return to prior living arrangement Yes   Ability to make needs known: Good   Family able to assist with home care needs: Yes   Financial Resources Medicare   Community Resources None   Social/Functional History   Lives With Alone   Type of Home House   Home Layout One level   Home Equipment Cane - Quad;Rollator   Prior Level of Assist for ADLs Independent   Prior Level of Assist for Transfers Independent   Active  Yes   Discharge Planning   Type of Residence House   Living Arrangements Alone   Current Services Prior To Admission None   DME Ordered? No   Potential Assistance Purchasing Medications No   Patient expects to be discharged to: House   Services At/After Discharge   Services At/After Discharge Home Health   Condition of Participation: Discharge Planning   The Patient and/or Patient Representative was provided with a Choice of Provider? Patient   The Patient and/Or Patient Representative agree with the Discharge Plan? Yes   Freedom of Choice list was provided

## 2025-05-12 ENCOUNTER — TELEPHONE (OUTPATIENT)
Dept: FAMILY MEDICINE CLINIC | Age: 75
End: 2025-05-12

## 2025-05-12 RX ORDER — LEVOTHYROXINE SODIUM 88 UG/1
88 TABLET ORAL DAILY
Qty: 30 TABLET | Refills: 5 | Status: SHIPPED | OUTPATIENT
Start: 2025-05-12

## 2025-05-12 NOTE — TELEPHONE ENCOUNTER
Care Transitions Initial Follow Up Call    Outreach made within 2 business days of discharge: Yes    Patient: Elvia Kay Patient : 1950   MRN: [id number]  Reason for Admission: 2025  Discharge Date: 25       Spoke with: Patient     Discharge department/facility: [facility]    TCM Interactive Patient Contact:  Was patient able to fill all prescriptions: Yes  Was patient instructed to bring all medications to the follow-up visit: Yes  Is patient taking all medications as directed in the discharge summary? Yes  Does patient understand their discharge instructions: Yes  Does patient have questions or concerns that need addressed prior to 7-14 day follow up office visit: no    Additional needs identified to be addressed with provider  No needs identified             Scheduled appointment with PCP within 7-14 days    Follow Up  Future Appointments   Date Time Provider Department Center   2025  2:15 PM Josse Benton, GIDEON - CNP [facility] Neurology -   2025  1:30 PM Patrick Lees PA-C [facility]   2025  1:00 PM Wayne Ho DO [facility] DEP       Machelle Alejandro MA     Orthopedic Spine appears normal, range of motion is not limited, no muscle or joint tenderness

## 2025-05-12 NOTE — TELEPHONE ENCOUNTER
[place]  5/5/25    [place]  7/31/25      [facility]      Question:    Patient went and got an MRI and CT Scan----but she was told by the hospital, she has to come back and get the same imaging ----she does not know why she has to do this and is concerned her insurance will not pay for these extra imagings.

## 2025-05-28 ENCOUNTER — OFFICE VISIT (OUTPATIENT)
Age: 75
End: 2025-05-28
Payer: MEDICARE

## 2025-05-28 VITALS
OXYGEN SATURATION: 99 % | SYSTOLIC BLOOD PRESSURE: 134 MMHG | HEART RATE: 91 BPM | DIASTOLIC BLOOD PRESSURE: 74 MMHG | WEIGHT: 146.6 LBS | BODY MASS INDEX: 25.16 KG/M2

## 2025-05-28 DIAGNOSIS — M54.2 NECK PAIN: ICD-10-CM

## 2025-05-28 DIAGNOSIS — R26.9 IMPAIRED GAIT: ICD-10-CM

## 2025-05-28 DIAGNOSIS — R56.9 SEIZURE-LIKE ACTIVITY (HCC): Primary | ICD-10-CM

## 2025-05-28 DIAGNOSIS — R25.1 TREMOR: ICD-10-CM

## 2025-05-28 PROCEDURE — 99214 OFFICE O/P EST MOD 30 MIN: CPT | Performed by: NURSE PRACTITIONER

## 2025-05-28 PROCEDURE — 3075F SYST BP GE 130 - 139MM HG: CPT | Performed by: NURSE PRACTITIONER

## 2025-05-28 PROCEDURE — G8427 DOCREV CUR MEDS BY ELIG CLIN: HCPCS | Performed by: NURSE PRACTITIONER

## 2025-05-28 PROCEDURE — 1090F PRES/ABSN URINE INCON ASSESS: CPT | Performed by: NURSE PRACTITIONER

## 2025-05-28 PROCEDURE — 1123F ACP DISCUSS/DSCN MKR DOCD: CPT | Performed by: NURSE PRACTITIONER

## 2025-05-28 PROCEDURE — 1036F TOBACCO NON-USER: CPT | Performed by: NURSE PRACTITIONER

## 2025-05-28 PROCEDURE — 1159F MED LIST DOCD IN RCRD: CPT | Performed by: NURSE PRACTITIONER

## 2025-05-28 PROCEDURE — G8399 PT W/DXA RESULTS DOCUMENT: HCPCS | Performed by: NURSE PRACTITIONER

## 2025-05-28 PROCEDURE — 3078F DIAST BP <80 MM HG: CPT | Performed by: NURSE PRACTITIONER

## 2025-05-28 PROCEDURE — G8419 CALC BMI OUT NRM PARAM NOF/U: HCPCS | Performed by: NURSE PRACTITIONER

## 2025-05-28 NOTE — PROGRESS NOTES
5/28/25    Elvia Kay  1950    Chief Complaint   Patient presents with    Follow-Up from Hospital     Patient here for hospital follow up on Episode of acute confusion, slurred speech, facial weakness, per patient a week ago had similar symptoms        History of Present Illness  Elvia is a 75 y.o. female presenting today for hospital follow-up evaluated by Dr. Cho for episode of acute confusion, slurred speech, facial weakness. MRI brain pending to evaluate for acute stroke. Will also obtain routine EEG to evaluate further for seizure given recurrent events of similar nature. This would not be consistent with TIA(symptoms lasted too long).  CT head nonacute, CTA head and neck nonacute.  MRI brain nonacute.  Routine EEG was normal.  Needs to follow-up outpatient for ambulatory EEG to evaluate for further seizure.    Today, she is accompanied by her boyfriend.  She walks with a cane.  Is off balance.  Has had another episode while her boyfriend was driving back from [place] last week.  She felt lightheaded, her boyfriend states her face looked droopy and her face was pale.  Her words get slurred and she gets kind of confused after.    She mentioned that she had neck pain today in the office and also has a peculiar gait, is off balance.  Looks more ataxic than spastic however she had a normal bilateral lower extremity EMG in 2022.  She also has bilateral tremors in her hands and head.    In regards to her memory, she does have delayed responses.  She is with her boyfriend today of 9 years.  He has no concerns for her safety at home and he tells me that he has no concerns for her memory, his exact words were \" she just has age-related memory issues like me.\"    Current Outpatient Medications   Medication Sig Dispense Refill    ezetimibe (ZETIA) 10 MG tablet Take 1 tablet by mouth daily 30 tablet 3    EQ ASPIRIN ADULT LOW DOSE 81 MG EC tablet Take 1 tablet by mouth daily 30 tablet 3    amLODIPine

## 2025-05-29 ENCOUNTER — TELEPHONE (OUTPATIENT)
Age: 75
End: 2025-05-29

## 2025-05-29 NOTE — TELEPHONE ENCOUNTER
Attempted to call patient due to patient leaving VM in regards to her attempting to schedule testing ordered but was told we are supposed to schedule. Upon review, she had MRI ordered which central scheduling should be able to schedule. 48 hr EEG is the only one that was also ordered that we will be sending to [facility] once note is signed. No answer, unable to leave  due to  not being set up.

## 2025-05-30 NOTE — TELEPHONE ENCOUNTER
Received call from pt inquiring about ambulatory EEG. Informed that information was faxed to [facility] today and they should have her information to schedule but unfortunately our office does not have the ability to schedule those tests, she voiced understanding.

## 2025-06-04 ENCOUNTER — TELEPHONE (OUTPATIENT)
Age: 75
End: 2025-06-04

## 2025-06-04 DIAGNOSIS — F40.240 CLAUSTROPHOBIA: Primary | ICD-10-CM

## 2025-06-04 RX ORDER — DIAZEPAM 2 MG/1
TABLET ORAL
Qty: 2 TABLET | Refills: 0 | Status: SHIPPED | OUTPATIENT
Start: 2025-06-04 | End: 2025-06-16

## 2025-06-04 NOTE — TELEPHONE ENCOUNTER
Pt is requesting medication to help her relax during MRI sched 6/13/25. Please advise.     [facility] [address] please.

## 2025-06-08 DIAGNOSIS — E78.2 MODERATE MIXED HYPERLIPIDEMIA NOT REQUIRING STATIN THERAPY: ICD-10-CM

## 2025-06-09 RX ORDER — EZETIMIBE 10 MG/1
10 TABLET ORAL DAILY
Qty: 90 TABLET | Refills: 1 | Status: SHIPPED | OUTPATIENT
Start: 2025-06-09

## 2025-06-13 ENCOUNTER — HOSPITAL ENCOUNTER (OUTPATIENT)
Dept: MRI IMAGING | Age: 75
Discharge: HOME | End: 2025-06-13
Payer: MEDICARE

## 2025-06-13 DIAGNOSIS — R26.9 IMPAIRED GAIT: ICD-10-CM

## 2025-06-13 DIAGNOSIS — R25.1 TREMOR: ICD-10-CM

## 2025-06-13 DIAGNOSIS — M54.2 NECK PAIN: ICD-10-CM

## 2025-06-13 PROCEDURE — 72141 MRI NECK SPINE W/O DYE: CPT

## 2025-06-16 DIAGNOSIS — Z86.73 HISTORY OF CVA IN ADULTHOOD: ICD-10-CM

## 2025-06-17 RX ORDER — ASPIRIN 81 MG/1
81 TABLET, COATED ORAL DAILY
Qty: 30 TABLET | Refills: 1 | Status: SHIPPED | OUTPATIENT
Start: 2025-06-17

## 2025-06-18 DIAGNOSIS — G40.89 OTHER SEIZURES (HCC): Primary | ICD-10-CM

## 2025-06-19 ENCOUNTER — RESULTS FOLLOW-UP (OUTPATIENT)
Age: 75
End: 2025-06-19

## 2025-06-23 ENCOUNTER — OFFICE VISIT (OUTPATIENT)
Dept: FAMILY MEDICINE CLINIC | Age: 75
End: 2025-06-23
Payer: MEDICARE

## 2025-06-23 VITALS
BODY MASS INDEX: 24.92 KG/M2 | HEART RATE: 92 BPM | HEIGHT: 64 IN | SYSTOLIC BLOOD PRESSURE: 122 MMHG | WEIGHT: 146 LBS | OXYGEN SATURATION: 97 % | DIASTOLIC BLOOD PRESSURE: 68 MMHG

## 2025-06-23 DIAGNOSIS — G89.29 CHRONIC PAIN OF BOTH KNEES: Primary | ICD-10-CM

## 2025-06-23 DIAGNOSIS — M25.561 CHRONIC PAIN OF BOTH KNEES: Primary | ICD-10-CM

## 2025-06-23 DIAGNOSIS — M25.562 CHRONIC PAIN OF BOTH KNEES: Primary | ICD-10-CM

## 2025-06-23 PROCEDURE — 1159F MED LIST DOCD IN RCRD: CPT | Performed by: PHYSICIAN ASSISTANT

## 2025-06-23 PROCEDURE — 3074F SYST BP LT 130 MM HG: CPT | Performed by: PHYSICIAN ASSISTANT

## 2025-06-23 PROCEDURE — 3078F DIAST BP <80 MM HG: CPT | Performed by: PHYSICIAN ASSISTANT

## 2025-06-23 PROCEDURE — G8399 PT W/DXA RESULTS DOCUMENT: HCPCS | Performed by: PHYSICIAN ASSISTANT

## 2025-06-23 PROCEDURE — 1090F PRES/ABSN URINE INCON ASSESS: CPT | Performed by: PHYSICIAN ASSISTANT

## 2025-06-23 PROCEDURE — G8419 CALC BMI OUT NRM PARAM NOF/U: HCPCS | Performed by: PHYSICIAN ASSISTANT

## 2025-06-23 PROCEDURE — 1123F ACP DISCUSS/DSCN MKR DOCD: CPT | Performed by: PHYSICIAN ASSISTANT

## 2025-06-23 PROCEDURE — 1036F TOBACCO NON-USER: CPT | Performed by: PHYSICIAN ASSISTANT

## 2025-06-23 PROCEDURE — 1160F RVW MEDS BY RX/DR IN RCRD: CPT | Performed by: PHYSICIAN ASSISTANT

## 2025-06-23 PROCEDURE — 99213 OFFICE O/P EST LOW 20 MIN: CPT | Performed by: PHYSICIAN ASSISTANT

## 2025-06-23 PROCEDURE — G8427 DOCREV CUR MEDS BY ELIG CLIN: HCPCS | Performed by: PHYSICIAN ASSISTANT

## 2025-06-23 RX ORDER — METHYLPREDNISOLONE 4 MG/1
TABLET ORAL
Qty: 1 KIT | Refills: 0 | Status: SHIPPED | OUTPATIENT
Start: 2025-06-23

## 2025-06-23 RX ORDER — TRAMADOL HYDROCHLORIDE 50 MG/1
50 TABLET ORAL
Qty: 20 TABLET | Refills: 0 | Status: SHIPPED | OUTPATIENT
Start: 2025-06-23 | End: 2025-06-28

## 2025-06-23 NOTE — PROGRESS NOTES
6/23/2025    Elvia Kay    Chief Complaint   Patient presents with    Leg Pain     Bilat. Ongoing. Going on a bus trip, doesn't want to not be able to walk.        HPI  History was obtained from patient.  Elvia is a 75 y.o. female who presents today with complaints of chronic pain in both knees.  She is following Ortho for this.  She had a recent cortisone injection into the right knee approximately 8 weeks ago.  She states the injections help initially but they do not last long.  She has an upcoming trip to [place] and will be riding a bus.  She is requesting pain medication to help get her through this trip.  PDMP reviewed and appropriate.  She denies any baseline changes in regards to her pain.  She denies any redness, swelling, warmth.  She denies any weakness from baseline.  Is using a cane for gait stability.    Left knee x-ray April 2025  Narrative & Impression  4 views of the left knee show bone-on-bone articulation of the medial compartment of the left knee.  Significant osteoarthritic changes seen at the patellofemoral joint as well.  Soft tissue calcification seen at the lateral joint line.  Over alignment is varus.  No additional osseous or soft tissue abnormalities.      PAST MEDICAL HISTORY  Past Medical History:   Diagnosis Date    Depression     Hyperlipidemia     Hypertension     Thyroid disease        FAMILY HISTORY  Family History   Problem Relation Age of Onset    Suicide Mother     Heart Disease Father     Lung Cancer Maternal Grandfather        SOCIAL HISTORY  Social History     Socioeconomic History    Marital status: Single   Tobacco Use    Smoking status: Never    Smokeless tobacco: Never   Substance and Sexual Activity    Alcohol use: Yes     Comment: rarely    Drug use: No    Sexual activity: Not Currently     Social Drivers of Health     Financial Resource Strain: Low Risk  (11/1/2024)    Overall Financial Resource Strain (CARDIA)     Difficulty of Paying Living Expenses: Not

## 2025-06-26 ENCOUNTER — TELEPHONE (OUTPATIENT)
Age: 75
End: 2025-06-26

## 2025-06-26 NOTE — TELEPHONE ENCOUNTER
Attempted to call patient to advise per Josse, \"Please let pt know that her aEEG was normal\" No answer, unable to leave VM due to VM not being set up.

## 2025-06-26 NOTE — PROGRESS NOTES
AMBULATORY ELECTROENCEPHALOGRAM REPORT     Identifying Information:  Name: Elvia Kay  MRN: [id number]  : 1950  Interpreting Physician: Carolyn Bateman DO  Referring Provider: Josse Duque APRN.CNP      Clinical History:  Elvia Kay is an 75 y.o. female with concerns for seizure like activity.      Past Medical History:  Past Medical History:   Diagnosis Date    Depression     Hyperlipidemia     Hypertension     Thyroid disease         Current Medications:   Current Outpatient Medications   Medication Instructions    amLODIPine (NORVASC) 5 mg, DAILY    Aspirin Low Dose 81 mg, Oral, DAILY    atorvastatin (LIPITOR) 40 MG tablet No dose, route, or frequency recorded.    ezetimibe (ZETIA) 10 mg, Oral, DAILY    levothyroxine (SYNTHROID) 88 mcg, Oral, DAILY    methylPREDNISolone (MEDROL, REJI,) 4 MG tablet Use as directed    traMADol (ULTRAM) 50 mg, Oral, EVERY 6-8 HOURS PRN, Take lowest dose possible to manage pain        aEEG start date and time: 25 at 1557   aEEG end date and time: 25 at 1424      Technical Summary:  20 channels of EEG were recorded in a digital format over a 2-day period.  The patient kept a diary of normal activities and any abnormal events and was instructed to press the event button for any abnormal symptoms. Computerized spike and seizure detection was utilized during the entire period of monitoring.       This is an ambulatory EEG performed with video monitoring.    The background rhythm consisted of well-developed, well-regulated 8-9 Hz alpha activity, maximal over the posterior head regions and reactive to eye opening and closure. Symmetric stage II sleep architecture in the form of sleep spindles and K-complexes was seen.     No epileptiform abnormalities were seen.     PUSH-BUTTON EVENTS:  None reported.     The EKG lead did not reveal any rhythm abnormalities, but did have significant artifact throughout the recording.        EEG Interpretation:

## 2025-07-14 ENCOUNTER — OFFICE VISIT (OUTPATIENT)
Dept: ORTHOPEDIC SURGERY | Age: 75
End: 2025-07-14
Payer: MEDICARE

## 2025-07-14 VITALS
RESPIRATION RATE: 14 BRPM | WEIGHT: 146 LBS | HEART RATE: 86 BPM | HEIGHT: 64 IN | BODY MASS INDEX: 24.92 KG/M2 | OXYGEN SATURATION: 97 %

## 2025-07-14 DIAGNOSIS — M17.11 PRIMARY OSTEOARTHRITIS OF RIGHT KNEE: ICD-10-CM

## 2025-07-14 DIAGNOSIS — M17.12 PRIMARY OSTEOARTHRITIS OF LEFT KNEE: Primary | ICD-10-CM

## 2025-07-14 PROCEDURE — 1036F TOBACCO NON-USER: CPT | Performed by: PHYSICIAN ASSISTANT

## 2025-07-14 PROCEDURE — G8419 CALC BMI OUT NRM PARAM NOF/U: HCPCS | Performed by: PHYSICIAN ASSISTANT

## 2025-07-14 PROCEDURE — 99212 OFFICE O/P EST SF 10 MIN: CPT | Performed by: PHYSICIAN ASSISTANT

## 2025-07-14 PROCEDURE — G8399 PT W/DXA RESULTS DOCUMENT: HCPCS | Performed by: PHYSICIAN ASSISTANT

## 2025-07-14 PROCEDURE — 20610 DRAIN/INJ JOINT/BURSA W/O US: CPT | Performed by: PHYSICIAN ASSISTANT

## 2025-07-14 PROCEDURE — 1123F ACP DISCUSS/DSCN MKR DOCD: CPT | Performed by: PHYSICIAN ASSISTANT

## 2025-07-14 PROCEDURE — G8428 CUR MEDS NOT DOCUMENT: HCPCS | Performed by: PHYSICIAN ASSISTANT

## 2025-07-14 PROCEDURE — 1090F PRES/ABSN URINE INCON ASSESS: CPT | Performed by: PHYSICIAN ASSISTANT

## 2025-07-14 PROCEDURE — 1125F AMNT PAIN NOTED PAIN PRSNT: CPT | Performed by: PHYSICIAN ASSISTANT

## 2025-07-14 RX ORDER — TRIAMCINOLONE ACETONIDE 40 MG/ML
40 INJECTION, SUSPENSION INTRA-ARTICULAR; INTRAMUSCULAR ONCE
Status: COMPLETED | OUTPATIENT
Start: 2025-07-14 | End: 2025-07-14

## 2025-07-14 RX ADMIN — TRIAMCINOLONE ACETONIDE 40 MG: 40 INJECTION, SUSPENSION INTRA-ARTICULAR; INTRAMUSCULAR at 13:16

## 2025-07-14 RX ADMIN — TRIAMCINOLONE ACETONIDE 40 MG: 40 INJECTION, SUSPENSION INTRA-ARTICULAR; INTRAMUSCULAR at 13:20

## 2025-07-14 ASSESSMENT — ENCOUNTER SYMPTOMS
RESPIRATORY NEGATIVE: 1
GASTROINTESTINAL NEGATIVE: 1
EYES NEGATIVE: 1

## 2025-07-14 NOTE — PROGRESS NOTES
survey after visiting one of our offices, please take time to share your experience concerning your physician office visit.  These surveys are confidential and no health information about you is shared.  We are eager to improve for you and we are counting on your feedback to help make that happen.     Left knee injection procedure note    Pre-procedure diagnosis:  Left knee DJD    Post-procedure diagnosis:  same    Procedure: The planned procedure/risks/benefits/alternatives were discussed with the patient.  Risks include, but are not limited to, increased pain, drug reaction, infection, bleeding, lack of improvement, neurovascular injury, and increased blood sugar levels.  The patient understood and all of their questions were answered.    The Left knee was prepped with alcohol then a 25 gauge needle was advanced into the inferior-lateral joint without difficulty.  The joint was then injected with 1 ml 1% lidocaine, 1ml of Kenalog (40 mg/ml), 1ml 0.25% bupivacaine the injection site was cleansed with isopropyl alcohol and a band-aid was placed.      Complications:  None, the patient tolerated the procedure well.    Instructions:  The patient was advised to rest the knee and decrease activity for the next 24 to 48 hours. May use prescription or OTC pain relievers as needed for any post-injection pain as well as ice.    Right knee injection procedure note    Pre-procedure diagnosis:  Right knee DJD    Post-procedure diagnosis:  same    Procedure: The planned procedure/risks/benefits/alternatives were discussed with the patient.  Risks include, but are not limited to, increased pain, drug reaction, infection, bleeding, lack of improvement, neurovascular injury, and increased blood sugar levels.  The patient understood and all of their questions were answered.    The right knee was prepped with alcohol, then a 25 gauge needle was advanced into the inferior-lateral joint without difficulty.  The joint was then injected

## 2025-07-14 NOTE — PATIENT INSTRUCTIONS
Continue weight-bearing as tolerated.  Continue range of motion exercises as instructed.  Ice and elevate as needed.  Tylenol or Motrin for pain.  Injection given into the bilateral knees.  Follow up in 3 months.    We are committed to providing you the best care possible.  If you receive a survey after visiting one of our offices, please take time to share your experience concerning your physician office visit.  These surveys are confidential and no health information about you is shared.  We are eager to improve for you and we are counting on your feedback to help make that happen.

## 2025-07-18 NOTE — TELEPHONE ENCOUNTER
Attempted to call patient to advise per Josse, \"Please let pt know that her aEEG was normal\" No answer after a couple of rings, unable to leave VM due to VM not yet being activated.

## 2025-07-29 NOTE — TELEPHONE ENCOUNTER
Attempted to call patient for 3rd time to advise per Josse, \"Please let pt know that her aEEG was normal\" No answer, unable to leave VM due to VM not being set up.

## 2025-08-13 DIAGNOSIS — Z86.73 HISTORY OF CVA IN ADULTHOOD: ICD-10-CM

## 2025-08-14 RX ORDER — ASPIRIN 81 MG/1
81 TABLET, COATED ORAL DAILY
Qty: 30 TABLET | Refills: 0 | Status: SHIPPED | OUTPATIENT
Start: 2025-08-14

## 2025-08-15 ENCOUNTER — OFFICE VISIT (OUTPATIENT)
Dept: FAMILY MEDICINE CLINIC | Age: 75
End: 2025-08-15
Payer: MEDICARE

## 2025-08-15 VITALS
HEIGHT: 64 IN | BODY MASS INDEX: 23.76 KG/M2 | WEIGHT: 139.2 LBS | OXYGEN SATURATION: 97 % | SYSTOLIC BLOOD PRESSURE: 130 MMHG | DIASTOLIC BLOOD PRESSURE: 76 MMHG | HEART RATE: 87 BPM

## 2025-08-15 DIAGNOSIS — E78.2 MODERATE MIXED HYPERLIPIDEMIA NOT REQUIRING STATIN THERAPY: ICD-10-CM

## 2025-08-15 DIAGNOSIS — Z86.73 HISTORY OF CVA IN ADULTHOOD: ICD-10-CM

## 2025-08-15 DIAGNOSIS — Z86.73 HISTORY OF STROKE: ICD-10-CM

## 2025-08-15 DIAGNOSIS — I10 ESSENTIAL HYPERTENSION: ICD-10-CM

## 2025-08-15 DIAGNOSIS — E06.3 HYPOTHYROIDISM DUE TO HASHIMOTO THYROIDITIS: Primary | ICD-10-CM

## 2025-08-15 PROCEDURE — 1036F TOBACCO NON-USER: CPT | Performed by: STUDENT IN AN ORGANIZED HEALTH CARE EDUCATION/TRAINING PROGRAM

## 2025-08-15 PROCEDURE — 99214 OFFICE O/P EST MOD 30 MIN: CPT | Performed by: STUDENT IN AN ORGANIZED HEALTH CARE EDUCATION/TRAINING PROGRAM

## 2025-08-15 PROCEDURE — 1123F ACP DISCUSS/DSCN MKR DOCD: CPT | Performed by: STUDENT IN AN ORGANIZED HEALTH CARE EDUCATION/TRAINING PROGRAM

## 2025-08-15 PROCEDURE — G8427 DOCREV CUR MEDS BY ELIG CLIN: HCPCS | Performed by: STUDENT IN AN ORGANIZED HEALTH CARE EDUCATION/TRAINING PROGRAM

## 2025-08-15 PROCEDURE — G8420 CALC BMI NORM PARAMETERS: HCPCS | Performed by: STUDENT IN AN ORGANIZED HEALTH CARE EDUCATION/TRAINING PROGRAM

## 2025-08-15 PROCEDURE — 1159F MED LIST DOCD IN RCRD: CPT | Performed by: STUDENT IN AN ORGANIZED HEALTH CARE EDUCATION/TRAINING PROGRAM

## 2025-08-15 PROCEDURE — 3075F SYST BP GE 130 - 139MM HG: CPT | Performed by: STUDENT IN AN ORGANIZED HEALTH CARE EDUCATION/TRAINING PROGRAM

## 2025-08-15 PROCEDURE — G8399 PT W/DXA RESULTS DOCUMENT: HCPCS | Performed by: STUDENT IN AN ORGANIZED HEALTH CARE EDUCATION/TRAINING PROGRAM

## 2025-08-15 PROCEDURE — 1090F PRES/ABSN URINE INCON ASSESS: CPT | Performed by: STUDENT IN AN ORGANIZED HEALTH CARE EDUCATION/TRAINING PROGRAM

## 2025-08-15 PROCEDURE — 3078F DIAST BP <80 MM HG: CPT | Performed by: STUDENT IN AN ORGANIZED HEALTH CARE EDUCATION/TRAINING PROGRAM

## 2025-08-15 RX ORDER — AMLODIPINE BESYLATE 5 MG/1
5 TABLET ORAL DAILY
Qty: 90 TABLET | Refills: 1 | Status: SHIPPED | OUTPATIENT
Start: 2025-08-15

## 2025-08-15 RX ORDER — ATORVASTATIN CALCIUM 40 MG/1
40 TABLET, FILM COATED ORAL DAILY
Qty: 90 TABLET | Refills: 1 | Status: SHIPPED | OUTPATIENT
Start: 2025-08-15

## 2025-08-25 ENCOUNTER — OFFICE VISIT (OUTPATIENT)
Age: 75
End: 2025-08-25
Payer: MEDICARE

## 2025-08-25 VITALS
OXYGEN SATURATION: 98 % | HEIGHT: 64 IN | DIASTOLIC BLOOD PRESSURE: 74 MMHG | HEART RATE: 79 BPM | SYSTOLIC BLOOD PRESSURE: 126 MMHG | WEIGHT: 141 LBS | BODY MASS INDEX: 24.07 KG/M2

## 2025-08-25 DIAGNOSIS — R41.3 MEMORY IMPAIRMENT: Primary | ICD-10-CM

## 2025-08-25 DIAGNOSIS — R26.9 IMPAIRED GAIT: ICD-10-CM

## 2025-08-25 DIAGNOSIS — R25.1 TREMOR: ICD-10-CM

## 2025-08-25 PROBLEM — Z86.73 HISTORY OF STROKE: Status: ACTIVE | Noted: 2025-08-25

## 2025-08-25 PROCEDURE — G8420 CALC BMI NORM PARAMETERS: HCPCS | Performed by: NURSE PRACTITIONER

## 2025-08-25 PROCEDURE — 1126F AMNT PAIN NOTED NONE PRSNT: CPT | Performed by: NURSE PRACTITIONER

## 2025-08-25 PROCEDURE — 1123F ACP DISCUSS/DSCN MKR DOCD: CPT | Performed by: NURSE PRACTITIONER

## 2025-08-25 PROCEDURE — G8399 PT W/DXA RESULTS DOCUMENT: HCPCS | Performed by: NURSE PRACTITIONER

## 2025-08-25 PROCEDURE — G8427 DOCREV CUR MEDS BY ELIG CLIN: HCPCS | Performed by: NURSE PRACTITIONER

## 2025-08-25 PROCEDURE — 3074F SYST BP LT 130 MM HG: CPT | Performed by: NURSE PRACTITIONER

## 2025-08-25 PROCEDURE — 99213 OFFICE O/P EST LOW 20 MIN: CPT | Performed by: NURSE PRACTITIONER

## 2025-08-25 PROCEDURE — 1159F MED LIST DOCD IN RCRD: CPT | Performed by: NURSE PRACTITIONER

## 2025-08-25 PROCEDURE — 1090F PRES/ABSN URINE INCON ASSESS: CPT | Performed by: NURSE PRACTITIONER

## 2025-08-25 PROCEDURE — 1036F TOBACCO NON-USER: CPT | Performed by: NURSE PRACTITIONER

## 2025-08-25 PROCEDURE — 3078F DIAST BP <80 MM HG: CPT | Performed by: NURSE PRACTITIONER

## 2025-08-25 ASSESSMENT — ENCOUNTER SYMPTOMS
SHORTNESS OF BREATH: 0
SORE THROAT: 0
ABDOMINAL PAIN: 0
WHEEZING: 0
NAUSEA: 0